# Patient Record
Sex: FEMALE | Race: WHITE | NOT HISPANIC OR LATINO | Employment: OTHER | URBAN - METROPOLITAN AREA
[De-identification: names, ages, dates, MRNs, and addresses within clinical notes are randomized per-mention and may not be internally consistent; named-entity substitution may affect disease eponyms.]

---

## 2017-02-23 DIAGNOSIS — Z74.09 IMPAIRED MOBILITY: Primary | ICD-10-CM

## 2017-03-08 ENCOUNTER — TELEPHONE (OUTPATIENT)
Dept: FAMILY MEDICINE | Facility: CLINIC | Age: 82
End: 2017-03-08

## 2017-03-08 DIAGNOSIS — Z12.31 SCREENING MAMMOGRAM FOR HIGH-RISK PATIENT: Primary | ICD-10-CM

## 2017-03-08 NOTE — TELEPHONE ENCOUNTER
----- Message from [place] sent at 3/8/2017  8:10 AM CST -----  Contact: self  Katarina Rivera  MRN: [id number]  : 1934  PCP: Ethan Denton  Home Phone      [phone]  Work Phone      Not on file.  Mobile          Not on file.      MESSAGE: call to set up mammogram, pt will be out of house today but back home tomorrow morning. Please call her in the morning.    Phone: [phone]

## 2017-03-14 ENCOUNTER — HOSPITAL ENCOUNTER (OUTPATIENT)
Dept: RADIOLOGY | Facility: HOSPITAL | Age: 82
Discharge: HOME | End: 2017-03-14
Attending: FAMILY MEDICINE
Payer: MEDICARE

## 2017-03-14 DIAGNOSIS — Z12.31 SCREENING MAMMOGRAM FOR HIGH-RISK PATIENT: ICD-10-CM

## 2017-03-14 PROCEDURE — 77067 SCR MAMMO BI INCL CAD: CPT | Mod: 26,,, | Performed by: RADIOLOGY

## 2017-03-14 PROCEDURE — 77063 BREAST TOMOSYNTHESIS BI: CPT | Mod: 26,,, | Performed by: RADIOLOGY

## 2017-03-14 PROCEDURE — 77067 SCR MAMMO BI INCL CAD: CPT | Mod: TC

## 2017-07-24 ENCOUNTER — OFFICE VISIT (OUTPATIENT)
Dept: NEUROLOGY | Facility: CLINIC | Age: 82
End: 2017-07-24
Payer: MEDICARE

## 2017-07-24 VITALS
SYSTOLIC BLOOD PRESSURE: 116 MMHG | HEART RATE: 66 BPM | DIASTOLIC BLOOD PRESSURE: 70 MMHG | RESPIRATION RATE: 16 BRPM | BODY MASS INDEX: 22.54 KG/M2 | WEIGHT: 127.19 LBS | HEIGHT: 63 IN

## 2017-07-24 DIAGNOSIS — G25.0 BENIGN ESSENTIAL TREMOR: Primary | ICD-10-CM

## 2017-07-24 PROCEDURE — 99213 OFFICE O/P EST LOW 20 MIN: CPT | Mod: PBBFAC | Performed by: PSYCHIATRY & NEUROLOGY

## 2017-07-24 PROCEDURE — 99213 OFFICE O/P EST LOW 20 MIN: CPT | Mod: S$PBB | Performed by: PSYCHIATRY & NEUROLOGY

## 2017-07-24 PROCEDURE — 99999 PR PBB SHADOW E&M-EST. PATIENT-LVL III: CPT | Mod: PBBFAC,,, | Performed by: PSYCHIATRY & NEUROLOGY

## 2017-07-24 RX ORDER — ATORVASTATIN CALCIUM 20 MG/1
20 TABLET, FILM COATED ORAL DAILY
COMMUNITY

## 2017-07-24 RX ORDER — METOPROLOL TARTRATE 100 MG/1
100 TABLET ORAL 2 TIMES DAILY
Status: ON HOLD | COMMUNITY
End: 2019-01-28 | Stop reason: SDUPTHER

## 2017-07-24 NOTE — LETTER
July 24, 2017      Matheus Alex MD  [address] [place] [address]           [facility]  [address] [facility] [address]  Phone: [phone]  Fax: [phone]          Patient: Katarina Rivera   MR Number: [id number]   YOB: 1934   Date of Visit: 7/24/2017       Dear Dr. Matheus Alex:    Thank you for referring Katarina Rivera to me for evaluation. Attached you will find relevant portions of my assessment and plan of care.    If you have questions, please do not hesitate to call me. I look forward to following Katarina Rivera along with you.    Sincerely,    Jr Poole MD    Enclosure  CC:  No Recipients    If you would like to receive this communication electronically, please contact [email or web address] or [phone] to request more information on [employer] access.    For providers and/or their staff who would like to refer a patient to [facility], please contact us through our one-stop-shop provider referral line, [facility], at [phone].    If you feel you have received this communication in error or would no longer like to receive these types of communications, please e-mail [email or web address]

## 2017-07-24 NOTE — PROGRESS NOTES
Consult from Dr Alex    HPI: Katarina Rivera is a 83 y.o. female with Tremor: She complains of tremor. There is a strong family history of tremor in all of her sisters and her mother. Tremor primarily involves the head.  Onset of symptoms was at least 30 years ago. Symptoms are currently of intermittent severity- sometimes mild or sometimes not often. She also has tremor of voice but states she damaged her vocal cords many years with screaming. Tremor exacerbated by hunger. Tremor is alleviated by sleep. She does not have any neck pain or involuntary spasms of the neck.   She denies drooling during sleep (wet pillows), rigidity, postural changes and difficulty with walking.  Her handwriting is normal.  She feels a bit dizzy in the am but steadies herself well- She was debilitated last year due to hospital stay which was related to her general medical problems. Was d/c'ed from NH last year.   She has no syncope  She is on NOAC for stroke prevention given her afib  She is on metoprolol   Patient lives alone but is here with family today. She was debilitated last year due to hospital stay which was related to her general medical problems. Was d/c'ed from NH last year.     Review of Systems   Constitutional: Negative for fever.   Eyes: Negative for double vision.   Respiratory: Negative for hemoptysis.    Cardiovascular: Negative for leg swelling.   Gastrointestinal: Negative for blood in stool.   Genitourinary: Negative for hematuria.   Musculoskeletal: Negative for falls and neck pain.   Skin: Negative for rash.   Neurological: Positive for tremors. Negative for headaches.   Psychiatric/Behavioral: Negative for memory loss.         I have reviewed all of this patient's past medical and surgical histories as well as family and social histories and active allergies and medications as documented in the electronic medical record.      Exam:  Gen Appearance, well developed/nourished in no apparent distress  CV: 2+ distal  pulses with no edema or swelling  Neuro:  MS: Awake, alert, oriented to place, person, time, situation. Sustains attention. Recent/remote memory intact, Language is full to spontaneous speech/repetition/naming/comprehension. Fund of Knowledge is full  She has a slight tremor of voice.   CN: Optic discs are flat with normal vasculature, PERRL, Extraoccular movements and visual fields are full. Normal facial sensation and strength, Hearing symmetric, Tongue and Palate are midline and strong. Shoulder Shrug symmetric and strong. There is a slight head tilt to the left and mild palpable spasms in the C spine  With the head tilt, it looks like she has cosmetic appearing sag of the left face which she states is chronic for years. There is no NLF weakness on strength testing- she will notify me if any changes in this/ if worse or new symptoms, but insists this is chronic  Motor: Normal bulk, tone, no abnormal movements of the hands, there is a no-no tremor of the head. 5/5 strength bilateral upper/lower extremities with 2+ reflexes and no clonus. NO protonotor  Sensory: symmetric to light touch, pain, temp, and vibration/proprioception. Romberg negative  Cerebellar: Finger-nose,Heal-shin, Rapid alternating movements intact  Gait: Normal stance, no ataxia      Assessment/Plan: Katarina Rivera is a 83 y.o. 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 The patient is not bothered by this long standing tremor and does not desire treatment  2. She is on metoprolol for rate and this may allow her some benefit from tremor  3. Botox would be another option for head tremor if desired. Note: she is on NOAC for stroke prevention given her afib.  Patient will see me PRN / as desired for tremor treatment.   CC: Dr Alex

## 2017-08-28 ENCOUNTER — PATIENT MESSAGE (OUTPATIENT)
Dept: FAMILY MEDICINE | Facility: CLINIC | Age: 82
End: 2017-08-28

## 2017-08-28 RX ORDER — SULFASALAZINE 500 MG/1
500 TABLET ORAL 2 TIMES DAILY
Qty: 60 TABLET | Refills: 0 | Status: SHIPPED | OUTPATIENT
Start: 2017-08-28 | End: 2017-10-26 | Stop reason: SDUPTHER

## 2017-10-26 ENCOUNTER — OFFICE VISIT (OUTPATIENT)
Dept: FAMILY MEDICINE | Facility: CLINIC | Age: 82
End: 2017-10-26
Payer: MEDICARE

## 2017-10-26 VITALS
BODY MASS INDEX: 24.14 KG/M2 | WEIGHT: 136.25 LBS | HEIGHT: 63 IN | SYSTOLIC BLOOD PRESSURE: 114 MMHG | DIASTOLIC BLOOD PRESSURE: 68 MMHG | HEART RATE: 66 BPM | RESPIRATION RATE: 16 BRPM

## 2017-10-26 DIAGNOSIS — M15.9 PRIMARY OSTEOARTHRITIS INVOLVING MULTIPLE JOINTS: ICD-10-CM

## 2017-10-26 DIAGNOSIS — I10 ESSENTIAL HYPERTENSION: ICD-10-CM

## 2017-10-26 DIAGNOSIS — E55.9 VITAMIN D INSUFFICIENCY: ICD-10-CM

## 2017-10-26 DIAGNOSIS — K51.90 CHRONIC ULCERATIVE COLITIS WITHOUT COMPLICATION, UNSPECIFIED LOCATION: Primary | ICD-10-CM

## 2017-10-26 DIAGNOSIS — R25.1 TREMOR: ICD-10-CM

## 2017-10-26 DIAGNOSIS — Z23 NEED FOR IMMUNIZATION AGAINST INFLUENZA: ICD-10-CM

## 2017-10-26 LAB
25(OH)D3+25(OH)D2 SERPL-MCNC: 25 NG/ML
ALBUMIN SERPL BCP-MCNC: 3.6 G/DL
ALP SERPL-CCNC: 119 U/L
ALT SERPL W/O P-5'-P-CCNC: 15 U/L
ANION GAP SERPL CALC-SCNC: 9 MMOL/L
AST SERPL-CCNC: 22 U/L
BASOPHILS # BLD AUTO: 0.07 K/UL
BASOPHILS NFR BLD: 0.5 %
BILIRUB SERPL-MCNC: 0.2 MG/DL
BUN SERPL-MCNC: 25 MG/DL
CALCIUM SERPL-MCNC: 9.6 MG/DL
CHLORIDE SERPL-SCNC: 108 MMOL/L
CO2 SERPL-SCNC: 27 MMOL/L
CREAT SERPL-MCNC: 1 MG/DL
DIFFERENTIAL METHOD: ABNORMAL
EOSINOPHIL # BLD AUTO: 0.8 K/UL
EOSINOPHIL NFR BLD: 5.5 %
ERYTHROCYTE [DISTWIDTH] IN BLOOD BY AUTOMATED COUNT: 15.4 %
ERYTHROCYTE [SEDIMENTATION RATE] IN BLOOD BY WESTERGREN METHOD: 9 MM/HR
EST. GFR  (AFRICAN AMERICAN): >60 ML/MIN/1.73 M^2
EST. GFR  (NON AFRICAN AMERICAN): 52 ML/MIN/1.73 M^2
GLUCOSE SERPL-MCNC: 85 MG/DL
HCT VFR BLD AUTO: 36.9 %
HGB BLD-MCNC: 11.2 G/DL
LYMPHOCYTES # BLD AUTO: 3.6 K/UL
LYMPHOCYTES NFR BLD: 26.5 %
MCH RBC QN AUTO: 26.6 PG
MCHC RBC AUTO-ENTMCNC: 30.4 G/DL
MCV RBC AUTO: 88 FL
MONOCYTES # BLD AUTO: 1.9 K/UL
MONOCYTES NFR BLD: 13.9 %
NEUTROPHILS # BLD AUTO: 7.3 K/UL
NEUTROPHILS NFR BLD: 53.6 %
PLATELET # BLD AUTO: 372 K/UL
PMV BLD AUTO: 11.6 FL
POTASSIUM SERPL-SCNC: 4.3 MMOL/L
PROT SERPL-MCNC: 7.4 G/DL
RBC # BLD AUTO: 4.21 M/UL
SODIUM SERPL-SCNC: 144 MMOL/L
WBC # BLD AUTO: 13.55 K/UL

## 2017-10-26 PROCEDURE — 85025 COMPLETE CBC W/AUTO DIFF WBC: CPT

## 2017-10-26 PROCEDURE — 99999 PR PBB SHADOW E&M-EST. PATIENT-LVL III: CPT | Mod: PBBFAC,,, | Performed by: FAMILY MEDICINE

## 2017-10-26 PROCEDURE — 85651 RBC SED RATE NONAUTOMATED: CPT

## 2017-10-26 PROCEDURE — 99214 OFFICE O/P EST MOD 30 MIN: CPT | Mod: S$PBB | Performed by: FAMILY MEDICINE

## 2017-10-26 PROCEDURE — 82306 VITAMIN D 25 HYDROXY: CPT

## 2017-10-26 PROCEDURE — G0008 ADMIN INFLUENZA VIRUS VAC: HCPCS | Mod: PBBFAC

## 2017-10-26 PROCEDURE — 36415 COLL VENOUS BLD VENIPUNCTURE: CPT | Mod: PBBFAC

## 2017-10-26 PROCEDURE — 80053 COMPREHEN METABOLIC PANEL: CPT

## 2017-10-26 PROCEDURE — 99213 OFFICE O/P EST LOW 20 MIN: CPT | Mod: PBBFAC | Performed by: FAMILY MEDICINE

## 2017-10-26 PROCEDURE — 99999 PR STA SHADOW: CPT | Mod: PBBFAC,,, | Performed by: FAMILY MEDICINE

## 2017-10-26 PROCEDURE — 99999 FLU VACCINE - HIGH DOSE (65+) PRESERVATIVE FREE IM: CPT | Mod: PBBFAC,,,

## 2017-10-26 RX ORDER — SULFASALAZINE 500 MG/1
500 TABLET ORAL 2 TIMES DAILY
Qty: 60 TABLET | Refills: 5 | Status: SHIPPED | OUTPATIENT
Start: 2017-10-26 | End: 2019-01-29 | Stop reason: SDUPTHER

## 2017-10-26 RX ORDER — FUROSEMIDE 40 MG/1
40 TABLET ORAL DAILY
Qty: 30 TABLET | Refills: 11 | Status: SHIPPED | OUTPATIENT
Start: 2017-10-26 | End: 2019-01-29 | Stop reason: SDUPTHER

## 2017-10-26 NOTE — PROGRESS NOTES
Subjective:       Patient ID: Katarina Rivera is a 83 y.o. female.    Chief Complaint: Medication Refill    Pt is a 83 y.o. female who presents for check up for Chronic ulcerative colitis without complication, unspecified location  (primary encounter diagnosis)  Tremor  Primary osteoarthritis involving multiple joints. Doing well on current meds. Denies any side effects. Prevention is not up to date.      Medication Refill   Pertinent negatives include no abdominal pain, chest pain, chills, coughing, fever, headaches, joint swelling, nausea, numbness, rash, sore throat, vomiting or weakness.     Review of Systems   Constitutional: Negative for appetite change, chills and fever.   HENT: Negative for rhinorrhea, sinus pressure, sore throat and trouble swallowing.    Respiratory: Negative for cough, chest tightness, shortness of breath and wheezing.    Cardiovascular: Negative for chest pain and palpitations.   Gastrointestinal: Negative for abdominal pain, blood in stool, diarrhea, nausea and vomiting.   Genitourinary: Negative for dysuria, flank pain, hematuria, pelvic pain, urgency, vaginal bleeding, vaginal discharge and vaginal pain.   Musculoskeletal: Negative for back pain, joint swelling and neck stiffness.   Skin: Negative for rash.   Neurological: Positive for tremors. Negative for dizziness, weakness, light-headedness, numbness and headaches.   Hematological: Does not bruise/bleed easily.   Psychiatric/Behavioral: Negative for agitation. The patient is not nervous/anxious.        Objective:      Physical Exam   Constitutional: She is oriented to person, place, and time. She appears well-developed and well-nourished.   HENT:   Head: Normocephalic.   Speech is weak   Eyes: Pupils are equal, round, and reactive to light.   Neck: Normal range of motion. Neck supple. No thyromegaly present.   Cardiovascular: Normal rate and regular rhythm.    Pulmonary/Chest: No respiratory distress. She has no wheezes. She has no  rales. She exhibits no tenderness.   Abdominal: She exhibits no distension. There is no tenderness. There is no rebound and no guarding.   Musculoskeletal: Normal range of motion. She exhibits no edema or tenderness.   Lymphadenopathy:     She has no cervical adenopathy.   Neurological: She is alert and oriented to person, place, and time. She has normal reflexes. She displays normal reflexes. No cranial nerve deficit. She exhibits normal muscle tone. Coordination normal.   Tremors of her head   Skin: Skin is warm and dry. No rash noted. No pallor.   Psychiatric: She has a normal mood and affect. Judgment and thought content normal.       Assessment:       1. Chronic ulcerative colitis without complication, unspecified location    2. Tremor    3. Primary osteoarthritis involving multiple joints    4. Essential hypertension    5. Vitamin D insufficiency    6. Need for immunization against influenza        Plan:   Katarina was seen today for medication refill.    Diagnoses and all orders for this visit:    Chronic ulcerative colitis without complication, unspecified location  -     Comprehensive metabolic panel; Future  -     CBC auto differential; Future  -     Sedimentation rate, manual; Future    Tremor    Primary osteoarthritis involving multiple joints    Essential hypertension  -     furosemide (LASIX) 40 MG tablet; Take 1 tablet (40 mg total) by mouth once daily.    Vitamin D insufficiency  -     Vitamin D; Future    Need for immunization against influenza  -     Influenza - High Dose (65+) (PF) (IM)    Other orders  -     sulfaSALAzine (AZULFIDINE) 500 mg Tab; Take 1 tablet (500 mg total) by mouth 2 (two) times daily.

## 2017-11-16 ENCOUNTER — HOSPITAL ENCOUNTER (EMERGENCY)
Facility: HOSPITAL | Age: 82
Discharge: HOME | End: 2017-11-16
Attending: FAMILY MEDICINE
Payer: MEDICARE

## 2017-11-16 VITALS
DIASTOLIC BLOOD PRESSURE: 85 MMHG | OXYGEN SATURATION: 97 % | SYSTOLIC BLOOD PRESSURE: 187 MMHG | WEIGHT: 130 LBS | BODY MASS INDEX: 23.03 KG/M2 | RESPIRATION RATE: 16 BRPM | TEMPERATURE: 97 F | HEART RATE: 59 BPM

## 2017-11-16 DIAGNOSIS — K08.89 TOOTHACHE: Primary | ICD-10-CM

## 2017-11-16 PROCEDURE — 25000003 PHARM REV CODE 250: Performed by: FAMILY MEDICINE

## 2017-11-16 PROCEDURE — 99283 EMERGENCY DEPT VISIT LOW MDM: CPT

## 2017-11-16 RX ORDER — KETOROLAC TROMETHAMINE 10 MG/1
10 TABLET, FILM COATED ORAL
Status: COMPLETED | OUTPATIENT
Start: 2017-11-16 | End: 2017-11-16

## 2017-11-16 RX ORDER — PENICILLIN V POTASSIUM 500 MG/1
500 TABLET, FILM COATED ORAL 4 TIMES DAILY
Qty: 40 TABLET | Refills: 0 | Status: SHIPPED | OUTPATIENT
Start: 2017-11-16 | End: 2017-11-23

## 2017-11-16 RX ORDER — PENICILLIN V POTASSIUM 250 MG/1
500 TABLET, FILM COATED ORAL
Status: COMPLETED | OUTPATIENT
Start: 2017-11-16 | End: 2017-11-16

## 2017-11-16 RX ORDER — KETOROLAC TROMETHAMINE 10 MG/1
10 TABLET, FILM COATED ORAL 4 TIMES DAILY PRN
Qty: 10 TABLET | Refills: 0 | Status: SHIPPED | OUTPATIENT
Start: 2017-11-16 | End: 2019-01-25

## 2017-11-16 RX ADMIN — KETOROLAC TROMETHAMINE 10 MG: 10 TABLET, FILM COATED ORAL at 03:11

## 2017-11-16 RX ADMIN — PENICILLIN V POTASSIUM 500 MG: 250 TABLET, FILM COATED ORAL at 03:11

## 2017-11-16 NOTE — ED TRIAGE NOTES
83 y.o. female presents to ER ED 04/ED 04   Chief Complaint   Patient presents with    Dental Pain     right side for 2 days   . Reports taking several extra-strength Tylenol with no relief.

## 2017-11-16 NOTE — ED PROVIDER NOTES
Encounter Date: 11/16/2017       History     Chief Complaint   Patient presents with    Dental Pain     right side for 2 days     The history is provided by the patient. No  was used.   Dental Pain   The primary symptoms include mouth pain. Primary symptoms do not include dental injury, oral bleeding, oral lesions, headaches, fever, sore throat or angioedema. The symptoms began two days ago. The symptoms are unchanged. The symptoms are new. The symptoms occur constantly.   Mouth pain began 24 -48 hours ago. Mouth pain occurs constantly. Affected locations include: teeth. At its highest the mouth pain was at 5/10. The mouth pain is currently at 5/10.   Additional symptoms include: dental sensitivity to temperature and jaw pain. Additional symptoms do not include: gum swelling, gum tenderness, purulent gums, trismus, facial swelling, trouble swallowing, pain with swallowing, excessive salivation, dry mouth, taste disturbance, smell disturbance, drooling, ear pain, hearing loss, nosebleeds, swollen glands, goiter and fatigue. Medical issues do not include: smoking.     Patient reports onset of pain on the right side of her mouth starting 2 days ago.  Pain is worse with doing any activity hot cold.  She took Tylenol.  No fever chills nausea or vomiting.  She does not smoke cigarettes.  She thinks she may have broken a tooth.    Review of patient's allergies indicates:  No Known Allergies  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      Past Surgical History:   Procedure Laterality Date    APPENDECTOMY      COLONOSCOPY      COLONOSCOPY N/A 11/10/2015    Procedure: COLONOSCOPY;  Surgeon: Michael San MD;  Location: [address];  Service: Endoscopy;  Laterality: N/A;    hemorrhoidectomy      HYSTERECTOMY      TOTAL KNEE ARTHROPLASTY      left      Family History   Problem Relation Age of  Onset    Stroke Mother     Blindness Father     Colon cancer Neg Hx     Ulcerative colitis Neg Hx      Social History   Substance Use Topics    Smoking status: Never Smoker    Smokeless tobacco: Never Used    Alcohol use No     Review of Systems   Constitutional: Negative for fatigue and fever.   HENT: Negative for drooling, ear pain, facial swelling, hearing loss, nosebleeds, sore throat and trouble swallowing.    Neurological: Negative for headaches.       Physical Exam     Initial Vitals [11/16/17 0342]   BP Pulse Resp Temp SpO2   (!) 187/85 (!) 59 16 96.7 °F (35.9 °C) 97 %      MAP       119         Physical Exam    Nursing note and vitals reviewed.  Constitutional:   Elderly female with persistent head tremor.   HENT:   Head: Normocephalic and atraumatic.       Right Ear: External ear normal.   Left Ear: External ear normal.   Nose: Nose normal.   Mouth/Throat: Oropharynx is clear and moist. She does not have dentures. No oral lesions. No trismus in the jaw. Abnormal dentition. Dental caries present. No dental abscesses, uvula swelling or lacerations.   Eyes: Conjunctivae and EOM are normal. Pupils are equal, round, and reactive to light.   Neck: Normal range of motion.   Cardiovascular: Normal rate, regular rhythm and normal heart sounds.   Pulmonary/Chest: Breath sounds normal.   Psychiatric: She has a normal mood and affect.         ED Course   Procedures  Labs Reviewed - No data to display                            ED Course      Clinical Impression:   The encounter diagnosis was Toothache.                           Bucky Nicole MD  11/16/17 0355

## 2018-03-22 ENCOUNTER — HOSPITAL ENCOUNTER (OUTPATIENT)
Dept: RADIOLOGY | Facility: HOSPITAL | Age: 83
Discharge: HOME | End: 2018-03-22
Attending: FAMILY MEDICINE
Payer: MEDICARE

## 2018-03-22 VITALS — BODY MASS INDEX: 23.04 KG/M2 | WEIGHT: 130 LBS | HEIGHT: 63 IN

## 2018-03-22 DIAGNOSIS — Z12.31 ENCOUNTER FOR SCREENING MAMMOGRAM FOR BREAST CANCER: ICD-10-CM

## 2018-03-22 PROCEDURE — 77063 BREAST TOMOSYNTHESIS BI: CPT | Mod: 26,,, | Performed by: RADIOLOGY

## 2018-03-22 PROCEDURE — 77067 SCR MAMMO BI INCL CAD: CPT | Mod: 26,,, | Performed by: RADIOLOGY

## 2018-03-22 PROCEDURE — 77067 SCR MAMMO BI INCL CAD: CPT | Mod: TC

## 2019-01-25 ENCOUNTER — HOSPITAL ENCOUNTER (INPATIENT)
Facility: HOSPITAL | Age: 84
LOS: 3 days | Discharge: HOME | DRG: 176 | End: 2019-01-28
Attending: FAMILY MEDICINE | Admitting: FAMILY MEDICINE
Payer: MEDICARE

## 2019-01-25 ENCOUNTER — HOSPITAL ENCOUNTER (EMERGENCY)
Facility: HOSPITAL | Age: 84
Discharge: ANOTHER HOSPITAL | End: 2019-01-25
Attending: SURGERY
Payer: MEDICARE

## 2019-01-25 ENCOUNTER — TELEPHONE (OUTPATIENT)
Dept: CARDIOLOGY | Facility: CLINIC | Age: 84
End: 2019-01-25

## 2019-01-25 VITALS
BODY MASS INDEX: 24.8 KG/M2 | DIASTOLIC BLOOD PRESSURE: 70 MMHG | OXYGEN SATURATION: 92 % | HEIGHT: 63 IN | RESPIRATION RATE: 22 BRPM | SYSTOLIC BLOOD PRESSURE: 130 MMHG | HEART RATE: 108 BPM | WEIGHT: 140 LBS | TEMPERATURE: 98 F

## 2019-01-25 DIAGNOSIS — R06.00 DYSPNEA: Primary | ICD-10-CM

## 2019-01-25 DIAGNOSIS — R07.9 CHEST PAIN: ICD-10-CM

## 2019-01-25 DIAGNOSIS — I21.4 NSTEMI (NON-ST ELEVATED MYOCARDIAL INFARCTION): ICD-10-CM

## 2019-01-25 DIAGNOSIS — I26.99 BILATERAL PULMONARY EMBOLISM: Primary | ICD-10-CM

## 2019-01-25 DIAGNOSIS — I51.89 DIASTOLIC DYSFUNCTION: ICD-10-CM

## 2019-01-25 DIAGNOSIS — D64.9 SYMPTOMATIC ANEMIA: ICD-10-CM

## 2019-01-25 DIAGNOSIS — Z87.19 HISTORY OF ULCERATIVE COLITIS: ICD-10-CM

## 2019-01-25 DIAGNOSIS — I50.9 HEART FAILURE: ICD-10-CM

## 2019-01-25 DIAGNOSIS — K51.919 ULCERATIVE COLITIS WITH COMPLICATION, UNSPECIFIED LOCATION: ICD-10-CM

## 2019-01-25 LAB
ABO + RH BLD: NORMAL
ACANTHOCYTES BLD QL SMEAR: PRESENT
ALBUMIN SERPL BCP-MCNC: 3.5 G/DL
ALLENS TEST: ABNORMAL
ALP SERPL-CCNC: 106 U/L
ALT SERPL W/O P-5'-P-CCNC: 8 U/L
ANION GAP SERPL CALC-SCNC: 11 MMOL/L
ANISOCYTOSIS BLD QL SMEAR: ABNORMAL
ANISOCYTOSIS BLD QL SMEAR: ABNORMAL
ANISOCYTOSIS BLD QL SMEAR: SLIGHT
APTT BLDCRRT: 28 SEC
APTT BLDCRRT: 29.5 SEC
APTT BLDCRRT: 30 SEC
APTT BLDCRRT: 52.4 SEC
AST SERPL-CCNC: 10 U/L
BASOPHILS # BLD AUTO: 0.04 K/UL
BASOPHILS # BLD AUTO: ABNORMAL K/UL
BASOPHILS # BLD AUTO: ABNORMAL K/UL
BASOPHILS NFR BLD: 0 %
BASOPHILS NFR BLD: 0.3 %
BASOPHILS NFR BLD: 1 %
BILIRUB SERPL-MCNC: 0.6 MG/DL
BLD GP AB SCN CELLS X3 SERPL QL: NORMAL
BLD PROD TYP BPU: NORMAL
BLD PROD TYP BPU: NORMAL
BLOOD UNIT EXPIRATION DATE: NORMAL
BLOOD UNIT EXPIRATION DATE: NORMAL
BLOOD UNIT TYPE CODE: 5100
BLOOD UNIT TYPE CODE: 5100
BLOOD UNIT TYPE: NORMAL
BLOOD UNIT TYPE: NORMAL
BNP SERPL-MCNC: 515 PG/ML
BUN SERPL-MCNC: 20 MG/DL
CALCIUM SERPL-MCNC: 9.1 MG/DL
CHLORIDE SERPL-SCNC: 109 MMOL/L
CK MB SERPL-MCNC: 2.2 NG/ML
CK MB SERPL-RTO: 5.6 %
CK SERPL-CCNC: 39 U/L
CK SERPL-CCNC: 39 U/L
CO2 SERPL-SCNC: 21 MMOL/L
CODING SYSTEM: NORMAL
CODING SYSTEM: NORMAL
CREAT SERPL-MCNC: 1.1 MG/DL
DELSYS: ABNORMAL
DIFFERENTIAL METHOD: ABNORMAL
DISPENSE STATUS: NORMAL
DISPENSE STATUS: NORMAL
EOSINOPHIL # BLD AUTO: 0 K/UL
EOSINOPHIL # BLD AUTO: ABNORMAL K/UL
EOSINOPHIL # BLD AUTO: ABNORMAL K/UL
EOSINOPHIL NFR BLD: 0 %
EOSINOPHIL NFR BLD: 0 %
EOSINOPHIL NFR BLD: 0.2 %
ERYTHROCYTE [DISTWIDTH] IN BLOOD BY AUTOMATED COUNT: 19.2 %
ERYTHROCYTE [DISTWIDTH] IN BLOOD BY AUTOMATED COUNT: 27.5 %
ERYTHROCYTE [DISTWIDTH] IN BLOOD BY AUTOMATED COUNT: 28 %
EST. GFR  (AFRICAN AMERICAN): 53 ML/MIN/1.73 M^2
EST. GFR  (NON AFRICAN AMERICAN): 46 ML/MIN/1.73 M^2
FERRITIN SERPL-MCNC: 13 NG/ML
FOLATE SERPL-MCNC: 3.8 NG/ML
GLUCOSE SERPL-MCNC: 162 MG/DL
HCO3 UR-SCNC: 19.6 MMOL/L (ref 22–26)
HCT VFR BLD AUTO: 27.7 %
HCT VFR BLD AUTO: 33.7 %
HCT VFR BLD AUTO: 34.9 %
HGB BLD-MCNC: 10.3 G/DL
HGB BLD-MCNC: 10.4 G/DL
HGB BLD-MCNC: 7.9 G/DL
HYPOCHROMIA BLD QL SMEAR: ABNORMAL
INR PPP: 1
IRON SERPL-MCNC: <10 UG/DL
LYMPHOCYTES # BLD AUTO: 1.4 K/UL
LYMPHOCYTES # BLD AUTO: ABNORMAL K/UL
LYMPHOCYTES # BLD AUTO: ABNORMAL K/UL
LYMPHOCYTES NFR BLD: 1 %
LYMPHOCYTES NFR BLD: 6 %
LYMPHOCYTES NFR BLD: 9.2 %
MCH RBC QN AUTO: 18.2 PG
MCH RBC QN AUTO: 21.4 PG
MCH RBC QN AUTO: 22 PG
MCHC RBC AUTO-ENTMCNC: 28.5 G/DL
MCHC RBC AUTO-ENTMCNC: 29.8 G/DL
MCHC RBC AUTO-ENTMCNC: 30.6 G/DL
MCV RBC AUTO: 64 FL
MCV RBC AUTO: 72 FL
MCV RBC AUTO: 72 FL
MONOCYTES # BLD AUTO: 1.5 K/UL
MONOCYTES # BLD AUTO: ABNORMAL K/UL
MONOCYTES # BLD AUTO: ABNORMAL K/UL
MONOCYTES NFR BLD: 2 %
MONOCYTES NFR BLD: 7 %
MONOCYTES NFR BLD: 9.6 %
NEUTROPHILS # BLD AUTO: 12.4 K/UL
NEUTROPHILS NFR BLD: 80.7 %
NEUTROPHILS NFR BLD: 82 %
NEUTROPHILS NFR BLD: 96 %
NEUTS BAND NFR BLD MANUAL: 5 %
OVALOCYTES BLD QL SMEAR: ABNORMAL
OVALOCYTES BLD QL SMEAR: ABNORMAL
PCO2 BLDA: 31 MMHG (ref 35–45)
PH SMN: 7.41 [PH] (ref 7.35–7.45)
PLATELET # BLD AUTO: 341 K/UL
PLATELET # BLD AUTO: 375 K/UL
PLATELET # BLD AUTO: 412 K/UL
PLATELET BLD QL SMEAR: ABNORMAL
PLATELET BLD QL SMEAR: ABNORMAL
PMV BLD AUTO: 10.2 FL
PMV BLD AUTO: 10.3 FL
PMV BLD AUTO: 9.7 FL
PO2 BLDA: 54 MMHG (ref 75–100)
POC BE: -4.5 MMOL/L (ref -2–2)
POC COHB: 2.7 % (ref 0–3)
POC METHB: 1.7 % (ref 0–1.5)
POC O2HB ARTERIAL: 86.6 % (ref 94–100)
POC SATURATED O2: 90.5 % (ref 90–100)
POC TCO2: 20.6 MMOL/L
POC THB: 7.3 G/DL (ref 12–18)
POIKILOCYTOSIS BLD QL SMEAR: SLIGHT
POLYCHROMASIA BLD QL SMEAR: ABNORMAL
POTASSIUM SERPL-SCNC: 3.9 MMOL/L
PROT SERPL-MCNC: 7.4 G/DL
PROTHROMBIN TIME: 10.6 SEC
PROTHROMBIN TIME: 10.9 SEC
PROTHROMBIN TIME: 11 SEC
RBC # BLD AUTO: 4.33 M/UL
RBC # BLD AUTO: 4.69 M/UL
RBC # BLD AUTO: 4.87 M/UL
SATURATED IRON: ABNORMAL %
SITE: ABNORMAL
SODIUM SERPL-SCNC: 141 MMOL/L
TOTAL IRON BINDING CAPACITY: 514 UG/DL
TRANS ERYTHROCYTES VOL PATIENT: NORMAL ML
TRANS ERYTHROCYTES VOL PATIENT: NORMAL ML
TRANSFERRIN SERPL-MCNC: 347 MG/DL
TROPONIN I SERPL DL<=0.01 NG/ML-MCNC: 0.17 NG/ML
TROPONIN I SERPL DL<=0.01 NG/ML-MCNC: 0.2 NG/ML
VIT B12 SERPL-MCNC: 452 PG/ML
WBC # BLD AUTO: 13.16 K/UL
WBC # BLD AUTO: 15.4 K/UL
WBC # BLD AUTO: 21.87 K/UL

## 2019-01-25 PROCEDURE — 96375 TX/PRO/DX INJ NEW DRUG ADDON: CPT | Mod: 59

## 2019-01-25 PROCEDURE — P9021 RED BLOOD CELLS UNIT: HCPCS

## 2019-01-25 PROCEDURE — C9113 INJ PANTOPRAZOLE SODIUM, VIA: HCPCS | Performed by: EMERGENCY MEDICINE

## 2019-01-25 PROCEDURE — 85730 THROMBOPLASTIN TIME PARTIAL: CPT | Mod: 91

## 2019-01-25 PROCEDURE — 25500020 PHARM REV CODE 255: Performed by: EMERGENCY MEDICINE

## 2019-01-25 PROCEDURE — 93010 EKG 12-LEAD: ICD-10-PCS | Mod: 59,,, | Performed by: INTERNAL MEDICINE

## 2019-01-25 PROCEDURE — 85610 PROTHROMBIN TIME: CPT

## 2019-01-25 PROCEDURE — 96361 HYDRATE IV INFUSION ADD-ON: CPT | Mod: 59

## 2019-01-25 PROCEDURE — 94761 N-INVAS EAR/PLS OXIMETRY MLT: CPT

## 2019-01-25 PROCEDURE — 82607 VITAMIN B-12: CPT

## 2019-01-25 PROCEDURE — 82746 ASSAY OF FOLIC ACID SERUM: CPT

## 2019-01-25 PROCEDURE — 85610 PROTHROMBIN TIME: CPT | Mod: 91

## 2019-01-25 PROCEDURE — 20000000 HC ICU ROOM

## 2019-01-25 PROCEDURE — 36415 COLL VENOUS BLD VENIPUNCTURE: CPT

## 2019-01-25 PROCEDURE — 86920 COMPATIBILITY TEST SPIN: CPT | Mod: 59

## 2019-01-25 PROCEDURE — 82803 BLOOD GASES ANY COMBINATION: CPT | Performed by: EMERGENCY MEDICINE

## 2019-01-25 PROCEDURE — 82728 ASSAY OF FERRITIN: CPT

## 2019-01-25 PROCEDURE — 25000003 PHARM REV CODE 250: Performed by: EMERGENCY MEDICINE

## 2019-01-25 PROCEDURE — 85025 COMPLETE CBC W/AUTO DIFF WBC: CPT

## 2019-01-25 PROCEDURE — 36600 WITHDRAWAL OF ARTERIAL BLOOD: CPT

## 2019-01-25 PROCEDURE — 85027 COMPLETE CBC AUTOMATED: CPT

## 2019-01-25 PROCEDURE — 93005 ELECTROCARDIOGRAM TRACING: CPT

## 2019-01-25 PROCEDURE — 84484 ASSAY OF TROPONIN QUANT: CPT | Mod: 91

## 2019-01-25 PROCEDURE — 63600175 PHARM REV CODE 636 W HCPCS: Performed by: EMERGENCY MEDICINE

## 2019-01-25 PROCEDURE — 85730 THROMBOPLASTIN TIME PARTIAL: CPT

## 2019-01-25 PROCEDURE — 96376 TX/PRO/DX INJ SAME DRUG ADON: CPT

## 2019-01-25 PROCEDURE — 36430 TRANSFUSION BLD/BLD COMPNT: CPT

## 2019-01-25 PROCEDURE — 80053 COMPREHEN METABOLIC PANEL: CPT

## 2019-01-25 PROCEDURE — 99285 EMERGENCY DEPT VISIT HI MDM: CPT | Mod: 25

## 2019-01-25 PROCEDURE — 83880 ASSAY OF NATRIURETIC PEPTIDE: CPT

## 2019-01-25 PROCEDURE — 93010 ELECTROCARDIOGRAM REPORT: CPT | Mod: 59,,, | Performed by: INTERNAL MEDICINE

## 2019-01-25 PROCEDURE — 82550 ASSAY OF CK (CPK): CPT

## 2019-01-25 PROCEDURE — 27000221 HC OXYGEN, UP TO 24 HOURS

## 2019-01-25 PROCEDURE — 83540 ASSAY OF IRON: CPT

## 2019-01-25 PROCEDURE — 82553 CREATINE MB FRACTION: CPT

## 2019-01-25 PROCEDURE — 84484 ASSAY OF TROPONIN QUANT: CPT

## 2019-01-25 PROCEDURE — 86901 BLOOD TYPING SEROLOGIC RH(D): CPT

## 2019-01-25 PROCEDURE — 25000003 PHARM REV CODE 250: Performed by: STUDENT IN AN ORGANIZED HEALTH CARE EDUCATION/TRAINING PROGRAM

## 2019-01-25 PROCEDURE — 96374 THER/PROPH/DIAG INJ IV PUSH: CPT | Mod: 59

## 2019-01-25 PROCEDURE — 85007 BL SMEAR W/DIFF WBC COUNT: CPT

## 2019-01-25 RX ORDER — FUROSEMIDE 40 MG/1
40 TABLET ORAL DAILY
Status: DISCONTINUED | OUTPATIENT
Start: 2019-01-26 | End: 2019-01-28 | Stop reason: HOSPADM

## 2019-01-25 RX ORDER — PANTOPRAZOLE SODIUM 40 MG/10ML
80 INJECTION, POWDER, LYOPHILIZED, FOR SOLUTION INTRAVENOUS
Status: COMPLETED | OUTPATIENT
Start: 2019-01-25 | End: 2019-01-25

## 2019-01-25 RX ORDER — LATANOPROST 50 UG/ML
1 SOLUTION/ DROPS OPHTHALMIC NIGHTLY
Status: DISCONTINUED | OUTPATIENT
Start: 2019-01-25 | End: 2019-01-28 | Stop reason: HOSPADM

## 2019-01-25 RX ORDER — HEPARIN SODIUM,PORCINE/D5W 25000/250
18 INTRAVENOUS SOLUTION INTRAVENOUS CONTINUOUS
Status: DISCONTINUED | OUTPATIENT
Start: 2019-01-25 | End: 2019-01-25 | Stop reason: HOSPADM

## 2019-01-25 RX ORDER — ONDANSETRON 4 MG/1
4 TABLET, ORALLY DISINTEGRATING ORAL
Status: COMPLETED | OUTPATIENT
Start: 2019-01-25 | End: 2019-01-25

## 2019-01-25 RX ORDER — HEPARIN SODIUM,PORCINE/D5W 25000/250
18 INTRAVENOUS SOLUTION INTRAVENOUS CONTINUOUS
Status: DISCONTINUED | OUTPATIENT
Start: 2019-01-25 | End: 2019-01-28 | Stop reason: HOSPADM

## 2019-01-25 RX ORDER — HYDROCODONE BITARTRATE AND ACETAMINOPHEN 500; 5 MG/1; MG/1
TABLET ORAL
Status: DISCONTINUED | OUTPATIENT
Start: 2019-01-25 | End: 2019-01-25 | Stop reason: HOSPADM

## 2019-01-25 RX ORDER — SODIUM CHLORIDE 0.9 % (FLUSH) 0.9 %
3 SYRINGE (ML) INJECTION
Status: DISCONTINUED | OUTPATIENT
Start: 2019-01-25 | End: 2019-01-28 | Stop reason: HOSPADM

## 2019-01-25 RX ORDER — MORPHINE SULFATE 2 MG/ML
2 INJECTION, SOLUTION INTRAMUSCULAR; INTRAVENOUS
Status: COMPLETED | OUTPATIENT
Start: 2019-01-25 | End: 2019-01-25

## 2019-01-25 RX ORDER — METOPROLOL TARTRATE 50 MG/1
100 TABLET ORAL 2 TIMES DAILY
Status: DISCONTINUED | OUTPATIENT
Start: 2019-01-25 | End: 2019-01-28 | Stop reason: HOSPADM

## 2019-01-25 RX ORDER — HEPARIN SODIUM 5000 [USP'U]/ML
5000 INJECTION, SOLUTION INTRAVENOUS; SUBCUTANEOUS
Status: DISCONTINUED | OUTPATIENT
Start: 2019-01-25 | End: 2019-01-25

## 2019-01-25 RX ORDER — ASPIRIN 325 MG
325 TABLET, DELAYED RELEASE (ENTERIC COATED) ORAL
Status: DISCONTINUED | OUTPATIENT
Start: 2019-01-25 | End: 2019-01-25

## 2019-01-25 RX ORDER — ATORVASTATIN CALCIUM 20 MG/1
20 TABLET, FILM COATED ORAL DAILY
Status: DISCONTINUED | OUTPATIENT
Start: 2019-01-26 | End: 2019-01-28 | Stop reason: HOSPADM

## 2019-01-25 RX ORDER — RAMELTEON 8 MG/1
8 TABLET ORAL NIGHTLY PRN
Status: DISCONTINUED | OUTPATIENT
Start: 2019-01-25 | End: 2019-01-28 | Stop reason: HOSPADM

## 2019-01-25 RX ADMIN — SODIUM CHLORIDE 1000 ML: 0.9 INJECTION, SOLUTION INTRAVENOUS at 09:01

## 2019-01-25 RX ADMIN — PANTOPRAZOLE SODIUM 80 MG: 40 INJECTION, POWDER, FOR SOLUTION INTRAVENOUS at 11:01

## 2019-01-25 RX ADMIN — NITROGLYCERIN 0.5 INCH: 20 OINTMENT TOPICAL at 07:01

## 2019-01-25 RX ADMIN — SODIUM CHLORIDE 500 ML: 0.9 INJECTION, SOLUTION INTRAVENOUS at 06:01

## 2019-01-25 RX ADMIN — METOPROLOL TARTRATE 100 MG: 50 TABLET ORAL at 10:01

## 2019-01-25 RX ADMIN — MORPHINE SULFATE 2 MG: 2 INJECTION, SOLUTION INTRAMUSCULAR; INTRAVENOUS at 08:01

## 2019-01-25 RX ADMIN — IOHEXOL 100 ML: 350 INJECTION, SOLUTION INTRAVENOUS at 10:01

## 2019-01-25 RX ADMIN — HEPARIN SODIUM 18 UNITS/KG/HR: 10000 INJECTION, SOLUTION INTRAVENOUS at 12:01

## 2019-01-25 RX ADMIN — LATANOPROST 1 DROP: 50 SOLUTION OPHTHALMIC at 10:01

## 2019-01-25 RX ADMIN — ONDANSETRON 4 MG: 4 TABLET, ORALLY DISINTEGRATING ORAL at 08:01

## 2019-01-25 NOTE — TELEPHONE ENCOUNTER
----- Message from Jaelyn Riley sent at 1/25/2019 12:48 PM CST -----  Yakelin House Supervisor for [facility], called to speak to Dr. Sinclair.   No. [phone]

## 2019-01-25 NOTE — ED NOTES
Spoke to Vandana with transfer center. Pt accepted at [facility] Room 254. Transfer center to set up transport via [facility]

## 2019-01-25 NOTE — ED PROVIDER NOTES
[facility]                                                  Chief Complaint  84 y.o. female with Shortness of Breath    History of Present Illness  Katarina Rivera presents to the emergency room with complaints of shortness of breath, generalized weakness, and pressure in her chest.  Patient's 84-year-old highly functioning female who typically walks, cooks, drives.  She lives with her daughter.  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  She reports that she does not have associated cough or congestion.  She does not feel sick other than noticing that she feels weaker and more tired than normal.      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      Past Surgical History:   Procedure Laterality Date    APPENDECTOMY      COLONOSCOPY      COLONOSCOPY N/A 11/10/2015    [facility]    COLONOSCOPY N/A 6/12/2014    [facility] at [facility] ([facility]TH [facility])    COLONOSCOPY N/A 3/14/2013    [facility]formed by [facility] at [facility] ([facility]TH [facility])    hemorrhoidectomy      HYSTERECTOMY      OOPHORECTOMY      TOTAL KNEE ARTHROPLASTY      left       Review of patient's allergies indicates:  No Known Allergies     Review of Systems and Physical Exam     Review of Systems  -- Constitution - no fever, denies fatigue, no weakness, no chills  -- Eyes - no tearing or redness, no visual disturbance  -- Ear, Nose - no tinnitus or earache, no nasal congestion or discharge  -- Mouth,Throat - no sore throat, no toothache, normal voice, normal swallowing  -- Respiratory - denies cough and congestion, reports shortness of breath, no wheezing  -- Cardiovascular - denies pain in her chest however she does report heaviness, no palpitations, denies claudication  -- Gastrointestinal - denies abdominal pain, nausea, vomiting, or  diarrhea  -- Genitourinary - no dysuria, no denies flank pain, no hematuria or frequency   -- Musculoskeletal - denies back pain, negative for myalgias and arthralgias   -- Neurological - no headache, no loss of consciousness reports weakness  -- Skin - denies pallor, rash, or changes in skin. no hives or welts noted    Vital Signs   oral temperature is 97.2 °F (36.2 °C). Her blood pressure is 152/71 (abnormal) and her pulse is 111 (abnormal). Her respiration is 18 and oxygen saturation is 97%.      Physical Exam  -- Nursing note and vitals reviewed, awake alert oriented with a GCS of 15, tachycardia 111  -- Constitutional: Appears well-developed and well-nourished  -- Head: Atraumatic. Normocephalic. No obvious abnormality  -- Eyes: Pupils are equal and reactive to light. Normal conjunctiva and lids  -- Nose: Nose normal in appearance, nares grossly normal. No discharge  -- Throat: Mucous membranes dry, pharynx normal, normal tonsils. No lesions   -- Ears: External ears and TM normal bilaterally. Normal hearing and no drainage  -- Neck: Normal range of motion. Neck supple. No masses, trachea midline  -- Cardiac: Normal rate, regular rhythm and normal heart sounds  -- Pulmonary: Normal respiratory effort, breath sounds clear to auscultation  -- Abdominal: Soft, no tenderness. Normal bowel sounds. Normal liver edge  -- Musculoskeletal: Normal range of motion, no effusions. Joints stable. All 4 extremities with 5/5 strength, neurovascularly intact  -- Neurological:  Cranial nerves 2-12 grossly intact No focal deficits. Showed good interaction with staff  -- Vascular: Posterior tibial, dorsalis pedis and radial pulses 2+ bilaterally    -- Lymphatics: No cervical or peripheral lymphadenopathy. No edema noted  -- Skin: Warm and dry. No evidence of rash or cellulitis  -- Psychiatric: Normal mood and affect. Bedside behavior is appropriate    Emergency Room Course     Treatment and Evaluation  1.  Physical exam significant  for tremors and laryngeal dysfunction both of which are baseline  2.  CBC/CMP patient is chronically anemic but currently has  a significant drop from her last labs which were 1 year ago.  She does not appear to be actively bleeding.  Denies black, red stools  3.  EKG sinus tachycardia  4.  Cardiac enzymes with elevated troponin at 0.166  5.  Chest x-ray with possible enlarged cardiac silhouette  6.  Urinalysis  7.    8.  Normal saline 500 cc  9.  Patient received 325 aspirin in the ambulance  10.  Patient appears moderately short of breath without significantly increased work of breathing.  On 2 L nasal cannula patient maintains an O2 sat of 97%.  Trial off the oxygen drops to 88% on room air.  Patient does not have a history of COPD.  She is not oxygen dependent at home.  11.  Patient's past chart reviewed.  It appears that she had an NSTEMI with subsequent cardiac catheterization in July of 2016 at [facility].  At that time she was diagnosed with 2 vessel coronary disease, normal systolic ventricular function, diastolic dysfunction.  12.  Blood transfusion 2 units of PRBCs initiated for demand ischemia, consent signed  13.  B12/folate/iron/ferritin obtained prior to transfusion  14.  Protonix 80 IV  15.  ABG on room air   16.  CT PE pending  17.  Patient transferred to [facility], accepting Dr. Martinez (hospital )  18.  CT resulted as multiple pulmonary emboli, heparin started, Dr. Martinez notified      Abnormal lab values  Labs Reviewed   CBC W/ AUTO DIFFERENTIAL   COMPREHENSIVE METABOLIC PANEL   TROPONIN I   CK   CK-MB   B-TYPE NATRIURETIC PEPTIDE   URINALYSIS       Medications Given  Medications   sodium chloride 0.9% bolus 500 mL (500 mLs Intravenous New Bag 1/25/19 0615)           Diagnosis  -- nSTEMI  --dyspnea  --anemia, unknown etiology or chronicity  --pulmonary emboli    Disposition and Plan  -- Disposition:  Transfer  -- Condition: stable         Ashley Whitney,  MD  01/25/19 1033       Ashley Whitney MD  01/25/19 3684

## 2019-01-25 NOTE — PLAN OF CARE
"Outside Transfer Acceptance Note     Patients name:  Katarina Rivera MRN [id number]     Transferring Physician or Mid-Level provider/Clinic giving report:   Dr. Ashley Whitney at [facility] ED    Accepting Physician for admission to hospital: Matt Martinez M.D.     Consulting Physicians: Dr. Mic Sinclair, Cardiology at [place]    Acceptance date:  01/25/2019    Reason for transfer:   Anemia, CP, SOB    Report from Physician/Mid-Level Provider:    Chief Complaint  84 y.o. female with Shortness of Breath     History of Present Illness  "Katarina Rivera presents to the emergency room with complaints of shortness of breath, generalized weakness, and pressure in her chest.  Patient's 84-year-old highly functioning female who typically walks, cooks, drives.  She lives with her daughter.  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  She reports that she does not have associated cough or congestion.  She does not feel sick other than noticing that she feels weaker and more tired than normal."    The patient has a history of ulcerative colitis and CAD (see [facility] below on 7/25/16) for which no PCI has been done.  She now comes with progressive shortness of breath and chest pressure.  She has received ASA and NTG with minimal improvement in her symptoms.  She was found to have an O2 saturation of 88% on room air (now 98% on 2L nc), and a drop in her Hg to 7.9g from baseline of 11.2 on 10/26/17.  Dr. Sinclair, Cardiology at [place], felt this was more reflective of type II ischemia from anemia and did not want to initiate ACS or unstable angina protocol.    CTA was ultimately ordered and revealed: Extensive bilateral pulmonary thromboemboli involving the bilateral lobar pulmonary arteries extending into segmental and subsegmental branches.  There is evidence for right heart strain.    To Do List upon arrival:    Asked Dr. Whitney at [facility] to perform the following:   -Check B12, folate, Fe " studies, ABG on RA   -Ordering CTA chest    -Protonix 40mg iv   Serial H/H  Serial Troponins  Consult Cardiology, place on tele  Dr. Whitney is initiating 2 units of blood now  The patient is being started on IV heparin infusion now  Patient is to now go to MICU as a level 1 transfer    ADDENDUM:  CTA CHEST: Positive for multiple PTE's     CTA CHEST NON CORONARY    CLINICAL HISTORY:  Chest pain, acute, PE suspected, high pretest prob;    TECHNIQUE:  Low dose axial images, sagittal and coronal reformations were obtained from the thoracic inlet to the lung bases following the IV administration of 75 mL of Omnipaque 350.  Contrast timing was optimized to evaluate the pulmonary arteries.  MIP images were performed.    COMPARISON:  07/25/2016    FINDINGS:  Extensive bilateral pulmonary thromboemboli are noted involving all lobar branches extending into segmental and subsegmental branches.  There is straightening of the interventricular septum suggesting right heart strain.  There is no evidence for aortic dissection.    The thyroid appears normal.  The main central airways are patent.  The esophagus appears normal.  The heart is normal in size.  No pericardial effusion.  No mediastinal, hilar or axillary adenopathy is seen.    The mild emphysematous disease is noted.  No consolidation or pleural effusions are identified.    Limited evaluation of the upper abdominal structures show a left-sided adrenal gland nodule measuring 1.7 cm, incompletely imaged.    Age-appropriate degenerative changes affect the skeleton.      Impression       Extensive bilateral pulmonary thromboemboli involving the bilateral lobar pulmonary arteries extending into segmental and subsegmental branches.  There is evidence for right heart strain.    Indeterminate left adrenal gland nodule.  Further evaluation with a dedicated CT scan of the abdomen, adrenal mass protocol, is recommended for further evaluation after resolution of acute symptoms.   Alternatively, if prior imaging is available, it could be submitted for comparison purposes.    Findings were discussed with Ashley Whitney at 11:01 on1/25/2019.      Electronically signed by: Erica Chase MD  Date: 01/25/2019  Time: 11:10       CLINICAL REVIEW:  Past Medical History:  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        Review of patient's allergies indicates:  No Known Allergies    Vital Signs:  Temp:  [97.2 °F (36.2 °C)] 97.2 °F (36.2 °C)  Pulse:  [111-123] 123  Resp:  [18-32] 20  SpO2:  [96 %-99 %] 96 %  BP: (130-165)/(64-72) 143/67    LABS:  Results for BELKIS WHITE (MRN [id number]) as of 1/25/2019 09:57   Ref. Range 10/26/2017 12:10 1/25/2019 06:48   Hemoglobin Latest Ref Range: 12.0 - 16.0 g/dL 11.2 (L) 7.9 (L)   Hematocrit Latest Ref Range: 37.0 - 48.5 % 36.9 (L) 27.7 (L)   MCV Latest Ref Range: 82 - 98 fL 88 64 (L)     Recent Results (from the past 24 hour(s))   CBC auto differential    Collection Time: 01/25/19  6:48 AM   Result Value Ref Range    WBC 13.16 (H) 3.90 - 12.70 K/uL    RBC 4.33 4.00 - 5.40 M/uL    Hemoglobin 7.9 (L) 12.0 - 16.0 g/dL    Hematocrit 27.7 (L) 37.0 - 48.5 %    MCV 64 (L) 82 - 98 fL    MCH 18.2 (L) 27.0 - 31.0 pg    MCHC 28.5 (L) 32.0 - 36.0 g/dL    RDW 19.2 (H) 11.5 - 14.5 %    Platelets 412 (H) 150 - 350 K/uL    MPV 9.7 9.2 - 12.9 fL    Lymph # CANCELED 1.0 - 4.8 K/uL    Mono # CANCELED 0.3 - 1.0 K/uL    Eos # CANCELED 0.0 - 0.5 K/uL    Baso # CANCELED 0.00 - 0.20 K/uL    Gran% 96.0 (H) 38.0 - 73.0 %    Lymph% 1.0 (L) 18.0 - 48.0 %    Mono% 2.0 (L) 4.0 - 15.0 %    Eosinophil% 0.0 0.0 - 8.0 %    Basophil% 1.0 0.0 - 1.9 %    Platelet Estimate Increased (A)     Aniso Moderate     Poik Slight     Poly Occasional     Hypo Moderate     Acanthocytes Present     Differential Method Manual    Comprehensive metabolic panel    Collection Time: 01/25/19  6:48 AM   Result Value  Ref Range    Sodium 141 136 - 145 mmol/L    Potassium 3.9 3.5 - 5.1 mmol/L    Chloride 109 95 - 110 mmol/L    CO2 21 (L) 23 - 29 mmol/L    Glucose 162 (H) 70 - 110 mg/dL    BUN, Bld 20 8 - 23 mg/dL    Creatinine 1.1 0.5 - 1.4 mg/dL    Calcium 9.1 8.7 - 10.5 mg/dL    Total Protein 7.4 6.0 - 8.4 g/dL    Albumin 3.5 3.5 - 5.2 g/dL    Total Bilirubin 0.6 0.1 - 1.0 mg/dL    Alkaline Phosphatase 106 55 - 135 U/L    AST 10 10 - 40 U/L    ALT 8 (L) 10 - 44 U/L    Anion Gap 11 8 - 16 mmol/L    eGFR if African American 53 (A) >60 mL/min/1.73 m^2    eGFR if non African American 46 (A) >60 mL/min/1.73 m^2   Troponin I    Collection Time: 01/25/19  6:48 AM   Result Value Ref Range    Troponin I 0.166 (H) 0.000 - 0.026 ng/mL   CPK    Collection Time: 01/25/19  6:48 AM   Result Value Ref Range    CPK 39 20 - 180 U/L   CK-MB    Collection Time: 01/25/19  6:48 AM   Result Value Ref Range    CPK 39 20 - 180 U/L    CPK MB 2.2 0.1 - 6.5 ng/mL    MB% 5.6 (H) 0.0 - 5.0 %   Brain natriuretic peptide    Collection Time: 01/25/19  6:48 AM   Result Value Ref Range     (H) 0 - 99 pg/mL       Radiographs:   XR CHEST PA AND LATERAL    CLINICAL HISTORY:  Dyspnea, unspecified    TECHNIQUE:  PA and lateral views of the chest were performed.    COMPARISON:  08/18/2016    FINDINGS:  Trace left-sided pleural effusion.  The lungs are hyperexpanded, but essentially clear with very minimal pulmonary vascular congestion centrally.  The heart is mildly enlarged.  Calcified atheromatous disease affects the aorta.  Age-appropriate degenerative changes affect the skeleton.      Impression       As above.      Electronically signed by: Erica Chase MD  Date: 01/25/2019     EKG:  Vent. Rate : 115 BPM     Atrial Rate : 115 BPM     P-R Int : 150 ms          QRS Dur : 072 ms      QT Int : 336 ms       P-R-T Axes : 037 052 063 degrees     QTc Int : 464 ms  Sinus tachycardia  Nonspecific ST abnormality  Abnormal ECG  When compared with ECG of 13-SEP-2016  10:22,  Vent. rate has increased BY  56 BPM  Nonspecific T wave abnormality now evident in Anterior leads  Confirmed by Alfa Singh MD ([id number]) on 1/25/2019 9:54:44 AM  SELF           Confirmed By:Alfa Singh MD     cath:  E. Angiographic Results        Patient has a right dominant coronary artery.    - Left Main Coronary Artery:             The ostial LM is normal. There is HERBER 3 flow.     - Left Anterior Descending Artery:             The proximal LAD has a 0% stenosis. There is HERBER 3 flow. The remaining portion of the vessel is normal.             The mid LAD has a 60% stenosis. There is HERBER 3 flow. The remaining portion of the vessel has luminal irregularities.     - D2:             The ostial D2 has a 70% stenosis. There is HERBER 3 flow. The remaining portion of the vessel has luminal irregularities.     - Left Circumflex Artery:             The mid LCX has a 50% stenosis. There is HERBER 3 flow.     - Right Coronary Artery:             The mid RCA has a 40% stenosis. There is HERBER 3 flow. The remaining portion of the vessel has luminal irregularities.    Date of Procedure: 07/28/2016    TTE with bubble:  Technical Quality: This is a technically good study. Doppler was not ordered or charged for, but a very limited exam was performed.   Aorta: The aortic root is normal in size.   Left Atrium: The left atrium is normal in size.   Left Ventricle: The left ventricle is normal in size. LV wall thickness is normal. Global left ventricular systolic function appears normal. Visually estimated ejection fraction is 60-65%.   Right Atrium: The right atrium is normal in size.   Right Ventricle: The right ventricle is normal in size. Global right ventricular systolic function appears normal.   Atrial Septum: The atrial septum is intact.   Shunt: This study was performed in conjunction with intravenous bubble contrast.   Intracavitary: There is no evidence of pericardial effusion, intracavity mass, thrombi, or  vegetation.     CONCLUSIONS     1 - Normal left ventricular systolic function (EF 60-65%).   negative bubble study.    Medications:  No current facility-administered medications for this encounter.     Current Outpatient Medications:     atorvastatin (LIPITOR) 20 MG tablet, Take 20 mg by mouth once daily., Disp: , Rfl:     furosemide (LASIX) 40 MG tablet, Take 1 tablet (40 mg total) by mouth once daily., Disp: 30 tablet, Rfl: 11    latanoprost 0.005 % ophthalmic solution, Place 1 drop into both eyes every evening. , Disp: , Rfl:     metoprolol tartrate (LOPRESSOR) 100 MG tablet, Take 100 mg by mouth 2 (two) times daily., Disp: , Rfl:     nitroGLYCERIN (NITROSTAT) 0.4 MG SL tablet, Place 1 tablet (0.4 mg total) under the tongue every 5 (five) minutes as needed for Chest pain., Disp: 25 tablet, Rfl: 11    sulfaSALAzine (AZULFIDINE) 500 mg Tab, Take 1 tablet (500 mg total) by mouth 2 (two) times daily., Disp: 60 tablet, Rfl: 5    Facility-Administered Medications Ordered in Other Encounters:     0.9%  NaCl infusion (for blood administration), , Intravenous, Q24H PRN, Ashley Whitney MD    sodium chloride 0.9% bolus 1,000 mL, 1,000 mL, Intravenous, ED 1 Time, Ashley Whitney MD

## 2019-01-26 LAB
ALBUMIN SERPL BCP-MCNC: 3.2 G/DL
ALP SERPL-CCNC: 98 U/L
ALT SERPL W/O P-5'-P-CCNC: 6 U/L
ANION GAP SERPL CALC-SCNC: 7 MMOL/L
ANISOCYTOSIS BLD QL SMEAR: SLIGHT
ANISOCYTOSIS BLD QL SMEAR: SLIGHT
APTT BLDCRRT: 45.1 SEC
APTT BLDCRRT: 47.9 SEC
APTT BLDCRRT: 48.8 SEC
APTT BLDCRRT: 76.2 SEC
AST SERPL-CCNC: 14 U/L
BASOPHILS # BLD AUTO: 0.01 K/UL
BASOPHILS # BLD AUTO: 0.01 K/UL
BASOPHILS NFR BLD: 0.1 %
BASOPHILS NFR BLD: 0.1 %
BILIRUB SERPL-MCNC: 0.7 MG/DL
BUN SERPL-MCNC: 25 MG/DL
CALCIUM SERPL-MCNC: 8.8 MG/DL
CHLORIDE SERPL-SCNC: 107 MMOL/L
CO2 SERPL-SCNC: 25 MMOL/L
CREAT SERPL-MCNC: 1.4 MG/DL
DIFFERENTIAL METHOD: ABNORMAL
DIFFERENTIAL METHOD: ABNORMAL
EOSINOPHIL # BLD AUTO: 0.1 K/UL
EOSINOPHIL # BLD AUTO: 0.1 K/UL
EOSINOPHIL NFR BLD: 0.6 %
EOSINOPHIL NFR BLD: 0.6 %
ERYTHROCYTE [DISTWIDTH] IN BLOOD BY AUTOMATED COUNT: 26.2 %
ERYTHROCYTE [DISTWIDTH] IN BLOOD BY AUTOMATED COUNT: 26.2 %
EST. GFR  (AFRICAN AMERICAN): 40 ML/MIN/1.73 M^2
EST. GFR  (NON AFRICAN AMERICAN): 35 ML/MIN/1.73 M^2
FERRITIN SERPL-MCNC: 55 NG/ML
FOLATE SERPL-MCNC: 2.6 NG/ML
GLUCOSE SERPL-MCNC: 128 MG/DL
HAPTOGLOB SERPL-MCNC: 212 MG/DL
HCT VFR BLD AUTO: 36.5 %
HCT VFR BLD AUTO: 36.5 %
HGB BLD-MCNC: 10.7 G/DL
HGB BLD-MCNC: 10.7 G/DL
HYPOCHROMIA BLD QL SMEAR: ABNORMAL
HYPOCHROMIA BLD QL SMEAR: ABNORMAL
INR PPP: 1
IRON SERPL-MCNC: 14 UG/DL
LYMPHOCYTES # BLD AUTO: 1.1 K/UL
LYMPHOCYTES # BLD AUTO: 1.1 K/UL
LYMPHOCYTES NFR BLD: 13.5 %
LYMPHOCYTES NFR BLD: 13.5 %
MAGNESIUM SERPL-MCNC: 2.4 MG/DL
MCH RBC QN AUTO: 21.7 PG
MCH RBC QN AUTO: 21.7 PG
MCHC RBC AUTO-ENTMCNC: 29.3 G/DL
MCHC RBC AUTO-ENTMCNC: 29.3 G/DL
MCV RBC AUTO: 74 FL
MCV RBC AUTO: 74 FL
MONOCYTES # BLD AUTO: 0.8 K/UL
MONOCYTES # BLD AUTO: 0.8 K/UL
MONOCYTES NFR BLD: 10.3 %
MONOCYTES NFR BLD: 10.3 %
NEUTROPHILS # BLD AUTO: 6 K/UL
NEUTROPHILS # BLD AUTO: 6 K/UL
NEUTROPHILS NFR BLD: 75.5 %
NEUTROPHILS NFR BLD: 75.5 %
OVALOCYTES BLD QL SMEAR: ABNORMAL
OVALOCYTES BLD QL SMEAR: ABNORMAL
PHOSPHATE SERPL-MCNC: 3.2 MG/DL
PLATELET # BLD AUTO: 278 K/UL
PLATELET # BLD AUTO: 278 K/UL
PLATELET BLD QL SMEAR: ABNORMAL
PLATELET BLD QL SMEAR: ABNORMAL
PMV BLD AUTO: 10.3 FL
PMV BLD AUTO: 10.3 FL
POIKILOCYTOSIS BLD QL SMEAR: SLIGHT
POIKILOCYTOSIS BLD QL SMEAR: SLIGHT
POLYCHROMASIA BLD QL SMEAR: ABNORMAL
POLYCHROMASIA BLD QL SMEAR: ABNORMAL
POTASSIUM SERPL-SCNC: 4.1 MMOL/L
PROT SERPL-MCNC: 6.7 G/DL
PROTHROMBIN TIME: 10.8 SEC
RBC # BLD AUTO: 4.94 M/UL
RBC # BLD AUTO: 4.94 M/UL
SATURATED IRON: 3 %
SCHISTOCYTES BLD QL SMEAR: ABNORMAL
SCHISTOCYTES BLD QL SMEAR: ABNORMAL
SODIUM SERPL-SCNC: 139 MMOL/L
TOTAL IRON BINDING CAPACITY: 429 UG/DL
TRANSFERRIN SERPL-MCNC: 290 MG/DL
TROPONIN I SERPL DL<=0.01 NG/ML-MCNC: 0.07 NG/ML
TROPONIN I SERPL DL<=0.01 NG/ML-MCNC: 0.12 NG/ML
TROPONIN I SERPL DL<=0.01 NG/ML-MCNC: 0.15 NG/ML
WBC # BLD AUTO: 8.02 K/UL
WBC # BLD AUTO: 8.02 K/UL

## 2019-01-26 PROCEDURE — 36415 COLL VENOUS BLD VENIPUNCTURE: CPT

## 2019-01-26 PROCEDURE — 63600175 PHARM REV CODE 636 W HCPCS: Performed by: STUDENT IN AN ORGANIZED HEALTH CARE EDUCATION/TRAINING PROGRAM

## 2019-01-26 PROCEDURE — 25000003 PHARM REV CODE 250: Performed by: STUDENT IN AN ORGANIZED HEALTH CARE EDUCATION/TRAINING PROGRAM

## 2019-01-26 PROCEDURE — 84466 ASSAY OF TRANSFERRIN: CPT

## 2019-01-26 PROCEDURE — 83010 ASSAY OF HAPTOGLOBIN QUANT: CPT

## 2019-01-26 PROCEDURE — 83735 ASSAY OF MAGNESIUM: CPT

## 2019-01-26 PROCEDURE — 11000001 HC ACUTE MED/SURG PRIVATE ROOM

## 2019-01-26 PROCEDURE — 85730 THROMBOPLASTIN TIME PARTIAL: CPT | Mod: 91

## 2019-01-26 PROCEDURE — 85610 PROTHROMBIN TIME: CPT

## 2019-01-26 PROCEDURE — 80053 COMPREHEN METABOLIC PANEL: CPT

## 2019-01-26 PROCEDURE — 99223 1ST HOSP IP/OBS HIGH 75: CPT | Mod: ,,, | Performed by: INTERNAL MEDICINE

## 2019-01-26 PROCEDURE — 82728 ASSAY OF FERRITIN: CPT

## 2019-01-26 PROCEDURE — 85025 COMPLETE CBC W/AUTO DIFF WBC: CPT

## 2019-01-26 PROCEDURE — 99223 PR INITIAL HOSPITAL CARE,LEVL III: ICD-10-PCS | Mod: ,,, | Performed by: INTERNAL MEDICINE

## 2019-01-26 PROCEDURE — 84484 ASSAY OF TROPONIN QUANT: CPT | Mod: 91

## 2019-01-26 PROCEDURE — 84100 ASSAY OF PHOSPHORUS: CPT

## 2019-01-26 PROCEDURE — 82746 ASSAY OF FOLIC ACID SERUM: CPT

## 2019-01-26 PROCEDURE — 85730 THROMBOPLASTIN TIME PARTIAL: CPT

## 2019-01-26 PROCEDURE — 83540 ASSAY OF IRON: CPT

## 2019-01-26 RX ORDER — FAMOTIDINE 20 MG/1
20 TABLET, FILM COATED ORAL 2 TIMES DAILY
Status: DISCONTINUED | OUTPATIENT
Start: 2019-01-26 | End: 2019-01-28 | Stop reason: HOSPADM

## 2019-01-26 RX ADMIN — FAMOTIDINE 20 MG: 20 TABLET ORAL at 08:01

## 2019-01-26 RX ADMIN — METOPROLOL TARTRATE 100 MG: 50 TABLET ORAL at 08:01

## 2019-01-26 RX ADMIN — RAMELTEON 8 MG: 8 TABLET, FILM COATED ORAL at 12:01

## 2019-01-26 RX ADMIN — HEPARIN SODIUM AND DEXTROSE 21 UNITS/KG/HR: 10000; 5 INJECTION INTRAVENOUS at 06:01

## 2019-01-26 RX ADMIN — FUROSEMIDE 40 MG: 40 TABLET ORAL at 08:01

## 2019-01-26 RX ADMIN — ATORVASTATIN CALCIUM 20 MG: 20 TABLET, FILM COATED ORAL at 08:01

## 2019-01-26 RX ADMIN — FAMOTIDINE 20 MG: 20 TABLET ORAL at 09:01

## 2019-01-26 RX ADMIN — HEPARIN SODIUM AND DEXTROSE 19 UNITS/KG/HR: 10000; 5 INJECTION INTRAVENOUS at 07:01

## 2019-01-26 RX ADMIN — LATANOPROST 1 DROP: 50 SOLUTION OPHTHALMIC at 09:01

## 2019-01-26 NOTE — PLAN OF CARE
Problem: Adult Inpatient Plan of Care  Goal: Plan of Care Review  Outcome: Ongoing (interventions implemented as appropriate)  Plan of care reviewed with patient and daughter. Voice understanding. NSR on monitor with no red alarms noted. Heparin gtt infusing at 19unit/kg/hr. Next ptt at 1910. No acute distress noted at this time. Side rails x3, bed low, call bell within reach. Maintain bed alarm for patient safety. Patient will be monitored overnight.

## 2019-01-26 NOTE — PROGRESS NOTES
VN note: VN cued into pt's room for introduction and to welcome the patient to the floor. VN informed pt that VN would be working closely along side bedside nurse, PCT, and the rest of care team and making rounds throughout the shift. Pt verbalized understanding. Allowed time for questions. Patient denies any pain or discomfort at this time.   Patient educated on safety to call before getting up, because we don't want the patient to fall and harm themselves in any way.  VN will continue to be available to patient and intervene prn.        01/26/19 1145   Type of Frequent Check   Type Patient Rounds  (VN rounding)   Safety/Activity   Patient Rounds visualized patient;ID band on;call light in reach   Safety Promotion/Fall Prevention side rails raised x 2   Safety Precautions emergency equipment at bedside   Positioning   Body Position supine   Head of Bed (HOB) HOB at 30-45 degrees   Pain/Comfort/Sleep   Preferred Pain Scale number (Numeric Rating Pain Scale)   Comfort/Acceptable Pain Level 2   Pain Rating (0-10): Rest 0   Pain Rating (0-10): Activity 0       Cardiac/Telemetry Details / Alarms   Cardiac/Telemetry Monitor On Yes   Cardiac/Telemetry Audible Yes   Cardiac/Telemetry Alarms Set Yes   Assessments (Pre/Post)   Level of Consciousness (AVPU) alert

## 2019-01-26 NOTE — CONSULTS
[facility]  Cardiology  Consult Note    Patient Name: Katarina Rivera  MRN: [id number]  Admission Date: 1/25/2019  Hospital Length of Stay: 1 days  Code Status: Full Code   Attending Provider: Severyn Yaroshevsky, MD   Consulting Provider: Mic Sinclair MD  Primary Care Physician: Ethan Denton MD  Principal Problem:Bilateral pulmonary embolism    Patient information was obtained from patient and ER records.     Inpatient consult to [facility]  Consult performed by: Mic Sinclair MD  Consult ordered by: Lanre Escalera MD        Subjective:     Chief Complaint:  Weakness and SOB     HPI:   83 y/o female with hx of HFpEF (EF 65%), atrial flutter previously on chronic AC with Xarelto and most recently Eliquis (quit about 1 month ago bc too expensive), had Type II NSTEMI (demand likely from PNA and Aflutter - [facility] with non obtsructive disease) 2016, HTN, HLD, hx of ROSA M, hx of anemia requiring PRBC transfusion, deconditioning who presented to ED with weakness and SOB and found to have bilateral PE. US with RLE DVT. Trop elevated and CT with evidence of RH strain. No tPA or catheter directed thrombolysis due to anemia (H/H lower than baseline). Transfused 2 Units PRBC, stable serial H/H and started on heparin gtt. Symptoms have improved and she is hemodynamically stable. Denies CP, orthopnea, PND, syncope.     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        Past Surgical History:   Procedure Laterality Date    APPENDECTOMY      COLONOSCOPY      COLONOSCOPY N/A 11/10/2015    Performed by [facility] at [facility] ([facility])    COLONOSCOPY N/A 6/12/2014    Performed by Michael [facility] MD at [facility] END[facility] (4TH FLR)    COLONOSCOPY N/A 3/14/2013    Performed by Michael San MD at [facility] END[facility] (4TH FLR)    hemorrhoidectomy      HYSTERECTOMY      OOPHORECTOMY      TOTAL KNEE  ARTHROPLASTY      left        Review of patient's allergies indicates:  No Known Allergies    Current Facility-Administered Medications on File Prior to Encounter   Medication    [DISCONTINUED] 0.9%  NaCl infusion (for blood administration)    [DISCONTINUED] heparin 25,000 units in dextrose 5% (100 units/ml) IV bolus from bag - ADDITIONAL PRN BOLUS - 30 units/kg    [DISCONTINUED] heparin 25,000 units in dextrose 5% (100 units/ml) IV bolus from bag - ADDITIONAL PRN BOLUS - 60 units/kg    [DISCONTINUED] heparin 25,000 units in dextrose 5% 250 mL (100 units/mL) infusion HIGH INTENSITY nomogram - [facility]     Current Outpatient Medications on File Prior to Encounter   Medication Sig    atorvastatin (LIPITOR) 20 MG tablet Take 20 mg by mouth once daily.    furosemide (LASIX) 40 MG tablet Take 1 tablet (40 mg total) by mouth once daily.    latanoprost 0.005 % ophthalmic solution Place 1 drop into both eyes every evening.     metoprolol tartrate (LOPRESSOR) 100 MG tablet Take 100 mg by mouth 2 (two) times daily.    nitroGLYCERIN (NITROSTAT) 0.4 MG SL tablet Place 1 tablet (0.4 mg total) under the tongue every 5 (five) minutes as needed for Chest pain.    sulfaSALAzine (AZULFIDINE) 500 mg Tab Take 1 tablet (500 mg total) by mouth 2 (two) times daily.     Family History     Problem Relation (Age of Onset)    Blindness Father    Stroke Mother        Tobacco Use    Smoking status: Never Smoker    Smokeless tobacco: Never Used   Substance and Sexual Activity    Alcohol use: No    Drug use: No    Sexual activity: Not Currently     Review of Systems   Constitution: Positive for malaise/fatigue. Negative for weakness.   HENT: Negative for congestion.    Eyes: Negative for blurred vision.   Cardiovascular: Negative for chest pain, claudication, cyanosis, dyspnea on exertion, irregular heartbeat, leg swelling, near-syncope, orthopnea, palpitations, paroxysmal nocturnal dyspnea and syncope.   Respiratory: Positive for  shortness of breath.    Endocrine: Negative for polyuria.   Hematologic/Lymphatic: Negative for bleeding problem.   Skin: Negative for itching and rash.   Musculoskeletal: Negative for joint swelling, muscle cramps and muscle weakness.   Gastrointestinal: Negative for abdominal pain, hematemesis, hematochezia, melena, nausea and vomiting.   Genitourinary: Negative for dysuria and hematuria.   Neurological: Negative for dizziness, focal weakness, headaches, light-headedness and loss of balance.   Psychiatric/Behavioral: Negative for depression. The patient is not nervous/anxious.      Objective:     Vital Signs (Most Recent):  Temp: 96.4 °F (35.8 °C) (01/26/19 1707)  Pulse: 71 (01/26/19 1707)  Resp: 18 (01/26/19 1707)  BP: (!) 99/55 (01/26/19 1707)  SpO2: 99 % (01/26/19 1145) Vital Signs (24h Range):  Temp:  [96.4 °F (35.8 °C)-98.2 °F (36.8 °C)] 96.4 °F (35.8 °C)  Pulse:  [] 71  Resp:  [17-50] 18  SpO2:  [92 %-100 %] 99 %  BP: ()/(53-70) 99/55     Weight: 61.6 kg (135 lb 12.9 oz)  Body mass index is 24.06 kg/m².    SpO2: 99 %  O2 Device (Oxygen Therapy): nasal cannula      Intake/Output Summary (Last 24 hours) at 1/26/2019 1709  Last data filed at 1/26/2019 0554  Gross per 24 hour   Intake 409.42 ml   Output 225 ml   Net 184.42 ml       Lines/Drains/Airways     Peripheral Intravenous Line                 Peripheral IV - Single Lumen 01/25/19 0549 Left Antecubital 1 day         Peripheral IV - Single Lumen 01/25/19 0935 Right Antecubital 1 day                Physical Exam   Constitutional: She is oriented to person, place, and time. She appears well-developed and well-nourished.   HENT:   Head: Normocephalic and atraumatic.   Neck: Neck supple. No JVD present.   Cardiovascular: Normal rate, regular rhythm and normal heart sounds.   Pulses:       Carotid pulses are 2+ on the right side, and 2+ on the left side.       Radial pulses are 2+ on the right side, and 2+ on the left side.        Femoral pulses are  2+ on the right side, and 2+ on the left side.       Posterior tibial pulses are 1+ on the right side, and 1+ on the left side.   Pulmonary/Chest: Effort normal and breath sounds normal.   Abdominal: Soft. Bowel sounds are normal.   Musculoskeletal: She exhibits no edema.   Neurological: She is alert and oriented to person, place, and time.   Skin: Skin is warm and dry.   Psychiatric: She has a normal mood and affect. Her behavior is normal. Thought content normal.       Significant Labs:   BMP:   Recent Labs   Lab 01/25/19  0648 01/26/19  0602   * 128*    139   K 3.9 4.1    107   CO2 21* 25   BUN 20 25*   CREATININE 1.1 1.4   CALCIUM 9.1 8.8   MG  --  2.4   , CMP   Recent Labs   Lab 01/25/19  0648 01/26/19  0602    139   K 3.9 4.1    107   CO2 21* 25   * 128*   BUN 20 25*   CREATININE 1.1 1.4   CALCIUM 9.1 8.8   PROT 7.4 6.7   ALBUMIN 3.5 3.2*   BILITOT 0.6 0.7   ALKPHOS 106 98   AST 10 14   ALT 8* 6*   ANIONGAP 11 7*   ESTGFRAFRICA 53* 40*   EGFRNONAA 46* 35*   , CBC   Recent Labs   Lab 01/25/19  1843 01/25/19  2128 01/26/19  0344   WBC 21.87* 15.40* 8.02  8.02   HGB 10.3* 10.4* 10.7*  10.7*   HCT 33.7* 34.9* 36.5*  36.5*   * 341 278  278   , INR   Recent Labs   Lab 01/25/19  1842 01/25/19 2128 01/26/19  0343   INR 1.0 1.0 1.0   , Lipid Panel No results for input(s): CHOL, HDL, LDLCALC, TRIG, CHOLHDL in the last 48 hours. and Troponin   Recent Labs   Lab 01/26/19  0343 01/26/19  0815 01/26/19  1510   TROPONINI 0.152* 0.125* 0.074*       Assessment and Plan:     Active Diagnoses:    Diagnosis Date Noted POA    PRINCIPAL PROBLEM:  Bilateral pulmonary embolism [I26.99] 01/25/2019 Unknown    Chest pain [R07.9] 01/25/2019 Yes    Diastolic dysfunction [I51.9] 07/29/2016 Yes    Atrial flutter [I48.92] 07/25/2016 Yes    NSTEMI (non-ST elevated myocardial infarction) [I21.4] 07/23/2016 Yes    Ulcerative colitis [K51.90] 07/26/2012 Yes    Osteoarthritis [M19.90]  07/26/2012 Yes      Problems Resolved During this Admission:     Pulmonary Embolism - submassive per criteria and hemodynamically stable. Doing well on heparin gtt and H/H stable. Continue for now and may require coumadin as outpatient as cannot afford DOAC.     Anemia - unclear etiology and w/u underway. Recommend GIB w/u also.    VTE Risk Mitigation (From admission, onward)        Ordered     heparin 25,000 units in dextrose 5% (100 units/ml) IV bolus from bag INITIAL BOLUS  Once      01/25/19 2117     heparin 25,000 units in dextrose 5% 250 mL (100 units/mL) infusion HIGH INTENSITY nomogram - [facility]  Continuous      01/25/19 2117     heparin 25,000 units in dextrose 5% (100 units/ml) IV bolus from bag - ADDITIONAL PRN BOLUS - 60 units/kg  As needed (PRN)      01/25/19 2117     heparin 25,000 units in dextrose 5% (100 units/ml) IV bolus from bag - ADDITIONAL PRN BOLUS - 30 units/kg  As needed (PRN)      01/25/19 2117     IP VTE HIGH RISK PATIENT  Once      01/25/19 2108          Thank you for your consult. I will follow-up with patient. Please contact us if you have any additional questions.    Mic Sinclair MD  Cardiology   [facility]

## 2019-01-26 NOTE — NURSING
Pt received to room 254 from [facility] per EMS. Dx Bilateral P.E. Oriented x4. Placed on continuous monitoring. Denies CP or SOB. NC at 4 liters. Inst re POC,call light. Bed alarm on for safety.

## 2019-01-26 NOTE — PROGRESS NOTES
[facility] Hospital Medicine  Progress Note    Patient Name: Katarina Rivera  MRN: [id number]  Patient Class: IP- Inpatient   Admission Date: 1/25/2019  Length of Stay: 1 days  Attending Physician: Severyn Yaroshevsky, MD  Primary Care Provider: Ethan Denton MD        Subjective:     Principal Problem:Bilateral pulmonary embolism    HPI: The patient w/ pmhx of atrial flutter, UC, CHF w/ DD and HLD presents with sob and weakness after at trip to the [facility] in [facility] a few days ago. She took the bus and said it took about 12 hours, after she got home the symtoms started. At home she is ambulatory, pap smear and colonoscopy all uptodate and all normal, no hx of malignancy or blood disorder. She denies any bleeding on any part of her body, no dark stool. She is not taking her UC med daily. She denies ha, cp, sob, abd/urinary complaints, n/v/d/c, f/c.        Hospital Course: The patient reports she is doing a lot better with breathing, she feels she's not as weak anymore.   She is heparin drip titrated to reach theurapeutic level this am.      Review of Systems   She denies any bleeding, ha, cp, sob, urinary/bowel complaints, f/c, n/v/d/c.  Objective:     Vital Signs (Most Recent):  Temp: 97.9 °F (36.6 °C) (01/26/19 0715)  Pulse: 77 (01/26/19 1130)  Resp: 18 (01/26/19 1030)  BP: (!) 98/56 (01/26/19 1030)  SpO2: 99 % (01/26/19 1030) Vital Signs (24h Range):  Temp:  [97.9 °F (36.6 °C)-99 °F (37.2 °C)] 97.9 °F (36.6 °C)  Pulse:  [] 77  Resp:  [17-50] 18  SpO2:  [92 %-100 %] 99 %  BP: ()/(53-72) 98/56     Weight: 61.6 kg (135 lb 12.9 oz)  Body mass index is 24.06 kg/m².    Intake/Output Summary (Last 24 hours) at 1/26/2019 1142  Last data filed at 1/26/2019 0554  Gross per 24 hour   Intake 409.42 ml   Output 225 ml   Net 184.42 ml      Physical Exam  GEN: NAD  HEENT: MMM, anicteric, eomi, atraumatic  CV: RRR, neg murmur, pulse pos+   Lung: CTAB  Abd: soft, nt, nd, bs+  Skin warm and moist,  no skin breakdown  Neuro: aox4, tremor at baseline for 40yrs.  Ext: good ROM, good strength in all extremities.       Recent Labs     01/25/19 1843 01/25/19 2128 01/26/19  0344   WBC 21.87* 15.40* 8.02  8.02   HGB 10.3* 10.4* 10.7*  10.7*   HCT 33.7* 34.9* 36.5*  36.5*   * 341 278  278   MCV 72* 72* 74*  74*   RDW 27.5* 28.0* 26.2*  26.2*       Recent Labs     01/25/19  0648 01/26/19  0602    139   K 3.9 4.1    107   CO2 21* 25   * 128*   BUN 20 25*   CREATININE 1.1 1.4   CALCIUM 9.1 8.8   PROT 7.4 6.7   ALBUMIN 3.5 3.2*   BILITOT 0.6 0.7   ALKPHOS 106 98   AST 10 14   ALT 8* 6*   ANIONGAP 11 7*   ESTGFRAFRICA 53* 40*   EGFRNONAA 46* 35*       Recent Labs     01/26/19  0602   MG 2.4   PHOS 3.2       Coags  Lab Results   Component Value Date    INR 1.0 01/26/2019    INR 1.0 01/25/2019    INR 1.0 01/25/2019    APTT 76.2 (H) 01/26/2019    APTT 47.9 (H) 01/26/2019    APTT 29.5 01/25/2019     Recent Labs   Lab 01/25/19  1842 01/25/19 2128 01/25/19 2245 01/26/19 0343 01/26/19  0602   INR 1.0 1.0  --  1.0  --    APTT 52.4* 30.0 29.5 47.9* 76.2*       A1c:   Lab Results   Component Value Date    HGBA1C 5.9 07/12/2013   , Last Gluc: No results for input(s): POCTGLUCOSE in the last 168 hours.    TSH: No results found for: TSH      Cardiac Enzymes  Recent Labs     01/25/19  0648 01/25/19 2128 01/26/19 0343 01/26/19  0815   TROPONINI 0.166* 0.198* 0.152* 0.125*   CPKMB 2.2  --   --   --    CPK 39  39  --   --   --          Urinalysis  Urinalysis  No results for input(s): COLORU, CLARITYU, SPECGRAV, PHUR, PROTEINUA, GLUCOSEU, BILIRUBINCON, BLOODU, WBCU, RBCU, BACTERIA, MUCUS, NITRITE, LEUKOCYTESUR, UROBILINOGEN, HYALINECASTS in the last 24 hours.  No results for input(s): COLORU, CLARITYU, SPECGRAV, PHUR, PROTEINUA, GLUCOSEU, BLOODU, WBCU, RBCU, BACTERIA, MUCUS in the last 24 hours.    Invalid input(s):  BILIRUBINCON    Micro  Microbiology Results (last 7 days)     ** No results found for  the last 168 hours. **             ABG  Recent Labs   Lab 01/25/19  1104   PH 7.41   PCO2 31*   PO2 54*   HCO3 19.60*   POCSATURATED 90.5   BE -4.50*         Imaging         Assessment/Plan:    The patient w/ pmhx of atrial flutter, UC, CHF w/ DD and HLD presents with sob and weakness found to have saman segmental PE.     Saman PE  On heparin drip titrating per normogram and aptt  Received 2 uprbc, H/H has been stable, no sign of acute bleeding  F/u fobt  Some right heart strain     Microcytic anemia  mcv low, rdw high  F/u iron studies  Will need iron and folic acid after fobt is collected     R  LE DVT  Confirmed by u/s dvt   Management per above     Chest pain  Resolved     HF w/ DD  Not in acute exacerbation  Resume home lasix, bnp elevated.     Atrial flutter  Rate controlled by metoprolol  Stable. EKG shows sinus tachy  Consulted O-cards am     NSTEMI  Trop trending up 0.5 peaked, now downtrending, w/o ekg st changes       UC  Stable  Not in acute exacerbation, not really taking home med at home.     OA   Stable  Tylenol prn.     Dispo: cnt heparin drip within therapeutic range, f/u cards am, f/u fobt.Improving clinically, plan transfer to floor. Will start folic acid/iron tab after fobt collected.      Active Diagnoses:    Diagnosis Date Noted POA    PRINCIPAL PROBLEM:  Bilateral pulmonary embolism [I26.99] 01/25/2019 Unknown    Chest pain [R07.9] 01/25/2019 Yes    Diastolic dysfunction [I51.9] 07/29/2016 Yes    Atrial flutter [I48.92] 07/25/2016 Yes    NSTEMI (non-ST elevated myocardial infarction) [I21.4] 07/23/2016 Yes    Ulcerative colitis [K51.90] 07/26/2012 Yes    Osteoarthritis [M19.90] 07/26/2012 Yes      Problems Resolved During this Admission:     VTE Risk Mitigation (From admission, onward)        Ordered     heparin 25,000 units in dextrose 5% (100 units/ml) IV bolus from bag INITIAL BOLUS  Once      01/25/19 2117     heparin 25,000 units in dextrose 5% 250 mL (100 units/mL) infusion HIGH  INTENSITY nomogram - [facility]  Continuous      01/25/19 2117     heparin 25,000 units in dextrose 5% (100 units/ml) IV bolus from bag - ADDITIONAL PRN BOLUS - 60 units/kg  As needed (PRN)      01/25/19 2117     heparin 25,000 units in dextrose 5% (100 units/ml) IV bolus from bag - ADDITIONAL PRN BOLUS - 30 units/kg  As needed (PRN)      01/25/19 2117     IP VTE HIGH RISK PATIENT  Once      01/25/19 2108             Estefania Bishop MD  Department of Hospital Medicine   [facility]

## 2019-01-26 NOTE — H&P
[facility] Hospital Medicine  History & Physical    Patient Name: Katarina Rivera  MRN: [id number]  Admission Date: 1/25/2019  Attending Physician: Severyn Yaroshevsky, MD   Primary Care Provider: Ethan Denton MD         Patient information was obtained from patient, past medical records and ER records.     Subjective:     Principal Problem:Bilateral pulmonary embolism    Chief Complaint: sob and weak       HPI: The patient w/ pmhx of atrial flutter, UC, CHF w/ DD and HLD presents with sob and weakness after at trip to the [facility] in [facility] a few days ago. She took the bus and said it took about 12 hours, after she got home the symtoms started. At home she is ambulatory, pap smear and colonoscopy all uptodate and all normal, no hx of malignancy or blood disorder. She denies any bleeding on any part of her body, no dark stool. She is not taking her UC med daily. She denies ha, cp, sob, abd/urinary complaints, n/v/d/c, f/c.    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        Past Surgical History:   Procedure Laterality Date    APPENDECTOMY      COLONOSCOPY      COLONOSCOPY N/A 11/10/2015    [facility]formed [facility] ([facility]TH F[facility])    COLONOSCOPY N/A 6/12/2014    Performed by [facility] at [facility] ENDO (4TH FLR)    COLONOSCOPY N/A 3/14/2013    Performed by [facility] at [facility] ENDO (4TH FLR)    hemorrhoidectomy      HYSTERECTOMY      OOPHORECTOMY      TOTAL KNEE ARTHROPLASTY      left        Review of patient's allergies indicates:  No Known Allergies    Current Facility-Administered Medications on File Prior to Encounter   Medication    [COMPLETED] heparin 25,000 units in dextrose 5% (100 units/ml) IV bolus from bag INITIAL BOLUS    [COMPLETED] morphine injection 2 mg    [COMPLETED] nitroGLYCERIN 2% TD oint ointment 0.5 inch    [COMPLETED] omnipaque 350 iohexol  100 mL    [COMPLETED] ondansetron disintegrating tablet 4 mg    [COMPLETED] pantoprazole injection 80 mg    [COMPLETED] sodium chloride 0.9% bolus 1,000 mL    [COMPLETED] sodium chloride 0.9% bolus 500 mL    [DISCONTINUED] 0.9%  NaCl infusion (for blood administration)    [DISCONTINUED] aspirin EC tablet 325 mg    [DISCONTINUED] heparin (porcine) injection 5,000 Units    [DISCONTINUED] heparin 25,000 units in dextrose 5% (100 units/ml) IV bolus from bag - ADDITIONAL PRN BOLUS - 30 units/kg    [DISCONTINUED] heparin 25,000 units in dextrose 5% (100 units/ml) IV bolus from bag - ADDITIONAL PRN BOLUS - 60 units/kg    [DISCONTINUED] heparin 25,000 units in dextrose 5% 250 mL (100 units/mL) infusion HIGH INTENSITY nomogram - [facility]     Current Outpatient Medications on File Prior to Encounter   Medication Sig    atorvastatin (LIPITOR) 20 MG tablet Take 20 mg by mouth once daily.    furosemide (LASIX) 40 MG tablet Take 1 tablet (40 mg total) by mouth once daily.    latanoprost 0.005 % ophthalmic solution Place 1 drop into both eyes every evening.     metoprolol tartrate (LOPRESSOR) 100 MG tablet Take 100 mg by mouth 2 (two) times daily.    nitroGLYCERIN (NITROSTAT) 0.4 MG SL tablet Place 1 tablet (0.4 mg total) under the tongue every 5 (five) minutes as needed for Chest pain.    sulfaSALAzine (AZULFIDINE) 500 mg Tab Take 1 tablet (500 mg total) by mouth 2 (two) times daily.    [DISCONTINUED] apixaban (ELIQUIS) 2.5 mg Tab Take 2.5 mg by mouth 2 (two) times daily.    [DISCONTINUED] aspirin (ECOTRIN) 81 MG EC tablet Take 81 mg by mouth once daily.      [DISCONTINUED] ketorolac (TORADOL) 10 mg tablet Take 1 tablet (10 mg total) by mouth 4 (four) times daily as needed for Pain.     Family History     Problem Relation (Age of Onset)    Blindness Father    Stroke Mother        Tobacco Use    Smoking status: Never Smoker    Smokeless tobacco: Never Used   Substance and Sexual Activity    Alcohol use: No     Drug use: No    Sexual activity: Not Currently     Review of Systems see above  Objective:     Vital Signs (Most Recent):  Pulse: 105 (01/25/19 2049)  Resp: (!) 23 (01/25/19 2049)  BP: 127/68 (01/25/19 2049)  SpO2: 97 % (01/25/19 2049) Vital Signs (24h Range):  Temp:  [97.2 °F (36.2 °C)-99 °F (37.2 °C)] 98.2 °F (36.8 °C)  Pulse:  [105-123] 105  Resp:  [18-32] 23  SpO2:  [92 %-99 %] 97 %  BP: (114-165)/(56-72) 127/68     Weight: 61.6 kg (135 lb 12.9 oz)  Body mass index is 24.06 kg/m².    Physical Exam   GEN: NAD  CV: sinus tachy, neg murmur, pulse pos+   Lung: CTAB  Abd: soft, nt, nd, bs+  Skin warm and moist, no skin breakdown  Neuro: aox4  Ext: good ROM, good strength in all extremities.    Recent Labs     01/25/19  0648 01/25/19  1843 01/25/19  2128   WBC 13.16* 21.87* 15.40*   HGB 7.9* 10.3* 10.4*   HCT 27.7* 33.7* 34.9*   * 375* 341   MCV 64* 72* 72*   RDW 19.2* 27.5* 28.0*       Recent Labs     01/25/19  0648      K 3.9      CO2 21*   *   BUN 20   CREATININE 1.1   CALCIUM 9.1   PROT 7.4   ALBUMIN 3.5   BILITOT 0.6   ALKPHOS 106   AST 10   ALT 8*   ANIONGAP 11   ESTGFRAFRICA 53*   EGFRNONAA 46*       No results for input(s): MG, PHOS in the last 72 hours.    Coags  Lab Results   Component Value Date    INR 1.0 01/25/2019    INR 1.0 01/25/2019    INR 1.0 01/25/2019    APTT 29.5 01/25/2019    APTT 30.0 01/25/2019    APTT 52.4 (H) 01/25/2019     Recent Labs   Lab 01/25/19  1217 01/25/19  1842 01/25/19 2128 01/25/19 2245   INR 1.0 1.0 1.0  --    APTT 28.0 52.4* 30.0 29.5       A1c:   Lab Results   Component Value Date    HGBA1C 5.9 07/12/2013   , Last Gluc: No results for input(s): POCTGLUCOSE in the last 168 hours.    TSH: No results found for: TSH      Cardiac Enzymes  Recent Labs     01/25/19  0648 01/25/19 2128   TROPONINI 0.166* 0.198*   CPKMB 2.2  --    CPK 39  39  --          Urinalysis  Urinalysis  No results for input(s): COLORU, CLARITYU, SPECGRAV, PHUR, PROTEINUA, GLUCOSEU,  BILIRUBINCON, BLOODU, WBCU, RBCU, BACTERIA, MUCUS, NITRITE, LEUKOCYTESUR, UROBILINOGEN, HYALINECASTS in the last 24 hours.  No results for input(s): COLORU, CLARITYU, SPECGRAV, PHUR, PROTEINUA, GLUCOSEU, BLOODU, WBCU, RBCU, BACTERIA, MUCUS in the last 24 hours.    Invalid input(s):  BILIRUBINCON    Micro  Microbiology Results (last 7 days)     ** No results found for the last 168 hours. **             ABG  Recent Labs   Lab 01/25/19  1104   PH 7.41   PCO2 31*   PO2 54*   HCO3 19.60*   POCSATURATED 90.5   BE -4.50*         Imaging         Assessment/Plan:   The patient w/ pmhx of atrial flutter, UC, CHF w/ DD and HLD presents with sob and weakness found to have saman segmental PE.    Saman PE  On heparin drip titrating per normogram and aptt  Received 2 uprbc, H/H has been stable, no sign of acute bleeding  F/u fobt  Some right heart strain    Microcytic anemia  mcv low, rdw high  F/u iron studies    R  LE DVT  Confirmed by u/s dvt   Management per above    Chest pain  Resolved    HF w/ DD  Not in acute exacerbation  Resume home lasix, bnp elevated.    Atrial flutter  Rate controlled by metoprolol  Stable. EKG shows sinus tachy  Consulted O-cards am    NSTEMI  Trop trending up 0.5, w/o ekg st changes  Will trend x3    UC  Stable  Will resume home med     OA   Stable  Tylenol prn.    Dispo: cnt heparin drip, f/u cards am, f/u fobt.Improving    Active Diagnoses:    Diagnosis Date Noted POA    PRINCIPAL PROBLEM:  Bilateral pulmonary embolism [I26.99] 01/25/2019 Unknown    Chest pain [R07.9] 01/25/2019 Yes    Diastolic dysfunction [I51.9] 07/29/2016 Yes    Atrial flutter [I48.92] 07/25/2016 Yes    NSTEMI (non-ST elevated myocardial infarction) [I21.4] 07/23/2016 Yes    Ulcerative colitis [K51.90] 07/26/2012 Yes    Osteoarthritis [M19.90] 07/26/2012 Yes      Problems Resolved During this Admission:     VTE Risk Mitigation (From admission, onward)        Ordered     heparin 25,000 units in dextrose 5% (100 units/ml) IV  bolus from bag INITIAL BOLUS  Once      01/25/19 2117     heparin 25,000 units in dextrose 5% 250 mL (100 units/mL) infusion HIGH INTENSITY nomogram - [facility]  Continuous      01/25/19 2117     heparin 25,000 units in dextrose 5% (100 units/ml) IV bolus from bag - ADDITIONAL PRN BOLUS - 60 units/kg  As needed (PRN)      01/25/19 2117     heparin 25,000 units in dextrose 5% (100 units/ml) IV bolus from bag - ADDITIONAL PRN BOLUS - 30 units/kg  As needed (PRN)      01/25/19 2117     IP VTE HIGH RISK PATIENT  Once      01/25/19 2108        Critical care time spent on the evaluation and treatment of severe organ dysfunction, review of pertinent labs and imaging studies, discussions with consulting providers and discussions with patient/family: 40 minutes.    Estefania Bishop MD  Department of Hospital Medicine   [facility]

## 2019-01-27 LAB
ACANTHOCYTES BLD QL SMEAR: PRESENT
ALBUMIN SERPL BCP-MCNC: 3 G/DL
ALP SERPL-CCNC: 103 U/L
ALT SERPL W/O P-5'-P-CCNC: 9 U/L
ANION GAP SERPL CALC-SCNC: 10 MMOL/L
ANISOCYTOSIS BLD QL SMEAR: SLIGHT
APTT BLDCRRT: 42.3 SEC
AST SERPL-CCNC: 15 U/L
BASOPHILS # BLD AUTO: 0.04 K/UL
BASOPHILS NFR BLD: 0.4 %
BILIRUB SERPL-MCNC: 0.6 MG/DL
BUN SERPL-MCNC: 33 MG/DL
CALCIUM SERPL-MCNC: 8.7 MG/DL
CHLORIDE SERPL-SCNC: 106 MMOL/L
CO2 SERPL-SCNC: 23 MMOL/L
CREAT SERPL-MCNC: 1.2 MG/DL
DACRYOCYTES BLD QL SMEAR: ABNORMAL
DIFFERENTIAL METHOD: ABNORMAL
EOSINOPHIL # BLD AUTO: 0.4 K/UL
EOSINOPHIL NFR BLD: 3.4 %
ERYTHROCYTE [DISTWIDTH] IN BLOOD BY AUTOMATED COUNT: 26.3 %
EST. GFR  (AFRICAN AMERICAN): 48 ML/MIN/1.73 M^2
EST. GFR  (NON AFRICAN AMERICAN): 42 ML/MIN/1.73 M^2
GLUCOSE SERPL-MCNC: 109 MG/DL
HCT VFR BLD AUTO: 33.4 %
HGB BLD-MCNC: 9.8 G/DL
HYPOCHROMIA BLD QL SMEAR: ABNORMAL
INR PPP: 1
LYMPHOCYTES # BLD AUTO: 1.9 K/UL
LYMPHOCYTES NFR BLD: 17.8 %
MAGNESIUM SERPL-MCNC: 2.1 MG/DL
MCH RBC QN AUTO: 21.2 PG
MCHC RBC AUTO-ENTMCNC: 29.3 G/DL
MCV RBC AUTO: 72 FL
MONOCYTES # BLD AUTO: 1.3 K/UL
MONOCYTES NFR BLD: 12.1 %
NEUTROPHILS # BLD AUTO: 6.9 K/UL
NEUTROPHILS NFR BLD: 66.3 %
OB PNL STL: NEGATIVE
OVALOCYTES BLD QL SMEAR: ABNORMAL
PHOSPHATE SERPL-MCNC: 3.2 MG/DL
PLATELET # BLD AUTO: 310 K/UL
PLATELET BLD QL SMEAR: ABNORMAL
PMV BLD AUTO: 10.7 FL
POIKILOCYTOSIS BLD QL SMEAR: SLIGHT
POTASSIUM SERPL-SCNC: 3.8 MMOL/L
PROT SERPL-MCNC: 6.5 G/DL
PROTHROMBIN TIME: 10.3 SEC
RBC # BLD AUTO: 4.63 M/UL
SODIUM SERPL-SCNC: 139 MMOL/L
WBC # BLD AUTO: 10.59 K/UL

## 2019-01-27 PROCEDURE — 80053 COMPREHEN METABOLIC PANEL: CPT

## 2019-01-27 PROCEDURE — 25000003 PHARM REV CODE 250: Performed by: STUDENT IN AN ORGANIZED HEALTH CARE EDUCATION/TRAINING PROGRAM

## 2019-01-27 PROCEDURE — 99233 PR SUBSEQUENT HOSPITAL CARE,LEVL III: ICD-10-PCS | Mod: ,,, | Performed by: INTERNAL MEDICINE

## 2019-01-27 PROCEDURE — 27000221 HC OXYGEN, UP TO 24 HOURS

## 2019-01-27 PROCEDURE — 85610 PROTHROMBIN TIME: CPT

## 2019-01-27 PROCEDURE — 11000001 HC ACUTE MED/SURG PRIVATE ROOM

## 2019-01-27 PROCEDURE — 63600175 PHARM REV CODE 636 W HCPCS: Performed by: STUDENT IN AN ORGANIZED HEALTH CARE EDUCATION/TRAINING PROGRAM

## 2019-01-27 PROCEDURE — 94761 N-INVAS EAR/PLS OXIMETRY MLT: CPT

## 2019-01-27 PROCEDURE — 84100 ASSAY OF PHOSPHORUS: CPT

## 2019-01-27 PROCEDURE — 99233 SBSQ HOSP IP/OBS HIGH 50: CPT | Mod: ,,, | Performed by: INTERNAL MEDICINE

## 2019-01-27 PROCEDURE — 36415 COLL VENOUS BLD VENIPUNCTURE: CPT

## 2019-01-27 PROCEDURE — 85730 THROMBOPLASTIN TIME PARTIAL: CPT

## 2019-01-27 PROCEDURE — 85025 COMPLETE CBC W/AUTO DIFF WBC: CPT

## 2019-01-27 PROCEDURE — 83735 ASSAY OF MAGNESIUM: CPT

## 2019-01-27 PROCEDURE — 82272 OCCULT BLD FECES 1-3 TESTS: CPT

## 2019-01-27 RX ORDER — FOLIC ACID 5 MG/ML
1 INJECTION, SOLUTION INTRAMUSCULAR; INTRAVENOUS; SUBCUTANEOUS DAILY
Status: DISCONTINUED | OUTPATIENT
Start: 2019-01-27 | End: 2019-01-27

## 2019-01-27 RX ORDER — WARFARIN SODIUM 5 MG/1
5 TABLET ORAL DAILY
Status: DISCONTINUED | OUTPATIENT
Start: 2019-01-27 | End: 2019-01-28 | Stop reason: HOSPADM

## 2019-01-27 RX ORDER — FOLIC ACID 1 MG/1
1 TABLET ORAL DAILY
Status: DISCONTINUED | OUTPATIENT
Start: 2019-01-27 | End: 2019-01-28 | Stop reason: HOSPADM

## 2019-01-27 RX ADMIN — Medication 1 TABLET: at 06:01

## 2019-01-27 RX ADMIN — ATORVASTATIN CALCIUM 20 MG: 20 TABLET, FILM COATED ORAL at 08:01

## 2019-01-27 RX ADMIN — FAMOTIDINE 20 MG: 20 TABLET ORAL at 09:01

## 2019-01-27 RX ADMIN — FOLIC ACID 1 MG: 1 TABLET ORAL at 06:01

## 2019-01-27 RX ADMIN — METOPROLOL TARTRATE 100 MG: 50 TABLET ORAL at 08:01

## 2019-01-27 RX ADMIN — LATANOPROST 1 DROP: 50 SOLUTION OPHTHALMIC at 09:01

## 2019-01-27 RX ADMIN — HEPARIN SODIUM AND DEXTROSE 19 UNITS/KG/HR: 10000; 5 INJECTION INTRAVENOUS at 04:01

## 2019-01-27 RX ADMIN — METOPROLOL TARTRATE 100 MG: 50 TABLET ORAL at 09:01

## 2019-01-27 RX ADMIN — WARFARIN SODIUM 5 MG: 5 TABLET ORAL at 05:01

## 2019-01-27 RX ADMIN — FUROSEMIDE 40 MG: 40 TABLET ORAL at 08:01

## 2019-01-27 RX ADMIN — FAMOTIDINE 20 MG: 20 TABLET ORAL at 08:01

## 2019-01-27 NOTE — PROGRESS NOTES
VN note: VN cued into pt's room for introduction. VN informed pt that VN would be working closely along side bedside nurse, PCT, and the rest of care team and making rounds throughout the shift. Pt verbalized understanding. Allowed time for questions. Patient denies any pain or discomfort at this time.   Patient educated on safety to call before getting up, because we don't want the patient to fall and harm themselves in any way.  VN will continue to be available to patient and intervene prn.        01/27/19 1101   Type of Frequent Check   Type Patient Rounds  (Vn rounding)   Safety/Activity   Patient Rounds visualized patient;ID band on;call light in reach   Safety Promotion/Fall Prevention side rails raised x 2   Safety Precautions emergency equipment at bedside   Positioning   Body Position supine   Head of Bed (HOB) HOB at 30-45 degrees   Pain/Comfort/Sleep   Preferred Pain Scale number (Numeric Rating Pain Scale)   Comfort/Acceptable Pain Level 2   Pain Rating (0-10): Rest 0   Pain Rating (0-10): Activity 0       Cardiac/Telemetry Details / Alarms   Cardiac/Telemetry Monitor On Yes   Cardiac/Telemetry Audible Yes   Cardiac/Telemetry Alarms Set Yes   Assessments (Pre/Post)   Level of Consciousness (AVPU) alert

## 2019-01-27 NOTE — CONSULTS
[facility]  Cardiology  Progress Note    Patient Name: Katarina Rivera  MRN: [id number]  Admission Date: 1/25/2019  Hospital Length of Stay: 2 days  Code Status: Full Code   Attending Provider: Severyn Yaroshevsky, MD   Consulting Provider: Mic Sinclair MD  Primary Care Physician: Ethan Denton MD  Principal Problem:Bilateral pulmonary embolism    Patient information was obtained from patient and ER records.     Consults  Subjective:     Chief Complaint:  Weakness and SOB     HPI:   83 y/o female with hx of HFpEF (EF 65%), atrial flutter previously on chronic AC with Xarelto and most recently Eliquis (quit about 1 month ago bc too expensive), [facility] 2016, HTN, HLD, hx of ROSA M, hx of anemia requiring PRBC transfusion, deconditioning who presented to ED with weakness and SOB and found to have bilateral PE. US with RLE DVT. Trop elevated and CT with evidence of RH strain. No tPA or catheter directed thrombolysis due to anemia (H/H lower than baseline). Transfused 2 Units PRBC, stable serial H/H and started on heparin gtt. Symptoms have improved and she is hemodynamically stable. Denies CP, orthopnea, PND, syncope.     1/27/2019  Overnight no acute events. Coumadin started yesterday. Pt states she thinks she can afford Xarelto. Hemodynamically stable and symptoms improved. H/H slightly lower today.     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        Past Surgical History:   Procedure Laterality Date    APPENDECTOMY      COLONOSCOPY      COLONOSCOPY N/A 11/10/2015    Performed by [facility] at [facility] (4TH FLR)    COLONOSCOPY N/A 6/12/2014    Performed by [facility] San [facility] at [facility] (4TH FLR)    COLONOSCOPY N/A 3/14/2013    Performed by Michael San [facility] at [facility] END[facility] (4TH FLR)    hemorrhoidectomy       HYSTERECTOMY      OOPHORECTOMY      TOTAL KNEE ARTHROPLASTY      left        Review of patient's allergies indicates:  No Known Allergies    No current facility-administered medications on file prior to encounter.      Current Outpatient Medications on File Prior to Encounter   Medication Sig    atorvastatin (LIPITOR) 20 MG tablet Take 20 mg by mouth once daily.    furosemide (LASIX) 40 MG tablet Take 1 tablet (40 mg total) by mouth once daily.    latanoprost 0.005 % ophthalmic solution Place 1 drop into both eyes every evening.     metoprolol tartrate (LOPRESSOR) 100 MG tablet Take 100 mg by mouth 2 (two) times daily.    nitroGLYCERIN (NITROSTAT) 0.4 MG SL tablet Place 1 tablet (0.4 mg total) under the tongue every 5 (five) minutes as needed for Chest pain.    sulfaSALAzine (AZULFIDINE) 500 mg Tab Take 1 tablet (500 mg total) by mouth 2 (two) times daily.     Family History     Problem Relation (Age of Onset)    Blindness Father    Stroke Mother        Tobacco Use    Smoking status: Never Smoker    Smokeless tobacco: Never Used   Substance and Sexual Activity    Alcohol use: No    Drug use: No    Sexual activity: Not Currently     Review of Systems   Constitution: Positive for malaise/fatigue. Negative for weakness.   HENT: Negative for congestion.    Eyes: Negative for blurred vision.   Cardiovascular: Negative for chest pain, claudication, cyanosis, dyspnea on exertion, irregular heartbeat, leg swelling, near-syncope, orthopnea, palpitations, paroxysmal nocturnal dyspnea and syncope.   Respiratory: Positive for shortness of breath.    Endocrine: Negative for polyuria.   Hematologic/Lymphatic: Negative for bleeding problem.   Skin: Negative for itching and rash.   Musculoskeletal: Negative for joint swelling, muscle cramps and muscle weakness.   Gastrointestinal: Negative for abdominal pain, hematemesis, hematochezia, melena, nausea and vomiting.   Genitourinary: Negative for dysuria and  hematuria.   Neurological: Negative for dizziness, focal weakness, headaches, light-headedness and loss of balance.   Psychiatric/Behavioral: Negative for depression. The patient is not nervous/anxious.      Objective:     Vital Signs (Most Recent):  Temp: 98.9 °F (37.2 °C) (01/27/19 1203)  Pulse: 73 (01/27/19 1203)  Resp: 16 (01/27/19 1203)  BP: (!) 113/59 (01/27/19 1203)  SpO2: (!) 90 % (01/27/19 1254) Vital Signs (24h Range):  Temp:  [96.4 °F (35.8 °C)-98.9 °F (37.2 °C)] 98.9 °F (37.2 °C)  Pulse:  [69-75] 73  Resp:  [16-19] 16  SpO2:  [90 %-97 %] 90 %  BP: ()/(55-63) 113/59     Weight: 58.5 kg (128 lb 15.5 oz)  Body mass index is 22.85 kg/m².    SpO2: (!) 90 %  O2 Device (Oxygen Therapy): room air      Intake/Output Summary (Last 24 hours) at 1/27/2019 1313  Last data filed at 1/27/2019 0956  Gross per 24 hour   Intake 407.97 ml   Output --   Net 407.97 ml       Lines/Drains/Airways     Peripheral Intravenous Line                 Peripheral IV - Single Lumen 01/25/19 0549 Left Antecubital 2 days         Peripheral IV - Single Lumen 01/26/19 1907 Right Hand less than 1 day                Physical Exam   Constitutional: She is oriented to person, place, and time. She appears well-developed and well-nourished.   HENT:   Head: Normocephalic and atraumatic.   Neck: Neck supple. No JVD present.   Cardiovascular: Normal rate, regular rhythm and normal heart sounds.   Pulses:       Carotid pulses are 2+ on the right side, and 2+ on the left side.       Radial pulses are 2+ on the right side, and 2+ on the left side.        Femoral pulses are 2+ on the right side, and 2+ on the left side.       Posterior tibial pulses are 1+ on the right side, and 1+ on the left side.   Pulmonary/Chest: Effort normal and breath sounds normal.   Abdominal: Soft. Bowel sounds are normal.   Musculoskeletal: She exhibits no edema.   Neurological: She is alert and oriented to person, place, and time.   Skin: Skin is warm and dry.    Psychiatric: She has a normal mood and affect. Her behavior is normal. Thought content normal.       Significant Labs:   BMP:   Recent Labs   Lab 01/26/19  0602 01/27/19  0504   * 109    139   K 4.1 3.8    106   CO2 25 23   BUN 25* 33*   CREATININE 1.4 1.2   CALCIUM 8.8 8.7   MG 2.4 2.1   , CMP   Recent Labs   Lab 01/26/19  0602 01/27/19  0504    139   K 4.1 3.8    106   CO2 25 23   * 109   BUN 25* 33*   CREATININE 1.4 1.2   CALCIUM 8.8 8.7   PROT 6.7 6.5   ALBUMIN 3.2* 3.0*   BILITOT 0.7 0.6   ALKPHOS 98 103   AST 14 15   ALT 6* 9*   ANIONGAP 7* 10   ESTGFRAFRICA 40* 48*   EGFRNONAA 35* 42*   , CBC   Recent Labs   Lab 01/25/19 2128 01/26/19  0344 01/27/19  0504   WBC 15.40* 8.02  8.02 10.59   HGB 10.4* 10.7*  10.7* 9.8*   HCT 34.9* 36.5*  36.5* 33.4*    278  278 310   , INR   Recent Labs   Lab 01/25/19 2128 01/26/19  0343 01/27/19  0504   INR 1.0 1.0 1.0   , Lipid Panel No results for input(s): CHOL, HDL, LDLCALC, TRIG, CHOLHDL in the last 48 hours. and Troponin   Recent Labs   Lab 01/26/19  0343 01/26/19  0815 01/26/19  1510   TROPONINI 0.152* 0.125* 0.074*       Assessment and Plan:     Active Diagnoses:    Diagnosis Date Noted POA    PRINCIPAL PROBLEM:  Bilateral pulmonary embolism [I26.99] 01/25/2019 Yes    Chest pain [R07.9] 01/25/2019 Yes    Diastolic dysfunction [I51.9] 07/29/2016 Yes    Atrial flutter [I48.92] 07/25/2016 Yes    NSTEMI (non-ST elevated myocardial infarction) [I21.4] 07/23/2016 Yes    Ulcerative colitis [K51.90] 07/26/2012 Yes    Osteoarthritis [M19.90] 07/26/2012 Yes      Problems Resolved During this Admission:     Pulmonary Embolism - submassive per criteria and hemodynamically stable. Doing well on heparin gtt and H/H stable. Does not want coumadin ideally, and thinks she can afford Xarelto. Need to clarify this and determine which. If coumadin, needs bridge before D/C.     Anemia - unclear etiology and w/u underway. Recommend  GIB w/u also. H/H slightly lower today - continue to monitor    VTE Risk Mitigation (From admission, onward)        Ordered     warfarin (COUMADIN) tablet 5 mg  Daily      01/27/19 0642     heparin 25,000 units in dextrose 5% (100 units/ml) IV bolus from bag INITIAL BOLUS  Once      01/25/19 2117     heparin 25,000 units in dextrose 5% 250 mL (100 units/mL) infusion HIGH INTENSITY nomogram - [facility]  Continuous      01/25/19 2117     heparin 25,000 units in dextrose 5% (100 units/ml) IV bolus from bag - ADDITIONAL PRN BOLUS - 60 units/kg  As needed (PRN)      01/25/19 2117     heparin 25,000 units in dextrose 5% (100 units/ml) IV bolus from bag - ADDITIONAL PRN BOLUS - 30 units/kg  As needed (PRN)      01/25/19 2117     IP VTE HIGH RISK PATIENT  Once      01/25/19 2108          Thank you for your consult. I will follow-up with patient. Please contact us if you have any additional questions.    Mic Sinclair MD  Cardiology   [facility]

## 2019-01-27 NOTE — PLAN OF CARE
Problem: Adult Inpatient Plan of Care  Goal: Plan of Care Review  Outcome: Ongoing (interventions implemented as appropriate)  Pt received on nasal cannula at  4  lpm.  SPO2   97%.  Pt in no apparent respiratory distress.  Will continue to monitor.

## 2019-01-27 NOTE — PLAN OF CARE
Problem: Adult Inpatient Plan of Care  Goal: Plan of Care Review  Outcome: Ongoing (interventions implemented as appropriate)  Patient had  an unevenful night. Nurse went over plan of care with patient, questions encouraged. No signs of respiratory distress. Continuous heparin drip infusing at 11 cc/hr; aptt therapeutic at 45.1. She's NSR on continuous heart monitoring with no red alarms. Fall precautions maintained.  Bed locked and low, side rails X3, call bell within reach.

## 2019-01-27 NOTE — PROGRESS NOTES
[facility] Hospital Medicine  Progress Note    Patient Name: Katarina Rivera  MRN: [id number]  Patient Class: IP- Inpatient   Admission Date: 1/25/2019  Length of Stay: 2 days  Attending Physician: Severyn Yaroshevsky, MD  Primary Care Provider: Ethan Denton MD        Subjective:     Principal Problem:Bilateral pulmonary embolism    HPI: The patient w/ pmhx of atrial flutter, UC, CHF w/ DD and HLD presents with sob and weakness after at trip to the [facility] [place] a few days ago. She took the bus and said it took about 12 hours, after she got home the symtoms started. At home she is ambulatory, pap smear and colonoscopy all uptodate and all normal, no hx of malignancy or blood disorder. She denies any bleeding on any part of her body, no dark stool. She is not taking her UC med daily. She denies ha, cp, sob, abd/urinary complaints, n/v/d/c, f/c.        Hospital Course:     Still needing NC.  No pain.  Hungry. No chest pain.  Toelrating PO. Urinating well.  Cardiology following.      Review of Systems   She denies any bleeding, ha, cp, sob, urinary/bowel complaints, f/c, n/v/d/c.    Objective:     Vital Signs (Most Recent):  Temp: 98.8 °F (37.1 °C) (01/27/19 0801)  Pulse: 70 (01/27/19 0801)  Resp: 18 (01/27/19 0801)  BP: (!) 117/56 (01/27/19 0801)  SpO2: 99 % (01/26/19 1145) Vital Signs (24h Range):  Temp:  [96.4 °F (35.8 °C)-98.8 °F (37.1 °C)] 98.8 °F (37.1 °C)  Pulse:  [69-77] 70  Resp:  [17-19] 18  SpO2:  [99 %-100 %] 99 %  BP: ()/(55-63) 117/56     Weight: 58.5 kg (128 lb 15.5 oz)  Body mass index is 22.85 kg/m².    Intake/Output Summary (Last 24 hours) at 1/27/2019 0855  Last data filed at 1/27/2019 0600  Gross per 24 hour   Intake 282.97 ml   Output --   Net 282.97 ml      Physical Exam  GEN: NAD  HEENT: MMM, anicteric, eomi, atraumatic, hoarse voice  CV: RRR, neg murmur, pulse pos+   Lung: CTAB  Abd: soft, nt, nd, bs+  Skin warm and moist, no skin breakdown, multiple skin tags,    Neuro: aox4, tremor at baseline for 40yrs.  Ext: good ROM, good strength in all extremities.       Recent Labs     01/25/19 2128 01/26/19  0344 01/27/19  0504   WBC 15.40* 8.02  8.02 10.59   HGB 10.4* 10.7*  10.7* 9.8*   HCT 34.9* 36.5*  36.5* 33.4*    278  278 310   MCV 72* 74*  74* 72*   RDW 28.0* 26.2*  26.2* 26.3*       Recent Labs     01/25/19  0648 01/26/19  0602 01/27/19  0504    139 139   K 3.9 4.1 3.8    107 106   CO2 21* 25 23   * 128* 109   BUN 20 25* 33*   CREATININE 1.1 1.4 1.2   CALCIUM 9.1 8.8 8.7   PROT 7.4 6.7 6.5   ALBUMIN 3.5 3.2* 3.0*   BILITOT 0.6 0.7 0.6   ALKPHOS 106 98 103   AST 10 14 15   ALT 8* 6* 9*   ANIONGAP 11 7* 10   ESTGFRAFRICA 53* 40* 48*   EGFRNONAA 46* 35* 42*       Recent Labs     01/26/19  0602 01/27/19  0504   MG 2.4 2.1   PHOS 3.2 3.2       Coags  Lab Results   Component Value Date    INR 1.0 01/27/2019    INR 1.0 01/26/2019    INR 1.0 01/25/2019    APTT 42.3 (H) 01/27/2019    APTT 42.3 (H) 01/27/2019    APTT 42.3 (H) 01/27/2019     Recent Labs   Lab 01/25/19 2128 01/26/19  0343  01/26/19  1310 01/26/19  1705 01/27/19  0504   INR 1.0  --  1.0  --   --   --  1.0   APTT 30.0   < > 47.9*   < > 48.8* 45.1* 42.3*  42.3*  42.3*    < > = values in this interval not displayed.       A1c:   Lab Results   Component Value Date    HGBA1C 5.9 07/12/2013   , Last Gluc: No results for input(s): POCTGLUCOSE in the last 168 hours.    TSH: No results found for: TSH      Cardiac Enzymes  Recent Labs     01/25/19  0648  01/26/19  0343 01/26/19  0815 01/26/19  1510   TROPONINI 0.166*   < > 0.152* 0.125* 0.074*   CPKMB 2.2  --   --   --   --    CPK 39  39  --   --   --   --     < > = values in this interval not displayed.         Urinalysis  Urinalysis  No results for input(s): COLORU, CLARITYU, SPECGRAV, PHUR, PROTEINUA, GLUCOSEU, BILIRUBINCON, BLOODU, WBCU, RBCU, BACTERIA, MUCUS, NITRITE, LEUKOCYTESUR, UROBILINOGEN, HYALINECASTS in the last 24 hours.  No  results for input(s): COLORU, CLARITYU, SPECGRAV, PHUR, PROTEINUA, GLUCOSEU, BLOODU, WBCU, RBCU, BACTERIA, MUCUS in the last 24 hours.    Invalid input(s):  BILIRUBINCON    Micro  Microbiology Results (last 7 days)     ** No results found for the last 168 hours. **             ABG  Recent Labs   Lab 01/25/19  1104   PH 7.41   PCO2 31*   PO2 54*   HCO3 19.60*   POCSATURATED 90.5   BE -4.50*         Imaging         Assessment/Plan:    The patient w/ pmhx of atrial flutter, UC, CHF w/ DD and HLD presents with sob and weakness found to have saman segmental PE.     Saman PE  On heparin drip titrating per normogram and aptt  Received 2 uprbc, H/H has been stable, no sign of acute bleeding  Still trending down  FOBT neg  Some right heart strain     Microcytic anemia  mcv low, rdw high  Iron studies appear iron deficiency but mixed  Giving iron, folate      R  LE DVT  Confirmed by u/s dvt   Management per above     Chest pain  Resolved     HF w/ DD  Not in acute exacerbation  Resume home lasix, bnp elevated.     Atrial flutter  Rate controlled by metoprolol  Stable. EKG shows sinus tachy  Consulted O-cards am     NSTEMI  Trop trending up 0.5 peaked, now downtrending, w/o ekg st changes  Cardiology assisting    UC  Stable  Not in acute exacerbation, not really taking home med at home.     OA   Stable  Tylenol prn.     Dispo: [facility] to therapeutic coumadin.  Also will attempt to have xarelto filled by [facility] tomorrow to assess whether this is a realistic possibility.      Active Diagnoses:    Diagnosis Date Noted POA    PRINCIPAL PROBLEM:  Bilateral pulmonary embolism [I26.99] 01/25/2019 Yes    Chest pain [R07.9] 01/25/2019 Yes    Diastolic dysfunction [I51.9] 07/29/2016 Yes    Atrial flutter [I48.92] 07/25/2016 Yes    NSTEMI (non-ST elevated myocardial infarction) [I21.4] 07/23/2016 Yes    Ulcerative colitis [K51.90] 07/26/2012 Yes    Osteoarthritis [M19.90] 07/26/2012 Yes      Problems Resolved During this Admission:      VTE Risk Mitigation (From admission, onward)        Ordered     warfarin (COUMADIN) tablet 5 mg  Daily      01/27/19 0642     heparin 25,000 units in dextrose 5% (100 units/ml) IV bolus from bag INITIAL BOLUS  Once      01/25/19 2117     heparin 25,000 units in dextrose 5% 250 mL (100 units/mL) infusion HIGH INTENSITY nomogram - [facility]  Continuous      01/25/19 2117     heparin 25,000 units in dextrose 5% (100 units/ml) IV bolus from bag - ADDITIONAL PRN BOLUS - 60 units/kg  As needed (PRN)      01/25/19 2117     heparin 25,000 units in dextrose 5% (100 units/ml) IV bolus from bag - ADDITIONAL PRN BOLUS - 30 units/kg  As needed (PRN)      01/25/19 2117     IP VTE HIGH RISK PATIENT  Once      01/25/19 2108             Jr Aiken MD  Department of Hospital Medicine   [facility]

## 2019-01-28 ENCOUNTER — TELEPHONE (OUTPATIENT)
Dept: GASTROENTEROLOGY | Facility: CLINIC | Age: 84
End: 2019-01-28

## 2019-01-28 VITALS
WEIGHT: 132.94 LBS | HEART RATE: 71 BPM | TEMPERATURE: 99 F | SYSTOLIC BLOOD PRESSURE: 144 MMHG | BODY MASS INDEX: 23.55 KG/M2 | RESPIRATION RATE: 18 BRPM | HEIGHT: 63 IN | OXYGEN SATURATION: 90 % | DIASTOLIC BLOOD PRESSURE: 66 MMHG

## 2019-01-28 PROBLEM — I82.4Z3 ACUTE DEEP VEIN THROMBOSIS (DVT) OF DISTAL VEIN OF BOTH LOWER EXTREMITIES: Status: ACTIVE | Noted: 2019-01-28

## 2019-01-28 LAB
ALBUMIN SERPL BCP-MCNC: 3 G/DL
ALP SERPL-CCNC: 113 U/L
ALT SERPL W/O P-5'-P-CCNC: 7 U/L
ANION GAP SERPL CALC-SCNC: 10 MMOL/L
ANISOCYTOSIS BLD QL SMEAR: SLIGHT
AORTIC ROOT ANNULUS: 2.64 CM
AORTIC VALVE CUSP SEPERATION: 1.46 CM
APTT BLDCRRT: 35.9 SEC
AST SERPL-CCNC: 11 U/L
AV INDEX (PROSTH): 0.67
AV MEAN GRADIENT: 6.14 MMHG
AV PEAK GRADIENT: 13.1 MMHG
AV VALVE AREA: 1.78 CM2
AV VELOCITY RATIO: 0.65
BASOPHILS # BLD AUTO: 0.02 K/UL
BASOPHILS NFR BLD: 0.2 %
BILIRUB SERPL-MCNC: 0.7 MG/DL
BSA FOR ECHO PROCEDURE: 1.68 M2
BUN SERPL-MCNC: 25 MG/DL
CALCIUM SERPL-MCNC: 8.7 MG/DL
CHLORIDE SERPL-SCNC: 105 MMOL/L
CO2 SERPL-SCNC: 23 MMOL/L
CREAT SERPL-MCNC: 1.2 MG/DL
CV ECHO LV RWT: 0.32 CM
DIFFERENTIAL METHOD: ABNORMAL
DOP CALC AO PEAK VEL: 1.81 M/S
DOP CALC AO VTI: 32.33 CM
DOP CALC LVOT AREA: 2.66 CM2
DOP CALC LVOT DIAMETER: 1.84 CM
DOP CALC LVOT PEAK VEL: 1.19 M/S
DOP CALC LVOT STROKE VOLUME: 57.46 CM3
DOP CALCLVOT PEAK VEL VTI: 21.62 CM
E WAVE DECELERATION TIME: 176.92 MSEC
E/A RATIO: 0.81
ECHO LV POSTERIOR WALL: 0.73 CM (ref 0.6–1.1)
EOSINOPHIL # BLD AUTO: 0.3 K/UL
EOSINOPHIL NFR BLD: 2.6 %
ERYTHROCYTE [DISTWIDTH] IN BLOOD BY AUTOMATED COUNT: 26.4 %
EST. GFR  (AFRICAN AMERICAN): 48 ML/MIN/1.73 M^2
EST. GFR  (NON AFRICAN AMERICAN): 42 ML/MIN/1.73 M^2
FRACTIONAL SHORTENING: 47 % (ref 28–44)
GLUCOSE SERPL-MCNC: 112 MG/DL
HCT VFR BLD AUTO: 32.3 %
HGB BLD-MCNC: 9.6 G/DL
HYPOCHROMIA BLD QL SMEAR: ABNORMAL
INR PPP: 1.1
INTERVENTRICULAR SEPTUM: 0.8 CM (ref 0.6–1.1)
LA MAJOR: 4.65 CM
LA MINOR: 4.84 CM
LA WIDTH: 2.92 CM
LEFT ATRIUM SIZE: 2.98 CM
LEFT ATRIUM VOLUME INDEX: 21.6 ML/M2
LEFT ATRIUM VOLUME: 35.08 CM3
LEFT INTERNAL DIMENSION IN SYSTOLE: 2.43 CM (ref 2.1–4)
LEFT VENTRICLE DIASTOLIC VOLUME INDEX: 59.63 ML/M2
LEFT VENTRICLE DIASTOLIC VOLUME: 96.94 ML
LEFT VENTRICLE MASS INDEX: 68.2 G/M2
LEFT VENTRICLE SYSTOLIC VOLUME INDEX: 12.8 ML/M2
LEFT VENTRICLE SYSTOLIC VOLUME: 20.73 ML
LEFT VENTRICULAR INTERNAL DIMENSION IN DIASTOLE: 4.59 CM (ref 3.5–6)
LEFT VENTRICULAR MASS: 110.86 G
LV LATERAL E/E' RATIO: 15.29
LYMPHOCYTES # BLD AUTO: 1.3 K/UL
LYMPHOCYTES NFR BLD: 12.5 %
MAGNESIUM SERPL-MCNC: 2.1 MG/DL
MCH RBC QN AUTO: 21.3 PG
MCHC RBC AUTO-ENTMCNC: 29.7 G/DL
MCV RBC AUTO: 72 FL
MONOCYTES # BLD AUTO: 1.2 K/UL
MONOCYTES NFR BLD: 11.6 %
MV PEAK A VEL: 1.32 M/S
MV PEAK E VEL: 1.07 M/S
NEUTROPHILS # BLD AUTO: 7.2 K/UL
NEUTROPHILS NFR BLD: 73.1 %
PHOSPHATE SERPL-MCNC: 2.9 MG/DL
PISA TR MAX VEL: 2.82 M/S
PLATELET # BLD AUTO: 337 K/UL
PLATELET BLD QL SMEAR: ABNORMAL
PMV BLD AUTO: 10.4 FL
POIKILOCYTOSIS BLD QL SMEAR: SLIGHT
POLYCHROMASIA BLD QL SMEAR: ABNORMAL
POTASSIUM SERPL-SCNC: 3.4 MMOL/L
PROT SERPL-MCNC: 6.5 G/DL
PROTHROMBIN TIME: 10.9 SEC
PULM VEIN S/D RATIO: 1.39
PV PEAK D VEL: 0.41 M/S
PV PEAK S VEL: 0.57 M/S
PV PEAK VELOCITY: 0.77 CM/S
RA MAJOR: 4.7 CM
RA PRESSURE: 3 MMHG
RBC # BLD AUTO: 4.5 M/UL
RIGHT VENTRICULAR END-DIASTOLIC DIMENSION: 2.92 CM
SODIUM SERPL-SCNC: 138 MMOL/L
TDI LATERAL: 0.07
TR MAX PG: 31.81 MMHG
TV REST PULMONARY ARTERY PRESSURE: 35 MMHG
WBC # BLD AUTO: 9.99 K/UL

## 2019-01-28 PROCEDURE — 80053 COMPREHEN METABOLIC PANEL: CPT

## 2019-01-28 PROCEDURE — 25000003 PHARM REV CODE 250: Performed by: STUDENT IN AN ORGANIZED HEALTH CARE EDUCATION/TRAINING PROGRAM

## 2019-01-28 PROCEDURE — 84100 ASSAY OF PHOSPHORUS: CPT

## 2019-01-28 PROCEDURE — 97116 GAIT TRAINING THERAPY: CPT | Performed by: PHYSICAL THERAPIST

## 2019-01-28 PROCEDURE — 94761 N-INVAS EAR/PLS OXIMETRY MLT: CPT

## 2019-01-28 PROCEDURE — 83735 ASSAY OF MAGNESIUM: CPT

## 2019-01-28 PROCEDURE — 85730 THROMBOPLASTIN TIME PARTIAL: CPT

## 2019-01-28 PROCEDURE — 27000221 HC OXYGEN, UP TO 24 HOURS

## 2019-01-28 PROCEDURE — 85610 PROTHROMBIN TIME: CPT

## 2019-01-28 PROCEDURE — 99233 SBSQ HOSP IP/OBS HIGH 50: CPT | Mod: ,,, | Performed by: NURSE PRACTITIONER

## 2019-01-28 PROCEDURE — 63600175 PHARM REV CODE 636 W HCPCS: Performed by: STUDENT IN AN ORGANIZED HEALTH CARE EDUCATION/TRAINING PROGRAM

## 2019-01-28 PROCEDURE — 97165 OT EVAL LOW COMPLEX 30 MIN: CPT

## 2019-01-28 PROCEDURE — 99233 PR SUBSEQUENT HOSPITAL CARE,LEVL III: ICD-10-PCS | Mod: ,,, | Performed by: NURSE PRACTITIONER

## 2019-01-28 PROCEDURE — 85025 COMPLETE CBC W/AUTO DIFF WBC: CPT

## 2019-01-28 PROCEDURE — 97161 PT EVAL LOW COMPLEX 20 MIN: CPT | Performed by: PHYSICAL THERAPIST

## 2019-01-28 RX ORDER — HYDRALAZINE HYDROCHLORIDE 20 MG/ML
10 INJECTION INTRAMUSCULAR; INTRAVENOUS EVERY 6 HOURS PRN
Status: DISCONTINUED | OUTPATIENT
Start: 2019-01-28 | End: 2019-01-28 | Stop reason: HOSPADM

## 2019-01-28 RX ORDER — POTASSIUM CHLORIDE 20 MEQ/1
20 TABLET, EXTENDED RELEASE ORAL
Status: COMPLETED | OUTPATIENT
Start: 2019-01-28 | End: 2019-01-28

## 2019-01-28 RX ORDER — METOPROLOL TARTRATE 100 MG/1
100 TABLET ORAL 2 TIMES DAILY
Qty: 30 TABLET | Refills: 2 | Status: SHIPPED | OUTPATIENT
Start: 2019-01-28 | End: 2020-03-16

## 2019-01-28 RX ADMIN — ATORVASTATIN CALCIUM 20 MG: 20 TABLET, FILM COATED ORAL at 08:01

## 2019-01-28 RX ADMIN — Medication 1 TABLET: at 08:01

## 2019-01-28 RX ADMIN — FUROSEMIDE 40 MG: 40 TABLET ORAL at 08:01

## 2019-01-28 RX ADMIN — METOPROLOL TARTRATE 100 MG: 50 TABLET ORAL at 08:01

## 2019-01-28 RX ADMIN — HEPARIN SODIUM AND DEXTROSE 21 UNITS/KG/HR: 10000; 5 INJECTION INTRAVENOUS at 11:01

## 2019-01-28 RX ADMIN — HEPARIN SODIUM AND DEXTROSE 19 UNITS/KG/HR: 10000; 5 INJECTION INTRAVENOUS at 01:01

## 2019-01-28 RX ADMIN — POTASSIUM CHLORIDE 20 MEQ: 20 TABLET, EXTENDED RELEASE ORAL at 01:01

## 2019-01-28 RX ADMIN — FOLIC ACID 1 MG: 1 TABLET ORAL at 08:01

## 2019-01-28 RX ADMIN — POTASSIUM CHLORIDE 20 MEQ: 20 TABLET, EXTENDED RELEASE ORAL at 11:01

## 2019-01-28 RX ADMIN — FAMOTIDINE 20 MG: 20 TABLET ORAL at 08:01

## 2019-01-28 NOTE — NURSING
Patient d/c home d/c instructions given to the patient and her daughter. Patient and her daughter advised to start taking Iron and the two different doses of Xarelto.  She is aware there are two different mg of Xarelto and the two different start dates.  Which were delivered to the bedside by [facility].  Patient will keep all follow up appointments and schedule those that are needed.    Patient and her daughter  verbalized understanding of the D/C instructions. Education given, refer to clinical reference for education.   Waiting for transport

## 2019-01-28 NOTE — PROGRESS NOTES
VN note: VN cued into pt's room for introduction. VN informed pt that VN would be working closely along side bedside nurse, PCT, and the rest of care team and making rounds throughout the shift. Pt verbalized understanding. Allowed time for questions. Patient denies any pain or discomfort at this time.   Patient educated on safety to call before getting up, because we don't want the patient to fall and harm themselves in any way. [facility] number given per the patient request.  Family at the bedside VN will continue to be available to patient and intervene prn.        01/28/19 1010   Type of Frequent Check   Type Patient Rounds  (VN rounding)   Safety/Activity   Patient Rounds visualized patient;ID band on;call light in reach   Safety Promotion/Fall Prevention side rails raised x 2;family to remain at bedside   Safety Precautions emergency equipment at bedside   Positioning   Body Position supine   Head of Bed (HOB) HOB at 45 degrees   Pain/Comfort/Sleep   Preferred Pain Scale number (Numeric Rating Pain Scale)   Comfort/Acceptable Pain Level 2   Pain Rating (0-10): Rest 0   Pain Rating (0-10): Activity 0       Cardiac/Telemetry Details / Alarms   Cardiac/Telemetry Monitor On Yes   Cardiac/Telemetry Audible Yes   Cardiac/Telemetry Alarms Set Yes   Assessments (Pre/Post)   Level of Consciousness (AVPU) alert

## 2019-01-28 NOTE — PLAN OF CARE
01/28/19 1705   Final Note   Assessment Type Final Discharge Note   Anticipated Discharge Disposition Home   Hospital Follow Up  Appt(s) scheduled? Yes   Discharge plans and expectations educations in teach back method with documentation complete? Yes   Right Care Referral Info   Post Acute Recommendation No Care       Future Appointments   Date Time Provider Department Center   2/4/2019  1:00 PM Ethan Denton MD [facility]   2/19/2019  2:00 PM Mic Sinclair MD [facility] CARDIO [facility]   4/17/2019  2:30 PM Michael San MD [facility]       Valentina Cuevas, RN    [phone]

## 2019-01-28 NOTE — TELEPHONE ENCOUNTER
----- Message from Melissa Orellana RN sent at 1/28/2019  4:10 PM CST -----  Patient being discharged from [facility] today 1/28 with bilateral PE. followup made but couldn't get appt until April. Please see if you can see patient sooner.    Thanks,  Melisas Orellana, RN, [facility], CMSRN  RN Transition Navigator  [phone]

## 2019-01-28 NOTE — NURSING
Home Oxygen Evaluation    Date Performed: 2019    1) Patient's Home O2 Sat on room air, while at rest:             95    2) Patient's O2 Sat on room air while exercisin%        3)         na

## 2019-01-28 NOTE — PLAN OF CARE
Chief Complaint  84 y.o. female with Shortness of Breath    Pt independent with ADLs using assistive equipment. No HH, Has RW, BSC, WC, Grab Bars. Pt lives with adult Constance saldana and daughter's family, however, Son, Lyle Rivera is primary contact. Pt drives self to appts and errands. DaughterConstance can provide transpotation home on discharge    Pt denies any needs or concerns at this time. Discharge planning brochure and business card provided. CM numbers written on white board. Pt encouraged to call with any questions or needs. CM will continue to follow patient throughout the transitions of care, and assist with any discharge needs.       01/27/19 7182   Discharge Assessment   Assessment Type Discharge Planning Assessment   Confirmed/corrected address and phone number on facesheet? Yes   Assessment information obtained from? Patient;Caregiver  (pt and Daughter: Constance Chavez [phone])   Expected Length of Stay (days) 2   Communicated expected length of stay with patient/caregiver yes   Prior to hospitilization cognitive status: Alert/Oriented   Prior to hospitalization functional status: Independent   Current cognitive status: Alert/Oriented   Current Functional Status: Needs Assistance   Facility Arrived From: [facility] ED   Lives With child(alyse), adult  (Daughter: Constance Chavez [phone] and Constance's family)   Able to Return to Prior Arrangements yes   Is patient able to care for self after discharge? Unable to determine at this time (comments)   Who are your caregiver(s) and their phone number(s)? Daughter: Constance Chavez [phone], Son (primary Contact) Lyle Rivera [phone]   Patient's perception of discharge disposition home or selfcare   Readmission Within the Last 30 Days no previous admission in last 30 days   Patient currently being followed by outpatient case management? No   Patient currently receives any other outside agency services? No   Equipment Currently Used at Home  walker, rolling;wheelchair;bedside commode;grab bar   Do you have any problems affording any of your prescribed medications? No   Is the patient taking medications as prescribed? yes   Does the patient have transportation home? Yes   Transportation Anticipated family or friend will provide;car, drives self   Dialysis Name and Scheduled days N/A   Does the patient receive services at the Coumadin Clinic? No   Discharge Plan A Home with family   Discharge Plan B Home Health   DME Needed Upon Discharge  (TBD)   Patient/Family in Agreement with Plan yes     Nila Ramirez RN Transitional Navigator  [phone]

## 2019-01-28 NOTE — ASSESSMENT & PLAN NOTE
- submassive per criteria; hemodynamically stable  -placed on IV Heparin gtt with no thrombolytic therapy due to H&H upon admission  -responding well to IV Heparin with improvement in SOB per patient; remains on 2LPM NC; will check ambulatory pulse ox today  -TTE today  -previously treated with DOACs (Eliquis and Xarelto) for atrial flutter with self discontinuation due to financial constraints  -needs chronic AC with either DOACs or Coumadin; if Coumadin decided needs to be bridged with either SQ Lovenox or IV Heparin until INR greater than 2.0

## 2019-01-28 NOTE — DISCHARGE SUMMARY
[facility]  Discharge Summary      Patient Name: Katarina Rivera  MRN: [id number]  Admission Date: 1/25/2019  Hospital Length of Stay: 3 days  Discharge Date and Time: 1/28/2019  6:24 PM  Attending Physician: Severyn Yaroshevsky, MD   Discharging Provider: Jacey Aiken MD  Primary Care Provider: Ethan Denton MD     HPI:  The patient w/ atrial flutter, UC, CHF w/ DD and HLD presented with SOB and weakness after coming home from a 12[place]hr [place] in [place] a few days ago. At home she is ambulatory and uses no home O2. She denies any bleeding on any part of her body, no dark stool. She is not taking her UC med daily. She denied ha, cp, sob, abd/urinary complaints, n/v/d/c, f/c.    Hospital Course: In the ED, she was found to have bilateral submassive PE and elevated troponins with no ECG changes, and low H/H. She was admitted and put on heparin gtt and given 2 units of PRBCs. Over the next day the H/H stabilized and she improved clinically. On 1/27 patient was switched to Xarelto for ease of compliance.   She passed her ambulatory O2 test and required 2L O2 NC. On 1/28 patient's echo was repeated.  She was seen by GI just to be sure that no intervention was needed for potential GI bleed now that she will be on anticoagulation.  Gi said no further investigation was warranted.    Due to physical exam findings, consider work-up for neurofibromatosis? Pt states she has been significantly hoarse for 40 years, approx the same time frame as her tremor.  She is unclear about her family history in this regard.      Consults:   Consults (From admission, onward)        Status Ordering Provider     Inpatient consult to Cardiology-[facility]  Once     Provider:  Mic Sinclair MD    Completed PARI CALLAHAN     Inpatient consult to Gastroenterology-[facility]  Once     Provider:  (Not yet assigned)    Completed JACEY AIKEN     IP consult to case management  Once    "  Provider:  (Not yet assigned)    Acknowledged YAROSHEVSKY, SEVERYN          Significant Diagnostic Studies:     CTA: Extensive bilateral pulmonary thromboemboli involving the bilateral lobar pulmonary arteries extending into segmental and subsegmental branches.  There is evidence for right heart strain.    Indeterminate left adrenal gland nodule.  Further evaluation with a dedicated CT scan of the abdomen, adrenal mass protocol, is recommended for further evaluation after resolution of acute symptoms.  Alternatively, if prior imaging is available, it could be submitted for comparison purposes.    ECHO:     Pending Diagnostic Studies:     None        Final Active Diagnoses:    Diagnosis Date Noted POA    PRINCIPAL PROBLEM:  Bilateral pulmonary embolism [I26.99] 01/25/2019 Yes    Acute deep vein thrombosis (DVT) of distal vein of both lower extremities [I82.4Z3] 01/28/2019 Yes    Chest pain [R07.9] 01/25/2019 Yes    Diastolic dysfunction [I51.9] 07/29/2016 Yes    Atrial flutter [I48.92] 07/25/2016 Yes    NSTEMI (non-ST elevated myocardial infarction) [I21.4] 07/23/2016 Yes    Ulcerative colitis [K51.90] 07/26/2012 Yes    Osteoarthritis [M19.90] 07/26/2012 Yes      Problems Resolved During this Admission:      Discharged Condition: stable    Disposition: Home with self    Follow Up:    Patient Instructions:      OXYGEN FOR HOME USE     Order Specific Question Answer Comments   Liter Flow 2    Duration Continuous    Qualifying SpO2: 87    Testing done at: [facility]    Device home concentrator with portable unit    Length of need (in months): 99 mos    Patient condition with qualifying saturation CHF    Height: 5' 3" (1.6 m)    Weight: 60.3 kg (132 lb 15 oz)    Does patient have medical equipment at home? walker, standard    Alternative treatment measures have been tried or considered and deemed clinically ineffective. Yes      Medications:  Reconciled Home Medications:      Medication List      START taking these " medications    IRON 100 PLUS Tab  Generic drug:  iron-vit c-b12-folic acid  Take 1 tablet by mouth once daily.  Start taking on:  1/29/2019     * XARELTO 15 mg (42)- 20 mg (9) tablet dose pack  Generic drug:  rivaroxaban  Take 1 tablet (15 mg total) by mouth 2 (two) times daily with meals. for 21 days, then take one 20 mg tablet by mouth everyday thereafter     * XARELTO 20 mg Tab  Generic drug:  rivaroxaban  Take 1 tablet (20 mg total) by mouth daily with dinner or evening meal.  Start taking on:  2/18/2019         * This list has 2 medication(s) that are the same as other medications prescribed for you. Read the directions carefully, and ask your doctor or other care provider to review them with you.            CONTINUE taking these medications    atorvastatin 20 MG tablet  Commonly known as:  LIPITOR  Take 20 mg by mouth once daily.     furosemide 40 MG tablet  Commonly known as:  LASIX  Take 1 tablet (40 mg total) by mouth once daily.     latanoprost 0.005 % ophthalmic solution  Place 1 drop into both eyes every evening.     metoprolol tartrate 100 MG tablet  Commonly known as:  LOPRESSOR  Take 1 tablet (100 mg total) by mouth 2 (two) times daily.     nitroGLYCERIN 0.4 MG SL tablet  Commonly known as:  NITROSTAT  Place 1 tablet (0.4 mg total) under the tongue every 5 (five) minutes as needed for Chest pain.     sulfaSALAzine 500 mg Tab  Commonly known as:  AZULFIDINE  Take 1 tablet (500 mg total) by mouth 2 (two) times daily.            Jr Aiken MD  Family Medicine  [facility]

## 2019-01-28 NOTE — SUBJECTIVE & OBJECTIVE
Review of Systems   Constitution: Negative for chills, decreased appetite, diaphoresis, fever and weakness.   Cardiovascular: Positive for dyspnea on exertion. Negative for chest pain, claudication, cyanosis, irregular heartbeat, leg swelling, near-syncope, orthopnea, palpitations, paroxysmal nocturnal dyspnea and syncope.   Respiratory: Negative for cough, hemoptysis, shortness of breath and wheezing.    Gastrointestinal: Negative for bloating, abdominal pain, constipation, diarrhea, melena, nausea and vomiting.   Neurological: Negative for dizziness.     Objective:     Vital Signs (Most Recent):  Temp: 99.3 °F (37.4 °C) (01/28/19 0744)  Pulse: 69 (01/28/19 0744)  Resp: (!) 22 (01/28/19 0744)  BP: (!) 153/67 (01/28/19 0744)  SpO2: 96 % (01/28/19 0744) Vital Signs (24h Range):  Temp:  [96.8 °F (36 °C)-99.3 °F (37.4 °C)] 99.3 °F (37.4 °C)  Pulse:  [65-73] 69  Resp:  [16-22] 22  SpO2:  [90 %-96 %] 96 %  BP: (105-153)/(56-67) 153/67     Weight: 60.3 kg (132 lb 15 oz)  Body mass index is 23.55 kg/m².     SpO2: 96 %  O2 Device (Oxygen Therapy): nasal cannula      Intake/Output Summary (Last 24 hours) at 1/28/2019 1051  Last data filed at 1/28/2019 0800  Gross per 24 hour   Intake 578.19 ml   Output 700 ml   Net -121.81 ml       Lines/Drains/Airways     Peripheral Intravenous Line                 Peripheral IV - Single Lumen 01/25/19 0549 Left Antecubital 3 days         Peripheral IV - Single Lumen 01/26/19 1907 Right Hand 1 day                Physical Exam   Constitutional: She is oriented to person, place, and time. She appears well-developed and well-nourished. No distress.   Cardiovascular: Normal rate and regular rhythm. Exam reveals no gallop.   No murmur heard.  Pulmonary/Chest: Effort normal and breath sounds normal. No respiratory distress. She has no wheezes.   Abdominal: Soft. Bowel sounds are normal. She exhibits no distension. There is no tenderness.   Neurological: She is alert and oriented to person,  place, and time.   Skin: Skin is warm and dry.       Significant Labs:     Recent Labs   Lab 01/28/19  0620      K 3.4*      CO2 23   BUN 25*   CREATININE 1.2   MG 2.1     Recent Labs   Lab 01/28/19  0620   WBC 9.99   RBC 4.50   HGB 9.6*   HCT 32.3*      MCV 72*   MCH 21.3*   MCHC 29.7*       Significant Imaging:     TTE pending today

## 2019-01-28 NOTE — PLAN OF CARE
Problem: Physical Therapy Goal  Goal: Physical Therapy Goal  1.  Sit in chair 2 hours  2.  Ambulate 250' with Mod Ind without AD  3.  Bed to/from chair with Mod Ind.  4.  Ind HEP  Outcome: Ongoing (interventions implemented as appropriate)  Pt would benefit from skilled PT to progress functional mobility and endurance.

## 2019-01-28 NOTE — PT/OT/SLP EVAL
Physical Therapy Evaluation    Patient Name:  Katarina Rivera   MRN:  [id number]    Recommendations:     Discharge Recommendations:    Home with family  Discharge Equipment Recommendations: none   Barriers to discharge: None    Assessment:     Katarina Rivera is a 84 y.o. female admitted with a medical diagnosis of Bilateral pulmonary embolism.  She presents with the following impairments/functional limitations:  weakness, impaired functional mobilty, impaired balance, impaired cardiopulmonary response to activity, impaired endurance, decreased lower extremity function.  Pt ambulated 90' with CG without AD with slight ataxic gait without LOB, reaching for objects when in room.     Rehab Prognosis: Good; patient would benefit from acute skilled PT services to address these deficits and reach maximum level of function.    Recent Surgery: * No surgery found *      Plan:     During this hospitalization, patient to be seen 5 x/week to address the identified rehab impairments via gait training, therapeutic activities, therapeutic groups, neuromuscular re-education and progress toward the following goals:    · Plan of Care Expires:  02/28/19    Subjective     Chief Complaint: none  Patient/Family Comments/goals: go home able to walk around like before  Pain/Comfort:  · Pain Rating 1: 0/10    Patients cultural, spiritual, Druze conflicts given the current situation: no    Living Environment:  Pt lives in a [facility] with no steps with her daughter.  Prior to admission, patients level of function was Ind ambulation community level with no incidence of falling in last 3 months.  Equipment used at home: grab bar, bath bench.  DME owned (not currently used): rolling walker, bedside commode and wheelchair.  Upon discharge, patient will have assistance from her daughter.    Objective:     Communicated with nurse prior to session.  Patient found all lines intact, call button in reach and bed alarm on peripheral IV, telemetry, oxygen  upon  PT entry to room.    General Precautions: Standard, fall   Orthopedic Precautions:    Braces:       Exams:  · Pt is oriented x 4.  Pt has BLE ROM WFL.  Pt has 4-5/5 BLE strength.    Functional Mobility:  · Bed Mobility:     · Supine to Sit: supervision  · Transfers:     · Sit to Stand:  contact guard assistance with no AD  · Gait:  Pt ambulated 90' with CG without AD with slight ataxic gait without LOB, reaching for objects when in room.   · Balance: Pt has F dynamic standing balance      Therapeutic Activities and Exercises:   Pt sat at EOB 13 min total.  Pt in chair post ambulation with O2 Sat of 94 post ambulation without O2 on per nursing request.    AM-PAC 6 CLICK MOBILITY  Total Score:21     Patient left up in chair with all lines intact, call button in reach, chair alarm on, nursing notified and O2 off of patient at nurses request secondary to O2 Sat at 94 post ambulation without O2. .    GOALS:   Multidisciplinary Problems     Physical Therapy Goals        Problem: Physical Therapy Goal    Goal Priority Disciplines Outcome Goal Variances Interventions   Physical Therapy Goal     PT, PT/OT Ongoing (interventions implemented as appropriate)     Description:  1.  Sit in chair 2 hours  2.  Ambulate 250' with Mod Ind without AD  3.  Bed to/from chair with Mod Ind.  4.  Ind HEP                    History:     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        Past Surgical History:   Procedure Laterality Date    APPENDECTOMY      COLONOSCOPY      COLONOSCOPY N/A 11/10/2015    Performed by [facility] ([facility] F[facility])    COLONOSCOPY N/A 6/12/2014    Performed by Michael San [facility] at [facility] (4TH FLR)    COLONOSCOPY N/A 3/14/2013    Per[facility] by Michael San [facility] at [facility] (4TH FLR)    hemorrhoidectomy      HYSTERECTOMY      OOPHORECTOMY      TOTAL KNEE ARTHROPLASTY      left        Clinical  Decision Making:     History  Co-morbidities and personal factors that may impact the plan of care Examination  Body Structures and Functions, activity limitations and participation restrictions that may impact the plan of care Clinical Presentation   Decision Making/ Complexity Score   Co-morbidities:   [] Time since onset of injury / illness / exacerbation  [x] Status of current condition  []Patient's cognitive status and safety concerns    [] Multiple Medical Problems (see med hx)  Personal Factors:   [] Patient's age  [x] Prior Level of function   [] Patient's home situation (environment and family support)  [] Patient's level of motivation  [] Expected progression of patient      HISTORY:(criteria)    [] 69969 - no personal factors/history    [x] 00433 - has 1-2 personal factor/comorbidity     [x] 90453 - has >3 personal factor/comorbidity     Body Regions:  [] Objective examination findings  [] Head     []  Neck  [] Trunk   [] Upper Extremity  [x] Lower Extremity    Body Systems:  [] For communication ability, affect, cognition, language, and learning style: the assessment of the ability to make needs known, consciousness, orientation (person, place, and time), expected emotional /behavioral responses, and learning preferences (eg, learning barriers, education  needs)  [] For the neuromuscular system: a general assessment of gross coordinated movement (eg, balance, gait, locomotion, transfers, and transitions) and motor function  (motor control and motor learning)  [] For the musculoskeletal system: the assessment of gross symmetry, gross range of motion, gross strength, height, and weight  [] For the integumentary system: the assessment of pliability(texture), presence of scar formation, skin color, and skin integrity  [] For cardiovascular/pulmonary system: the assessment of heart rate, respiratory rate, blood pressure, and edema     Activity limitations:    [] Patient's cognitive status and saf ety concerns           [x] Status of current condition      [] Weight bearing restriction  [] Cardiopulmunary Restriction    Participation Restrictions:   [] Goals and goal agreement with the patient     [] Rehab potential (prognosis) and probable outcome      Examination of Body System: (criteria)    [] 14497 - addressing 1-2 elements    [x] 48380 - addressing a total of 3 or more elements     [] 28264 -  Addressing a total of 4 or more elements         Clinical Presentation: (criteria)  Stable - 22336     On examination of body system using standardized tests and measures patient presents with 1-2 elements from any of the following: body structures and functions, activity limitations, and/or participation restrictions.  Leading to a clinical presentation that is considered stable and/or uncomplicated                              Clinical Decision Making  (Eval Complexity):  Low- 92622     Time Tracking:     PT Received On: 01/28/19  PT Start Time: 1221     PT Stop Time: 1251  PT Total Time (min): 30 min     Billable Minutes:Evaluation 15 Gait 15      Layla Vega, PT  01/28/2019

## 2019-01-28 NOTE — ASSESSMENT & PLAN NOTE
-BLE venous ultrasound with DVT to right posterior tibial vein, right peroneal vein, and bilateral popliteal vein.  -currently on IV Heparin with Coumadin bridge in process

## 2019-01-28 NOTE — PROGRESS NOTES
[facility]  Cardiology  Progress Note    Patient Name: Katarina Rivera  MRN: [id number]  Admission Date: 1/25/2019  Hospital Length of Stay: 3 days  Code Status: Full Code   Attending Physician: Severyn Yaroshevsky, MD   Primary Care Physician: Ethan Denton MD  Expected Discharge Date:   Principal Problem:Bilateral pulmonary embolism    Subjective:     Hospital Course:   1/27/2019 Cardiology consulted due to PE. Seen by Dr. Sinclair with following plan '    Pulmonary Embolism - submassive per criteria and hemodynamically stable. Doing well on heparin gtt and H/H stable. Does not want coumadin ideally, and thinks she can afford Xarelto. Need to clarify this and determine which. If coumadin, needs bridge before D/C.      Anemia - unclear etiology and w/u underway. Recommend GIB w/u also. H/H slightly lower today - continue to monitor  Overnight no acute events. Coumadin started yesterday. Pt states she thinks she can afford Xarelto. Hemodynamically stable and symptoms improved. H/H slightly lower today.   1/28/2019 Remains on IV Heparin drip along with Coumadin bridge in process. INR 1.1 this AM. Reports SOB improved since admission remains on 2LPM of oxygen. CBC and BMP WNL. HR and BP stable             Review of Systems   Constitution: Negative for chills, decreased appetite, diaphoresis, fever and weakness.   Cardiovascular: Positive for dyspnea on exertion. Negative for chest pain, claudication, cyanosis, irregular heartbeat, leg swelling, near-syncope, orthopnea, palpitations, paroxysmal nocturnal dyspnea and syncope.   Respiratory: Negative for cough, hemoptysis, shortness of breath and wheezing.    Gastrointestinal: Negative for bloating, abdominal pain, constipation, diarrhea, melena, nausea and vomiting.   Neurological: Negative for dizziness.     Objective:     Vital Signs (Most Recent):  Temp: 99.3 °F (37.4 °C) (01/28/19 0744)  Pulse: 69 (01/28/19 0744)  Resp: (!) 22 (01/28/19 0744)  BP:  (!) 153/67 (01/28/19 0744)  SpO2: 96 % (01/28/19 0744) Vital Signs (24h Range):  Temp:  [96.8 °F (36 °C)-99.3 °F (37.4 °C)] 99.3 °F (37.4 °C)  Pulse:  [65-73] 69  Resp:  [16-22] 22  SpO2:  [90 %-96 %] 96 %  BP: (105-153)/(56-67) 153/67     Weight: 60.3 kg (132 lb 15 oz)  Body mass index is 23.55 kg/m².     SpO2: 96 %  O2 Device (Oxygen Therapy): nasal cannula      Intake/Output Summary (Last 24 hours) at 1/28/2019 1051  Last data filed at 1/28/2019 0800  Gross per 24 hour   Intake 578.19 ml   Output 700 ml   Net -121.81 ml       Lines/Drains/Airways     Peripheral Intravenous Line                 Peripheral IV - Single Lumen 01/25/19 0549 Left Antecubital 3 days         Peripheral IV - Single Lumen 01/26/19 1907 Right Hand 1 day                Physical Exam   Constitutional: She is oriented to person, place, and time. She appears well-developed and well-nourished. No distress.   Cardiovascular: Normal rate and regular rhythm. Exam reveals no gallop.   No murmur heard.  Pulmonary/Chest: Effort normal and breath sounds normal. No respiratory distress. She has no wheezes.   Abdominal: Soft. Bowel sounds are normal. She exhibits no distension. There is no tenderness.   Neurological: She is alert and oriented to person, place, and time.   Skin: Skin is warm and dry.       Significant Labs:     Recent Labs   Lab 01/28/19  0620      K 3.4*      CO2 23   BUN 25*   CREATININE 1.2   MG 2.1     Recent Labs   Lab 01/28/19  0620   WBC 9.99   RBC 4.50   HGB 9.6*   HCT 32.3*      MCV 72*   MCH 21.3*   MCHC 29.7*       Significant Imaging:     TTE pending today     Assessment and Plan:     Brief HPI: Seen on AM NP rounds with daughter at the bedside. Reports SOB improved since admission. Discussed cardiac POC with patient and daughter as detailed below-verbalized understanding and agrees with POC     * Bilateral pulmonary embolism    - submassive per criteria; hemodynamically stable  -placed on IV Heparin gtt  with no thrombolytic therapy due to H&H upon admission  -responding well to IV Heparin with improvement in SOB per patient; remains on 2LPM NC; will check ambulatory pulse ox today  -TTE today  -previously treated with DOACs (Eliquis and Xarelto) for atrial flutter with self discontinuation due to financial constraints  -needs chronic AC with either DOACs or Coumadin; if Coumadin decided needs to be bridged with either SQ Lovenox or IV Heparin until INR greater than 2.0      Acute deep vein thrombosis (DVT) of distal vein of both lower extremities    -BLE venous ultrasound with DVT to right posterior tibial vein, right peroneal vein, and bilateral popliteal vein.  -currently on IV Heparin with Coumadin bridge in process      Atrial flutter    -currently NSR  -continue Metoprolol as well as AC for DVT/PE and stroke prevention in setting of history of atrial flutter      NSTEMI (non-ST elevated myocardial infarction)    -related to demand etiology in setting of PE  -troponin peaked at .19  -since NSTEMI demand related no need for Plavix and due to bleeding risk with AC use  -continue statin, BB; recommend ASA; no ACEI/ARB for now due to marginal BP          VTE Risk Mitigation (From admission, onward)        Ordered     warfarin (COUMADIN) tablet 5 mg  Daily      01/27/19 0642     heparin 25,000 units in dextrose 5% (100 units/ml) IV bolus from bag INITIAL BOLUS  Once      01/25/19 2117     heparin 25,000 units in dextrose 5% 250 mL (100 units/mL) infusion HIGH INTENSITY nomogram - [facility]  Continuous      01/25/19 2117     heparin 25,000 units in dextrose 5% (100 units/ml) IV bolus from bag - ADDITIONAL PRN BOLUS - 60 units/kg  As needed (PRN)      01/25/19 2117     heparin 25,000 units in dextrose 5% (100 units/ml) IV bolus from bag - ADDITIONAL PRN BOLUS - 30 units/kg  As needed (PRN)      01/25/19 2117     IP VTE HIGH RISK PATIENT  Once      01/25/19 2108          Savannah Dawson, APRN, ANP  Cardiology  [facility]

## 2019-01-28 NOTE — PLAN OF CARE
Problem: Adult Inpatient Plan of Care  Goal: Plan of Care Review  Outcome: Ongoing (interventions implemented as appropriate)  Patient had  an unevenful night. Nurse went over plan of care with patient and daughter, questions encouraged.  Heparin currently infusing at 19 u/kg/hr. On continuous heart monitoring, NSR,  no true red alarms . Fall precautions maintained.  Bed locked and low, side rails X3, call bell within reach.

## 2019-01-28 NOTE — ASSESSMENT & PLAN NOTE
-currently NSR  -continue Metoprolol as well as AC for DVT/PE and stroke prevention in setting of history of atrial flutter

## 2019-01-28 NOTE — ASSESSMENT & PLAN NOTE
-related to demand etiology in setting of PE  -troponin peaked at .19  -since NSTEMI demand related no need for Plavix and due to bleeding risk with AC use  -continue statin, BB; recommend ASA; no ACEI/ARB for now due to marginal BP

## 2019-01-28 NOTE — HOSPITAL COURSE
1/27/2019 Cardiology consulted due to PE. Seen by Dr. Sinclair with following plan '    Pulmonary Embolism - submassive per criteria and hemodynamically stable. Doing well on heparin gtt and H/H stable. Does not want coumadin ideally, and thinks she can afford Xarelto. Need to clarify this and determine which. If coumadin, needs bridge before D/C.      Anemia - unclear etiology and w/u underway. Recommend GIB w/u also. H/H slightly lower today - continue to monitor  Overnight no acute events. Coumadin started yesterday. Pt states she thinks she can afford Xarelto. Hemodynamically stable and symptoms improved. H/H slightly lower today.   1/28/2019 Remains on IV Heparin drip along with Coumadin bridge in process. INR 1.1 this AM. Reports SOB improved since admission remains on 2LPM of oxygen. CBC and BMP WNL. HR and BP stable

## 2019-01-28 NOTE — PLAN OF CARE
01/28/19 1510   Medicare Message   Important Message from Medicare regarding Discharge Appeal Rights Given to patient/caregiver;Explained to patient/caregiver;Signed/date by patient/caregiver   Date IMM was signed 01/28/19   Time IMM was signed 1300

## 2019-01-28 NOTE — PT/OT/SLP EVAL
Occupational Therapy   Evaluation and Discharge Note    Name: Katarina Rivera  MRN: [id number]  Admitting Diagnosis:  Bilateral pulmonary embolism      Recommendations:     Discharge Recommendations: other (see comments)(home)  Discharge Equipment Recommendations:  none  Barriers to discharge:  None    Assessment:     Katarina Rivera is a 84 y.o. female with a medical diagnosis of Bilateral pulmonary embolism. At this time, patient is functioning at their prior level of function and does not require further acute OT services.     Plan:     During this hospitalization, patient does not require further acute OT services.  Please re-consult if situation changes.    · Plan of Care Reviewed with: patient, daughter    Subjective     Chief Complaint: SOB  Patient/Family Comments/goals: get back home    Occupational Profile:  Living Environment: Lives w/dtr(w/bipolar) in SSH, no CODY; sits for bath  Previous level of function: Mod I-indep  Roles and Routines: drives, performs IADLs  Equipment Used at home:  bath bench, grab bar  Assistance upon Discharge: family    Pain/Comfort:  · Pain Rating 1: 0/10  · Pain Rating Post-Intervention 1: 0/10    Patients cultural, spiritual, Muslim conflicts given the current situation: no    Objective:     Communicated with: nurse prior to session.  Patient found up in chair with peripheral IV, telemetry, oxygen upon OT entry to room.    General Precautions: Standard, fall   Orthopedic Precautions:    Braces:       Occupational Performance:    Bed Mobility:    · Patient completed Rolling/Turning to Right with modified independence  · Patient completed Scooting/Bridging with modified independence  · Patient completed Supine to Sit with modified independence    Functional Mobility/Transfers:  · Patient completed Sit <> Stand Transfer with modified independence  with  no assistive device   · Patient completed Bed <> Chair Transfer using Step Transfer technique with modified independence with no  "assistive device  · Patient completed Toilet Transfer Step Transfer technique with modified independence with  no AD  · Functional Mobility: Mod I in room without AD    Activities of Daily Living:  · Grooming: modified independence  2  · Lower Body Dressing: modified independence    · Toileting: modified independence      Cognitive/Visual Perceptual:  AO4  Decreased safety awareness    Physical Exam:  BUE AROM/stength WFL  Good sit balance, fair+/good stand balance    AMPAC 6 Click ADL:  AMPAC Total Score: 24    Treatment & Education:  Eval completed; left for test.  Pt with decreased safety awareness  Education:    Patient left up in chair with all lines intact, call button in reach and transporter present    GOALS:   Multidisciplinary Problems     Occupational Therapy Goals     Not on file          Multidisciplinary Problems (Resolved)        Problem: Occupational Therapy Goal    Goal Priority Disciplines Outcome Interventions   Occupational Therapy Goal   (Resolved)     OT, PT/OT Outcome(s) achieved                    History:     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  OT Low:  "Pt evaluation falls under low complexity for evaluation coding due to performance deficits noted in 1-3 areas as stated above and 0 co-morbities affecting current functional status. Data obtained from problem focused assessments. No modifications " "or assistance was required for completion of evaluation. Only brief occupational profile and history review completed."     Time Tracking:     OT Date of Treatment: 01/28/19  OT Start Time: 1030  OT Stop Time: 1045  OT Total Time (min): 15 min    Billable Minutes:Evaluation 15    Biju Sosa OT  1/28/2019    "

## 2019-01-28 NOTE — NURSING
Home Oxygen Evaluation    Date Performed: 1/28/2019    1) Patient's Home O2 Sat on room air, while at rest: 87%      2) Patient's O2 Sat on room air while exercising: NA          3) Patient's O2 Sat while exercising on O2: 95% 2 L nc

## 2019-01-28 NOTE — PLAN OF CARE
Problem: Occupational Therapy Goal  Goal: Occupational Therapy Goal  Outcome: Outcome(s) achieved Date Met: 01/28/19  OT eval performed, report to follow    Pt appears to be at baseline function; no acute OT intervention warranted; discontinue OT services

## 2019-01-28 NOTE — PROGRESS NOTES
.Pharmacy New Medication Education    Patient accepted medication education.    Pharmacy educated patient on name and purpose of medications and possible side effects, using the teach-back method.     Current Inpatient Medication Orders   atorvastatin tablet 20 mg   famotidine tablet 20 mg   folic acid tablet 1 mg   furosemide tablet 40 mg   heparin 25,000 units in dextrose 5% (100 units/ml) IV bolus from bag - ADDITIONAL PRN BOLUS - 30 units/kg   heparin 25,000 units in dextrose 5% (100 units/ml) IV bolus from bag - ADDITIONAL PRN BOLUS - 60 units/kg   heparin 25,000 units in dextrose 5% (100 units/ml) IV bolus from bag INITIAL BOLUS   heparin 25,000 units in dextrose 5% 250 mL (100 units/mL) infusion HIGH INTENSITY nomogram - [facility]   iron-vit c-b12-folic acid (I-Car-C Plus) [id number] mg-mg-mcg-mg tablet 1 tablet   latanoprost 0.005 % ophthalmic solution 1 drop   metoprolol tartrate (LOPRESSOR) tablet 100 mg   ramelteon tablet 8 mg   sodium chloride 0.9% flush 3 mL   warfarin (COUMADIN) tablet 5 mg       Learners of pharmacy medication education included:  Patient    Patient +/- learner response:  Verbalized Understanding, Teachback

## 2019-01-28 NOTE — PLAN OF CARE
Problem: Adult Inpatient Plan of Care  Goal: Plan of Care Review  Outcome: Ongoing (interventions implemented as appropriate)  Plan of care reviewed with patient and daughter. Voices understanding. NSR on monitor with no red alarms noted. Heparin infusing at this time. Next PTT in am. Weaned to RA spo2 89-91% after ambulating short distance. Afshan RN aware and will continue to monitor. Side rails x3, bed low, call bell within reach. Maintain bed alarm for patient safety. Patient will be monitored overnight.

## 2019-01-28 NOTE — CONSULTS
[facility]    CC: anemia    HPI 84 y.o. female with chronic, moderate, microcytic, iron deficient anemia not associated with overt GI bleeding. She has a history of ulcerative colitis for which she follows with GI doctor Michael San and last had a colonoscopy 2 years ago which she said was ok. They had recommended she have her colon removed, but she declined due to a friend who had this in the past and had so much pain after the surgery that she committed suicide. After her friend's death, the patient decided she would not ever go through with colectomy.     She is currently hospitalized due to submassive bilateral PE and DVT. She is currently on a heparin drip and transitioning to coumadin. In the past xarelto has been cost prohibitive. Her primary team is attempting to get xarelto covered. While she has been on heparin, she has not had any melena or blood in her stools. Her hemoglobin was low at 7.9 on arrival but has been stable after transfusion. She has a low ferritin and tells me that she is not on any kind of iron supplement at home.     Collateral information obtained from nurse and daughter at bedside.       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          Past Surgical History:   Procedure Laterality Date    APPENDECTOMY      COLONOSCOPY      COLONOSCOPY N/A 11/10/2015    [facility]formed [facility] (4TH FLR)    COLONOSCOPY N/A 6/12/2014    Performed by [facility] at [facility] END[facility] (4TH FLR)    COLONOSCOPY N/A 3/14/2013    Performed by [facility] at [facility] END[facility] (4TH FLR)    hemorrhoidectomy      HYSTERECTOMY      OOPHORECTOMY      TOTAL KNEE ARTHROPLASTY      left        Social History  Social History     Tobacco Use    Smoking status: Never Smoker    Smokeless tobacco: Never Used   Substance Use Topics    Alcohol use: No    Drug use: No     Family History  "  Problem Relation Age of Onset    Stroke Mother     Blindness Father     Colon cancer Neg Hx     Ulcerative colitis Neg Hx    No family history of colon cancer     Review of Systems  General ROS: negative for chills, fever or weight loss  Psychological ROS: negative for hallucination, depression or suicidal ideation  Ophthalmic ROS: negative for blurry vision, photophobia or eye pain  ENT ROS: negative for epistaxis, sore throat or rhinorrhea  Respiratory ROS: no cough. Positive SOB and RUTLEDGE. Positive respiratory distress requiring supplemental oxygen   Cardiovascular ROS: no chest pain or dyspnea on exertion  Gastrointestinal ROS: no abdominal pain, change in bowel habits. No melena or bloody stools.   Genito-Urinary ROS: no dysuria, trouble voiding, or hematuria  Musculoskeletal ROS: negative for gait disturbance or muscular weakness  Neurological ROS: no syncope or seizures; no ataxia  Dermatological ROS: negative for pruritis, rash and jaundice    Physical Examination  BP (!) 153/67 (BP Location: Right arm, Patient Position: Lying)   Pulse 70   Temp 99.3 °F (37.4 °C) (Oral)   Resp (!) 22   Ht 5' 3" (1.6 m)   Wt 60.3 kg (132 lb 15 oz)   SpO2 96%   Breastfeeding? No   BMI 23.55 kg/m²      General appearance: alert, cooperative, no distress, daughter at bedside   HENT: Normocephalic, atraumatic, neck symmetrical, no nasal discharge   Eyes: conjunctivae/corneas clear, PERRL, EOM's intact  Lungs: nasal canula in use, diminished breath sounds in bases, labored breathing when trying to sit up. Pauses in between sentences to breathe.   Heart: regular rate and rhythm without rub; no displacement of the PMI   Abdomen: soft, non-tender; bowel sounds normoactive; no organomegaly  Extremities: extremities symmetric; no clubbing, cyanosis, or edema  Integument: Skin color, texture, turgor normal; no rashes; hair distrubution normal. Easy bruising of bilateral arms   Neurologic: Alert and oriented X 3, moving all " extremities, sensation equal and symmetric   Psychiatric: no pressured speech; normal affect; no evidence of impaired cognition     Labs:  H/H 9.6/32.3, Hb was 7.9 on 1/25  Ferritin 13    Imaging:  No new imaging in last 24 hours  All imaging from this admission personally reviewed     Assessment:   Ms. Rivera is an 84 year old woman with a PMH of ulcerative colitis who gets colonoscopies every 2 years. She has no symptoms of GI bleeding and denies melena and hematochezia. She is currently hospitalized for PE and DVT requiring anticoagulation.     Plan:   - given ferritin 13, would start po iron supplement  - no restriction to starting anticoagulation for PE and DVT from GI standpoint  - if she has any issues with GI bleeding can re-assess. Discussed what to look for if she has any bleeding with the patient and her daughter  - primary team trying to obtain xarelto which has been expensive in past. She has also not been able to afford eliquis though it was several years ago. Primary team can consider asking pharmacy to see if the patient assistance program for eliquis can help her get her NOAC at a low cost. It the past the assistance program has provided substantial financial assistance allowing patients to get it very low cost     Thank you for this consult. Please call with any questions.     Jessica Nunez, DO  [facility]  01/28/2019  12:20 PM

## 2019-01-28 NOTE — PROGRESS NOTES
[facility] Hospital Medicine  Progress Note    Patient Name: Katarina Rivera  MRN: [id number]  Patient Class: IP- Inpatient   Admission Date: 1/25/2019  Length of Stay: 3 days  Attending Physician: Severyn Yaroshevsky, MD  Primary Care Provider: Ethan Denton MD        Subjective:     Principal Problem:Bilateral pulmonary embolism    HPI: The patient w/ pmhx of atrial flutter, UC, CHF w/ DD and HLD presents with sob and weakness after at trip to the [facility] in [facility] a few days ago. She took the bus and said it took about 12 hours, after she got home the symtoms started. At home she is ambulatory, pap smear and colonoscopy all uptodate and all normal, no hx of malignancy or blood disorder. She denies any bleeding on any part of her body, no dark stool. She is not taking her UC med daily. She denies ha, cp, sob, abd/urinary complaints, n/v/d/c, f/c.        Hospital Course:     Still needing NC.  No pain.  Hungry. No chest pain.  Toelrating PO. Urinating well.  Cardiology following.      Review of Systems   She denies any bleeding, ha, cp, sob, urinary/bowel complaints, f/c, n/v/d/c.    Objective:     Vital Signs (Most Recent):  Temp: 99.3 °F (37.4 °C) (01/28/19 0744)  Pulse: 69 (01/28/19 0744)  Resp: (!) 22 (01/28/19 0744)  BP: (!) 153/67 (01/28/19 0744)  SpO2: 96 % (01/28/19 0744) Vital Signs (24h Range):  Temp:  [96.8 °F (36 °C)-99.3 °F (37.4 °C)] 99.3 °F (37.4 °C)  Pulse:  [65-73] 69  Resp:  [16-22] 22  SpO2:  [90 %-97 %] 96 %  BP: (105-153)/(56-67) 153/67     Weight: 60.3 kg (132 lb 15 oz)  Body mass index is 23.55 kg/m².    Intake/Output Summary (Last 24 hours) at 1/28/2019 0828  Last data filed at 1/28/2019 0700  Gross per 24 hour   Intake 523.19 ml   Output 700 ml   Net -176.81 ml      Physical Exam  GEN: NAD  HEENT: MMM, anicteric, eomi, atraumatic, hoarse voice  CV: RRR, neg murmur, pulse pos+   Lung: CTAB  Abd: soft, nt, nd, bs+  Skin warm and moist, no skin breakdown, multiple skin  tags,   Neuro: aox4, tremor at baseline for 40yrs.  Ext: good ROM, good strength in all extremities.       Recent Labs     01/26/19  0344 01/27/19  0504 01/28/19  0620   WBC 8.02  8.02 10.59 9.99   HGB 10.7*  10.7* 9.8* 9.6*   HCT 36.5*  36.5* 33.4* 32.3*     278 310 337   MCV 74*  74* 72* 72*   RDW 26.2*  26.2* 26.3* 26.4*       Recent Labs     01/26/19  0602 01/27/19  0504 01/28/19  0620    139 138   K 4.1 3.8 3.4*    106 105   CO2 25 23 23   * 109 112*   BUN 25* 33* 25*   CREATININE 1.4 1.2 1.2   CALCIUM 8.8 8.7 8.7   PROT 6.7 6.5 6.5   ALBUMIN 3.2* 3.0* 3.0*   BILITOT 0.7 0.6 0.7   ALKPHOS 98 103 113   AST 14 15 11   ALT 6* 9* 7*   ANIONGAP 7* 10 10   ESTGFRAFRICA 40* 48* 48*   EGFRNONAA 35* 42* 42*       Recent Labs     01/26/19  0602 01/27/19  0504 01/28/19  0620   MG 2.4 2.1 2.1   PHOS 3.2 3.2 2.9       Coags  Lab Results   Component Value Date    INR 1.1 01/28/2019    INR 1.0 01/27/2019    INR 1.0 01/26/2019    APTT 35.9 (H) 01/28/2019    APTT 42.3 (H) 01/27/2019    APTT 42.3 (H) 01/27/2019    APTT 42.3 (H) 01/27/2019     Recent Labs   Lab 01/26/19  0343  01/26/19  1705 01/27/19  0504 01/28/19  0620   INR 1.0  --   --  1.0 1.1   APTT 47.9*   < > 45.1* 42.3*  42.3*  42.3* 35.9*    < > = values in this interval not displayed.       A1c:   Lab Results   Component Value Date    HGBA1C 5.9 07/12/2013   , Last Gluc: No results for input(s): POCTGLUCOSE in the last 168 hours.    TSH: No results found for: TSH      Cardiac Enzymes  Recent Labs     01/26/19  0343 01/26/19  0815 01/26/19  1510   TROPONINI 0.152* 0.125* 0.074*         Urinalysis  Urinalysis  No results for input(s): COLORU, CLARITYU, SPECGRAV, PHUR, PROTEINUA, GLUCOSEU, BILIRUBINCON, BLOODU, WBCU, RBCU, BACTERIA, MUCUS, NITRITE, LEUKOCYTESUR, UROBILINOGEN, HYALINECASTS in the last 24 hours.  No results for input(s): COLORU, CLARITYU, SPECGRAV, PHUR, PROTEINUA, GLUCOSEU, BLOODU, WBCU, RBCU, BACTERIA, MUCUS in the last  24 hours.    Invalid input(s):  BILIRUBINCON    Micro  Microbiology Results (last 7 days)     ** No results found for the last 168 hours. **             ABG  Recent Labs   Lab 01/25/19  1104   PH 7.41   PCO2 31*   PO2 54*   HCO3 19.60*   POCSATURATED 90.5   BE -4.50*         Imaging         Assessment/Plan:    The patient w/ pmhx of atrial flutter, UC, CHF w/ DD and HLD presents with sob and weakness found to have saman segmental PE.     Saman Submassive PE  On heparin drip titrating per normogram and aptt  Received 2 uprbc, H/H downtrending slowly  FOBT neg  Cards recs GI work-up considering new anticoagulation     Microcytic anemia  mcv low, rdw high  Iron studies appear iron deficiency but mixed  Giving iron, folate      R  LE DVT  Confirmed by u/s dvt   Management per above     Chest pain  Resolved     HF w/ DD  Not in acute exacerbation  Repeat echo for home O2 qualification  Resume home lasix, bnp elevated.     ACute Hypokalemia  Replace prn    Atrial flutter  Rate controlled by metoprolol  Stable. EKG shows sinus tachy     NSTEMI  Trop trending up 0.5 peaked, now downtrending, w/o ekg st changes  Cardiology assisting    UC  Stable  Not in acute exacerbation, not really taking home med at home.     OA   Stable  Tylenol prn.     Dispo: Briding to therapeutic coumadin.  Also will attempt to have xarelto filled by pharamacy today to assess whether this is a realistic possibility.  In-patient GI work-up versus outpatient? Down trending microcytic anemia.  Also consider neurofibramatosis?      Active Diagnoses:    Diagnosis Date Noted POA    PRINCIPAL PROBLEM:  Bilateral pulmonary embolism [I26.99] 01/25/2019 Yes    Chest pain [R07.9] 01/25/2019 Yes    Diastolic dysfunction [I51.9] 07/29/2016 Yes    Atrial flutter [I48.92] 07/25/2016 Yes    NSTEMI (non-ST elevated myocardial infarction) [I21.4] 07/23/2016 Yes    Ulcerative colitis [K51.90] 07/26/2012 Yes    Osteoarthritis [M19.90] 07/26/2012 Yes      Problems  Resolved During this Admission:     VTE Risk Mitigation (From admission, onward)        Ordered     warfarin (COUMADIN) tablet 5 mg  Daily      01/27/19 0642     heparin 25,000 units in dextrose 5% (100 units/ml) IV bolus from bag INITIAL BOLUS  Once      01/25/19 2117     heparin 25,000 units in dextrose 5% 250 mL (100 units/mL) infusion HIGH INTENSITY nomogram - [facility]  Continuous      01/25/19 2117     heparin 25,000 units in dextrose 5% (100 units/ml) IV bolus from bag - ADDITIONAL PRN BOLUS - 60 units/kg  As needed (PRN)      01/25/19 2117     heparin 25,000 units in dextrose 5% (100 units/ml) IV bolus from bag - ADDITIONAL PRN BOLUS - 30 units/kg  As needed (PRN)      01/25/19 2117     IP VTE HIGH RISK PATIENT  Once      01/25/19 2108             Jr Aiken MD  Department of Hospital Medicine   [facility]

## 2019-01-29 DIAGNOSIS — I10 ESSENTIAL HYPERTENSION: ICD-10-CM

## 2019-01-29 RX ORDER — SULFASALAZINE 500 MG/1
TABLET ORAL
Qty: 60 TABLET | Refills: 5 | Status: SHIPPED | OUTPATIENT
Start: 2019-01-29 | End: 2019-10-11 | Stop reason: SDUPTHER

## 2019-01-29 RX ORDER — FUROSEMIDE 40 MG/1
TABLET ORAL
Qty: 30 TABLET | Refills: 11 | Status: SHIPPED | OUTPATIENT
Start: 2019-01-29 | End: 2021-02-02

## 2019-01-29 NOTE — PT/OT/SLP DISCHARGE
Physical Therapy Discharge Summary    Name: Katarina Rivera  MRN: [id number]   Principal Problem: Bilateral pulmonary embolism     Patient Discharged from acute Physical Therapy on 1/28/19.  Please refer to prior PT noted date on 1/28/19 for functional status.     Assessment:     Patient appropriate for care in another setting.    Objective:     GOALS:   Multidisciplinary Problems     Physical Therapy Goals     Not on file          Multidisciplinary Problems (Resolved)        Problem: Physical Therapy Goal    Goal Priority Disciplines Outcome Goal Variances Interventions   Physical Therapy Goal   (Resolved)     PT, PT/OT Outcome(s) achieved     Description:  1.  Sit in chair 2 hours  2.  Ambulate 250' with Mod Ind without AD  3.  Bed to/from chair with Mod Ind.  4.  Ind HEP                    Reasons for Discontinuation of Therapy Services  Transfer to alternate level of care.      Plan:     Patient Discharged to: Home.    Princess Torres, PT  1/29/2019

## 2019-02-11 ENCOUNTER — OFFICE VISIT (OUTPATIENT)
Dept: FAMILY MEDICINE | Facility: CLINIC | Age: 84
End: 2019-02-11
Payer: MEDICARE

## 2019-02-11 VITALS
HEIGHT: 63 IN | DIASTOLIC BLOOD PRESSURE: 56 MMHG | RESPIRATION RATE: 18 BRPM | BODY MASS INDEX: 22.75 KG/M2 | SYSTOLIC BLOOD PRESSURE: 136 MMHG | HEART RATE: 64 BPM | WEIGHT: 128.38 LBS

## 2019-02-11 DIAGNOSIS — I82.4Z3 ACUTE DEEP VEIN THROMBOSIS (DVT) OF DISTAL VEIN OF BOTH LOWER EXTREMITIES: ICD-10-CM

## 2019-02-11 DIAGNOSIS — I26.99 BILATERAL PULMONARY EMBOLISM: Primary | ICD-10-CM

## 2019-02-11 DIAGNOSIS — Z87.19 HISTORY OF ULCERATIVE COLITIS: ICD-10-CM

## 2019-02-11 PROCEDURE — 99999 PR PBB SHADOW E&M-EST. PATIENT-LVL III: ICD-10-PCS | Mod: PBBFAC,,, | Performed by: FAMILY MEDICINE

## 2019-02-11 PROCEDURE — 99999 PR PBB SHADOW E&M-EST. PATIENT-LVL III: CPT | Mod: PBBFAC,,, | Performed by: FAMILY MEDICINE

## 2019-02-11 PROCEDURE — 99213 OFFICE O/P EST LOW 20 MIN: CPT | Mod: S$PBB | Performed by: FAMILY MEDICINE

## 2019-02-11 PROCEDURE — 99213 OFFICE O/P EST LOW 20 MIN: CPT | Mod: PBBFAC | Performed by: FAMILY MEDICINE

## 2019-02-11 PROCEDURE — 99999 PR STA SHADOW: CPT | Mod: PBBFAC,,, | Performed by: FAMILY MEDICINE

## 2019-02-11 NOTE — PROGRESS NOTES
Subjective:       Patient ID: Katarina Rivera is a 84 y.o. female.    Chief Complaint: Follow-up (Hospital follow up with blood clots in lungs)    Pt is a 84 y.o. female who presents for check up for Bilateral pulmonary embolism  (primary encounter diagnosis)  History of ulcerative colitis  Acute deep vein thrombosis (dvt) of distal vein of both lower extremities. Doing well on current meds. Denies any side effects. Prevention is up to date.      Review of Systems   Constitutional: Negative for appetite change, chills and fever.   HENT: Negative for rhinorrhea, sinus pressure, sore throat and trouble swallowing.    Respiratory: Positive for shortness of breath. Negative for cough, chest tightness and wheezing.         Barely any more SOB since the hospital   Cardiovascular: Negative for chest pain and palpitations.   Gastrointestinal: Negative for abdominal pain, blood in stool, diarrhea, nausea and vomiting.   Genitourinary: Negative for dysuria, flank pain, hematuria, pelvic pain, urgency, vaginal bleeding, vaginal discharge and vaginal pain.   Musculoskeletal: Negative for back pain, joint swelling and neck stiffness.   Skin: Negative for rash.   Neurological: Negative for dizziness, weakness, light-headedness, numbness and headaches.   Hematological: Does not bruise/bleed easily.   Psychiatric/Behavioral: Negative for agitation. The patient is not nervous/anxious.        Objective:      Physical Exam   Constitutional: She is oriented to person, place, and time. She appears well-developed and well-nourished.   HENT:   Head: Normocephalic.   Eyes: Pupils are equal, round, and reactive to light.   Neck: Normal range of motion. Neck supple. No thyromegaly present.   Cardiovascular: Normal rate and regular rhythm.   Pulmonary/Chest: No respiratory distress. She has no wheezes. She has no rales. She exhibits no tenderness.   Abdominal: She exhibits no distension. There is no tenderness. There is no rebound and no  guarding.   Musculoskeletal: Normal range of motion. She exhibits no edema or tenderness.   Lymphadenopathy:     She has no cervical adenopathy.   Neurological: She is alert and oriented to person, place, and time. She has normal reflexes. She displays normal reflexes. No cranial nerve deficit. She exhibits normal muscle tone. Coordination normal.   Head tremor   Skin: Skin is warm and dry. No rash noted. No pallor.   Psychiatric: She has a normal mood and affect. Judgment and thought content normal.       Assessment:       1. Bilateral pulmonary embolism    2. History of ulcerative colitis    3. Acute deep vein thrombosis (DVT) of distal vein of both lower extremities        Plan:   Katarina was seen today for follow-up.    Diagnoses and all orders for this visit:    Bilateral pulmonary embolism    History of ulcerative colitis    Acute deep vein thrombosis (DVT) of distal vein of both lower extremities    Xarelto 15 bid to 20 mg qd & compression stockings

## 2019-04-05 ENCOUNTER — HOSPITAL ENCOUNTER (OUTPATIENT)
Dept: RADIOLOGY | Facility: HOSPITAL | Age: 84
Discharge: HOME | End: 2019-04-05
Attending: FAMILY MEDICINE
Payer: MEDICARE

## 2019-04-05 VITALS — WEIGHT: 128 LBS | BODY MASS INDEX: 22.68 KG/M2 | HEIGHT: 63 IN

## 2019-04-05 DIAGNOSIS — Z12.31 ENCOUNTER FOR SCREENING MAMMOGRAM FOR BREAST CANCER: ICD-10-CM

## 2019-04-05 PROCEDURE — 77063 BREAST TOMOSYNTHESIS BI: CPT | Mod: 26,,, | Performed by: RADIOLOGY

## 2019-04-05 PROCEDURE — 77063 MAMMO DIGITAL SCREENING BILAT WITH TOMOSYNTHESIS_CAD: ICD-10-PCS | Mod: 26,,, | Performed by: RADIOLOGY

## 2019-04-05 PROCEDURE — 77067 MAMMO DIGITAL SCREENING BILAT WITH TOMOSYNTHESIS_CAD: ICD-10-PCS | Mod: 26,,, | Performed by: RADIOLOGY

## 2019-04-05 PROCEDURE — 77067 SCR MAMMO BI INCL CAD: CPT | Mod: TC

## 2019-04-05 PROCEDURE — 77067 SCR MAMMO BI INCL CAD: CPT | Mod: 26,,, | Performed by: RADIOLOGY

## 2019-10-11 RX ORDER — SULFASALAZINE 500 MG/1
TABLET ORAL
Qty: 60 TABLET | Refills: 5 | Status: SHIPPED | OUTPATIENT
Start: 2019-10-11 | End: 2020-05-07

## 2020-02-13 ENCOUNTER — OFFICE VISIT (OUTPATIENT)
Dept: FAMILY MEDICINE | Facility: CLINIC | Age: 85
End: 2020-02-13
Payer: MEDICARE

## 2020-02-13 VITALS
BODY MASS INDEX: 25.43 KG/M2 | DIASTOLIC BLOOD PRESSURE: 58 MMHG | WEIGHT: 143.5 LBS | TEMPERATURE: 98 F | SYSTOLIC BLOOD PRESSURE: 152 MMHG | HEIGHT: 63 IN | RESPIRATION RATE: 16 BRPM | HEART RATE: 78 BPM

## 2020-02-13 DIAGNOSIS — K51.90 CHRONIC ULCERATIVE COLITIS WITHOUT COMPLICATION, UNSPECIFIED LOCATION: ICD-10-CM

## 2020-02-13 DIAGNOSIS — J02.9 SORE THROAT: ICD-10-CM

## 2020-02-13 DIAGNOSIS — R05.9 COUGH: ICD-10-CM

## 2020-02-13 DIAGNOSIS — R25.1 TREMOR: Primary | ICD-10-CM

## 2020-02-13 PROCEDURE — 99999 PR PBB SHADOW E&M-EST. PATIENT-LVL III: CPT | Mod: PBBFAC,,, | Performed by: FAMILY MEDICINE

## 2020-02-13 PROCEDURE — 99999 PR STA SHADOW: ICD-10-PCS | Mod: PBBFAC,,,

## 2020-02-13 PROCEDURE — 99213 OFFICE O/P EST LOW 20 MIN: CPT | Mod: S$PBB | Performed by: FAMILY MEDICINE

## 2020-02-13 PROCEDURE — 99999 PR PBB SHADOW E&M-EST. PATIENT-LVL III: ICD-10-PCS | Mod: PBBFAC,,, | Performed by: FAMILY MEDICINE

## 2020-02-13 PROCEDURE — 96372 THER/PROPH/DIAG INJ SC/IM: CPT | Mod: PBBFAC

## 2020-02-13 PROCEDURE — 99999 PR STA SHADOW: CPT | Mod: PBBFAC,,, | Performed by: FAMILY MEDICINE

## 2020-02-13 PROCEDURE — 99213 OFFICE O/P EST LOW 20 MIN: CPT | Mod: PBBFAC,25 | Performed by: FAMILY MEDICINE

## 2020-02-13 PROCEDURE — 99999 PR STA SHADOW: CPT | Mod: PBBFAC,,,

## 2020-02-13 RX ORDER — AMOXICILLIN 500 MG/1
500 CAPSULE ORAL 3 TIMES DAILY
Qty: 30 CAPSULE | Refills: 0 | Status: SHIPPED | OUTPATIENT
Start: 2020-02-13 | End: 2020-02-23

## 2020-02-13 RX ORDER — PROMETHAZINE HYDROCHLORIDE AND DEXTROMETHORPHAN HYDROBROMIDE 6.25; 15 MG/5ML; MG/5ML
5 SYRUP ORAL 3 TIMES DAILY PRN
Qty: 118 ML | Refills: 3 | Status: SHIPPED | OUTPATIENT
Start: 2020-02-13 | End: 2020-02-23

## 2020-02-13 RX ORDER — METHYLPREDNISOLONE ACETATE 40 MG/ML
40 INJECTION, SUSPENSION INTRA-ARTICULAR; INTRALESIONAL; INTRAMUSCULAR; SOFT TISSUE
Status: COMPLETED | OUTPATIENT
Start: 2020-02-13 | End: 2020-02-13

## 2020-02-13 RX ADMIN — METHYLPREDNISOLONE ACETATE 40 MG: 40 INJECTION, SUSPENSION INTRA-ARTICULAR; INTRALESIONAL; INTRAMUSCULAR; SOFT TISSUE at 10:02

## 2020-02-13 NOTE — PROGRESS NOTES
Subjective:       Patient ID: Katarina Rivera is a 85 y.o. female.    Chief Complaint: Cough; Nasal Congestion; and Sore Throat    Pt is a 85 y.o. female who presents for check up for URI and congestion.Tremor  (primary encounter diagnosis)  Chronic ulcerative colitis without complication, unspecified location. Doing well on current meds. Denies any side effects. Prevention is up to date.    Review of Systems   Constitutional: Positive for fatigue. Negative for appetite change, chills and fever.   HENT: Positive for congestion, postnasal drip and rhinorrhea. Negative for ear pain, sinus pressure, sore throat and trouble swallowing.    Eyes: Negative for pain, discharge, itching and visual disturbance.   Respiratory: Negative for cough, choking, chest tightness, shortness of breath and wheezing.    Cardiovascular: Negative for chest pain, palpitations and leg swelling.   Gastrointestinal: Negative for abdominal pain, blood in stool, diarrhea, nausea and vomiting.   Genitourinary: Negative for dysuria, flank pain, hematuria, pelvic pain, urgency, vaginal bleeding, vaginal discharge and vaginal pain.   Musculoskeletal: Negative for arthralgias, back pain, joint swelling, myalgias and neck stiffness.   Skin: Negative for rash and wound.   Neurological: Negative for dizziness, weakness, light-headedness, numbness and headaches.   Hematological: Does not bruise/bleed easily.   Psychiatric/Behavioral: Negative for agitation. The patient is not nervous/anxious.        Objective:      Physical Exam   Constitutional: She is oriented to person, place, and time. She appears well-developed and well-nourished.   HENT:   Head: Normocephalic.   Eyes: Pupils are equal, round, and reactive to light.   Neck: Normal range of motion. Neck supple. No thyromegaly present.   Cardiovascular: Normal rate and regular rhythm.   Pulmonary/Chest: No respiratory distress. She has no wheezes. She has no rales. She exhibits no tenderness.   Abdominal:  She exhibits no distension. There is no tenderness. There is no rebound and no guarding.   Musculoskeletal: Normal range of motion. She exhibits no edema or tenderness.   Lymphadenopathy:     She has no cervical adenopathy.   Neurological: She is alert and oriented to person, place, and time. She has normal reflexes. She displays normal reflexes. No cranial nerve deficit. She exhibits normal muscle tone. Coordination normal.   Head Tremor   Skin: Skin is warm and dry. No rash noted. No pallor.   Psychiatric: She has a normal mood and affect. Judgment and thought content normal.       Assessment:       1. Tremor    2. Chronic ulcerative colitis without complication, unspecified location    3. Sore throat        Plan:   Katarina was seen today for cough, nasal congestion and sore throat.    Diagnoses and all orders for this visit:    Tremor    Chronic ulcerative colitis without complication, unspecified location    Sore throat

## 2020-03-16 RX ORDER — METOPROLOL TARTRATE 100 MG/1
TABLET ORAL
Qty: 60 TABLET | Refills: 11 | Status: ON HOLD | OUTPATIENT
Start: 2020-03-16 | End: 2020-11-12 | Stop reason: SDUPTHER

## 2020-05-07 RX ORDER — SULFASALAZINE 500 MG/1
TABLET ORAL
Qty: 60 TABLET | Refills: 5 | Status: SHIPPED | OUTPATIENT
Start: 2020-05-07 | End: 2021-03-09

## 2020-06-21 ENCOUNTER — HOSPITAL ENCOUNTER (EMERGENCY)
Facility: HOSPITAL | Age: 85
Discharge: HOME | End: 2020-06-21
Attending: SURGERY
Payer: MEDICARE

## 2020-06-21 VITALS
BODY MASS INDEX: 23.57 KG/M2 | DIASTOLIC BLOOD PRESSURE: 65 MMHG | WEIGHT: 133.06 LBS | HEART RATE: 108 BPM | TEMPERATURE: 99 F | RESPIRATION RATE: 18 BRPM | OXYGEN SATURATION: 97 % | SYSTOLIC BLOOD PRESSURE: 159 MMHG

## 2020-06-21 DIAGNOSIS — S00.512A ABRASION OF TONGUE, INITIAL ENCOUNTER: Primary | ICD-10-CM

## 2020-06-21 PROCEDURE — 63600175 PHARM REV CODE 636 W HCPCS: Performed by: SURGERY

## 2020-06-21 PROCEDURE — 99284 EMERGENCY DEPT VISIT MOD MDM: CPT | Mod: 25

## 2020-06-21 PROCEDURE — 96372 THER/PROPH/DIAG INJ SC/IM: CPT

## 2020-06-21 RX ORDER — MORPHINE SULFATE 2 MG/ML
1 INJECTION, SOLUTION INTRAMUSCULAR; INTRAVENOUS
Status: COMPLETED | OUTPATIENT
Start: 2020-06-21 | End: 2020-06-21

## 2020-06-21 RX ORDER — HYDROCODONE BITARTRATE AND ACETAMINOPHEN 7.5; 325 MG/15ML; MG/15ML
15 SOLUTION ORAL EVERY 6 HOURS PRN
Qty: 300 ML | Refills: 0 | Status: SHIPPED | OUTPATIENT
Start: 2020-06-21 | End: 2020-07-01

## 2020-06-21 RX ORDER — ONDANSETRON 2 MG/ML
4 INJECTION INTRAMUSCULAR; INTRAVENOUS
Status: COMPLETED | OUTPATIENT
Start: 2020-06-21 | End: 2020-06-21

## 2020-06-21 RX ORDER — AMOXICILLIN 400 MG/5ML
800 POWDER, FOR SUSPENSION ORAL 2 TIMES DAILY
Qty: 140 ML | Refills: 0 | Status: SHIPPED | OUTPATIENT
Start: 2020-06-21 | End: 2020-06-28

## 2020-06-21 RX ADMIN — ONDANSETRON 4 MG: 2 INJECTION INTRAMUSCULAR; INTRAVENOUS at 04:06

## 2020-06-21 RX ADMIN — MORPHINE SULFATE 1 MG: 2 INJECTION, SOLUTION INTRAMUSCULAR; INTRAVENOUS at 04:06

## 2020-06-21 NOTE — ED TRIAGE NOTES
"Patient presents to the ER with tongue pain after cutting it when a tooth cracked "a couple of days" ago.  Patient reports that she cannot talk or eat because of the pain and would like some medication.    "

## 2020-06-21 NOTE — ED PROVIDER NOTES
[facility]                                                 Chief Complaint  86 y.o. female with tongue pain      History of Present Illness  Katarina Rivera presents to the emergency room with right tongue abrasion today  Patient cut her right tongue on a fractured tooth earlier this weekend per daughter  Pt states she has pain from this abrasion/superficial laceration of right tongue  Patient has no facial swelling signs of dental abscess, fractured tooth noted now    The history is provided by the patient   device was not used during this ER visit    Past Medical History   -- Glaucoma (increased eye pressure)      -- Tremor, essential      -- Ulcerative colitis confined to rectum         Past Surgical History   -- Appendectomy        -- Hysterectomy        -- Hemorrhoidectomy        -- Colonoscopy        -- Total knee arthroplasty        -- Colonoscopy        No known allergies    I have reviewed all of this patient's past medical, surgical, family, and social   histories as well as active allergies and medications documented in the  electronic medical record    Review of Systems and Physical Exam      Review of Systems  -- Constitution - no fever, denies fatigue, no weakness, no chills  -- Eyes - no tearing or redness, no visual disturbance  -- Ear, Nose - no tinnitus or earache, no nasal congestion or discharge  -- Mouth,Throat - right tongue abrasion/superficial laceration  -- Respiratory - denies cough and congestion, no shortness of breath, no RUTLEDGE  -- Cardiovascular - denies chest pain, no palpitations, denies claudication  -- Gastrointestinal - denies abdominal pain, nausea, vomiting, or diarrhea  -- Genitourinary - no dysuria, denies flank pain, no hematuria, no STD risk  -- Musculoskeletal - denies back pain, negative for trauma or injury  -- Neurological - no headache, denies weakness or seizure; no LOC  -- Skin - denies pallor, rash, or changes in skin. no hives or  bev noted    Vital Signs  Her temperature is 98.7 °F (37.1 °C).   Her blood pressure is 159/65 and her pulse is 108.   Her respiration is 18 and oxygen saturation is 97%.     Physical Exam  -- Nursing note and vitals reviewed  -- Constitutional: Appears well-developed and well-nourished  -- Head: Atraumatic. Normocephalic. No obvious abnormality  -- Eyes: Pupils are equal and reactive to light. Normal conjunctiva and lids  -- Nose: Nose normal in appearance, nares grossly normal. No discharge  -- Throat: superficial right tongue abrasion, several fractured teeth  -- Ears: External ears and TM normal bilaterally. Normal hearing and no drainage  -- Neck: Normal range of motion. Neck supple. No masses, trachea midline  -- Cardiac: Normal rate, regular rhythm and normal heart sounds  -- Pulmonary: Normal respiratory effort, breath sounds clear to auscultation  -- Abdominal: Soft, no tenderness. Normal bowel sounds. Normal liver edge  -- Musculoskeletal: Normal range of motion, no effusions. Joints stable   -- Neurological: No focal deficits. Showed good interaction with staff    Emergency Room Course      Medications Given  morphine injection 1 mg (has no administration in time range)   ondansetron injection 4 mg (has no administration in time range)     Diagnosis  Final diagnoses:  [S00.512A] Abrasion of tongue, initial encounter (Primary)    Disposition and Plan  -- Disposition: to dentist ASAP  -- Condition: stable  -- Pt given instructions; take all medications prescribed in the ER as directed.   -- Patient agrees to comply with all instructions- needs appropriate dental care  -- Pt agrees to return to ER if any symptoms reoccur; symptom-free on discharge  -- Patient to follow up with a dentist in 1-2 days. Has been given follow up information  -- The patient acknowledges that dental follow up is required for this issue     This note is dictated on M*Modal word recognition program.  There are word recognition  mistakes that are occasionally missed on review.         Filipe Heredia MD  06/21/20 [id number]

## 2020-07-15 DIAGNOSIS — Z71.89 COMPLEX CARE COORDINATION: ICD-10-CM

## 2020-11-07 ENCOUNTER — HOSPITAL ENCOUNTER (INPATIENT)
Facility: HOSPITAL | Age: 85
LOS: 5 days | Discharge: HOME HEALTH | DRG: 386 | End: 2020-11-12
Attending: FAMILY MEDICINE | Admitting: FAMILY MEDICINE
Payer: MEDICARE

## 2020-11-07 ENCOUNTER — HOSPITAL ENCOUNTER (EMERGENCY)
Facility: HOSPITAL | Age: 85
Discharge: ANOTHER HOSPITAL | End: 2020-11-07
Attending: SURGERY
Payer: MEDICARE

## 2020-11-07 VITALS
TEMPERATURE: 99 F | HEART RATE: 96 BPM | RESPIRATION RATE: 16 BRPM | SYSTOLIC BLOOD PRESSURE: 132 MMHG | DIASTOLIC BLOOD PRESSURE: 67 MMHG | OXYGEN SATURATION: 98 %

## 2020-11-07 DIAGNOSIS — K92.2 GI BLEED: Primary | ICD-10-CM

## 2020-11-07 DIAGNOSIS — K51.011 ULCERATIVE PANCOLITIS WITH RECTAL BLEEDING: ICD-10-CM

## 2020-11-07 DIAGNOSIS — K92.2 GASTROINTESTINAL HEMORRHAGE, UNSPECIFIED GASTROINTESTINAL HEMORRHAGE TYPE: Primary | ICD-10-CM

## 2020-11-07 DIAGNOSIS — K51.90 CHRONIC ULCERATIVE COLITIS WITHOUT COMPLICATION, UNSPECIFIED LOCATION: ICD-10-CM

## 2020-11-07 DIAGNOSIS — R53.83 FATIGUE: ICD-10-CM

## 2020-11-07 LAB
ABO + RH BLD: NORMAL
ABO + RH BLD: NORMAL
ALBUMIN SERPL BCP-MCNC: 3.3 G/DL (ref 3.5–5.2)
ALP SERPL-CCNC: 82 U/L (ref 55–135)
ALT SERPL W/O P-5'-P-CCNC: <5 U/L (ref 10–44)
ANION GAP SERPL CALC-SCNC: 13 MMOL/L (ref 8–16)
ANISOCYTOSIS BLD QL SMEAR: ABNORMAL
APTT BLDCRRT: 26.6 SEC (ref 21–32)
AST SERPL-CCNC: 13 U/L (ref 10–40)
BASOPHILS # BLD AUTO: 0.05 K/UL (ref 0–0.2)
BASOPHILS NFR BLD: 0.4 % (ref 0–1.9)
BILIRUB SERPL-MCNC: 0.5 MG/DL (ref 0.1–1)
BLD GP AB SCN CELLS X3 SERPL QL: NORMAL
BLD GP AB SCN CELLS X3 SERPL QL: NORMAL
BLD PROD TYP BPU: NORMAL
BLD PROD TYP BPU: NORMAL
BLOOD UNIT EXPIRATION DATE: NORMAL
BLOOD UNIT EXPIRATION DATE: NORMAL
BLOOD UNIT TYPE CODE: 5100
BLOOD UNIT TYPE CODE: 5100
BLOOD UNIT TYPE: NORMAL
BLOOD UNIT TYPE: NORMAL
BNP SERPL-MCNC: 333 PG/ML (ref 0–99)
BUN SERPL-MCNC: 21 MG/DL (ref 8–23)
CALCIUM SERPL-MCNC: 8.7 MG/DL (ref 8.7–10.5)
CHLORIDE SERPL-SCNC: 106 MMOL/L (ref 95–110)
CK MB SERPL-MCNC: 1.4 NG/ML (ref 0.1–6.5)
CK MB SERPL-RTO: 4.4 % (ref 0–5)
CK SERPL-CCNC: 32 U/L (ref 20–180)
CK SERPL-CCNC: 32 U/L (ref 20–180)
CO2 SERPL-SCNC: 19 MMOL/L (ref 23–29)
CODING SYSTEM: NORMAL
CODING SYSTEM: NORMAL
CREAT SERPL-MCNC: 1.3 MG/DL (ref 0.5–1.4)
CRP SERPL-MCNC: 40.5 MG/L (ref 0–8.2)
D DIMER PPP IA.FEU-MCNC: 4.55 MG/L FEU
DACRYOCYTES BLD QL SMEAR: ABNORMAL
DIFFERENTIAL METHOD: ABNORMAL
DISPENSE STATUS: NORMAL
DISPENSE STATUS: NORMAL
EOSINOPHIL # BLD AUTO: 0.1 K/UL (ref 0–0.5)
EOSINOPHIL NFR BLD: 1 % (ref 0–8)
ERYTHROCYTE [DISTWIDTH] IN BLOOD BY AUTOMATED COUNT: 22.4 % (ref 11.5–14.5)
ERYTHROCYTE [SEDIMENTATION RATE] IN BLOOD BY WESTERGREN METHOD: 14 MM/HR (ref 0–20)
EST. GFR  (AFRICAN AMERICAN): 43 ML/MIN/1.73 M^2
EST. GFR  (NON AFRICAN AMERICAN): 37 ML/MIN/1.73 M^2
FERRITIN SERPL-MCNC: 3 NG/ML (ref 20–300)
GLUCOSE SERPL-MCNC: 87 MG/DL (ref 70–110)
GROUP A STREP, MOLECULAR: NEGATIVE
HCT VFR BLD AUTO: 22 % (ref 37–48.5)
HGB BLD-MCNC: 5.7 G/DL (ref 12–16)
HYPOCHROMIA BLD QL SMEAR: ABNORMAL
IMM GRANULOCYTES # BLD AUTO: 0.05 K/UL (ref 0–0.04)
IMM GRANULOCYTES NFR BLD AUTO: 0.4 % (ref 0–0.5)
INFLUENZA A, MOLECULAR: NEGATIVE
INFLUENZA B, MOLECULAR: NEGATIVE
INR PPP: 1.1 (ref 0.8–1.2)
LACTATE SERPL-SCNC: 1.3 MMOL/L (ref 0.5–2.2)
LDH SERPL L TO P-CCNC: 179 U/L (ref 110–260)
LYMPHOCYTES # BLD AUTO: 2.2 K/UL (ref 1–4.8)
LYMPHOCYTES NFR BLD: 19.3 % (ref 18–48)
MAGNESIUM SERPL-MCNC: 2.1 MG/DL (ref 1.6–2.6)
MCH RBC QN AUTO: 15.3 PG (ref 27–31)
MCHC RBC AUTO-ENTMCNC: 25.9 G/DL (ref 32–36)
MCV RBC AUTO: 59 FL (ref 82–98)
MONOCYTES # BLD AUTO: 1.2 K/UL (ref 0.3–1)
MONOCYTES NFR BLD: 10.3 % (ref 4–15)
NEUTROPHILS # BLD AUTO: 7.8 K/UL (ref 1.8–7.7)
NEUTROPHILS NFR BLD: 68.6 % (ref 38–73)
NRBC BLD-RTO: 0 /100 WBC
OB PNL STL: POSITIVE
OVALOCYTES BLD QL SMEAR: ABNORMAL
PHOSPHATE SERPL-MCNC: 2.8 MG/DL (ref 2.7–4.5)
PLATELET # BLD AUTO: 467 K/UL (ref 150–350)
PLATELET BLD QL SMEAR: ABNORMAL
PMV BLD AUTO: 9.8 FL (ref 9.2–12.9)
POIKILOCYTOSIS BLD QL SMEAR: ABNORMAL
POTASSIUM SERPL-SCNC: 3.6 MMOL/L (ref 3.5–5.1)
PROCALCITONIN SERPL IA-MCNC: 0.09 NG/ML
PROT SERPL-MCNC: 7.1 G/DL (ref 6–8.4)
PROTHROMBIN TIME: 11.9 SEC (ref 9–12.5)
RBC # BLD AUTO: 3.73 M/UL (ref 4–5.4)
RV AG STL QL IA.RAPID: NEGATIVE
SARS-COV-2 RDRP RESP QL NAA+PROBE: NEGATIVE
SCHISTOCYTES BLD QL SMEAR: ABNORMAL
SODIUM SERPL-SCNC: 138 MMOL/L (ref 136–145)
SPECIMEN SOURCE: NORMAL
TRANS ERYTHROCYTES VOL PATIENT: NORMAL ML
TRANS ERYTHROCYTES VOL PATIENT: NORMAL ML
TROPONIN I SERPL DL<=0.01 NG/ML-MCNC: <0.006 NG/ML (ref 0–0.03)
TSH SERPL DL<=0.005 MIU/L-ACNC: 3.02 UIU/ML (ref 0.4–4)
WBC # BLD AUTO: 11.44 K/UL (ref 3.9–12.7)

## 2020-11-07 PROCEDURE — 96360 HYDRATION IV INFUSION INIT: CPT

## 2020-11-07 PROCEDURE — 86140 C-REACTIVE PROTEIN: CPT

## 2020-11-07 PROCEDURE — 83735 ASSAY OF MAGNESIUM: CPT

## 2020-11-07 PROCEDURE — 84484 ASSAY OF TROPONIN QUANT: CPT

## 2020-11-07 PROCEDURE — 82550 ASSAY OF CK (CPK): CPT

## 2020-11-07 PROCEDURE — 25000003 PHARM REV CODE 250: Performed by: FAMILY MEDICINE

## 2020-11-07 PROCEDURE — 87045 FECES CULTURE AEROBIC BACT: CPT

## 2020-11-07 PROCEDURE — 87425 ROTAVIRUS AG IA: CPT

## 2020-11-07 PROCEDURE — 87427 SHIGA-LIKE TOXIN AG IA: CPT | Mod: 59

## 2020-11-07 PROCEDURE — 83615 LACTATE (LD) (LDH) ENZYME: CPT

## 2020-11-07 PROCEDURE — 25500020 PHARM REV CODE 255: Performed by: SURGERY

## 2020-11-07 PROCEDURE — 36430 TRANSFUSION BLD/BLD COMPNT: CPT

## 2020-11-07 PROCEDURE — 25000003 PHARM REV CODE 250: Performed by: SURGERY

## 2020-11-07 PROCEDURE — 36415 COLL VENOUS BLD VENIPUNCTURE: CPT

## 2020-11-07 PROCEDURE — 93005 ELECTROCARDIOGRAM TRACING: CPT

## 2020-11-07 PROCEDURE — 86850 RBC ANTIBODY SCREEN: CPT

## 2020-11-07 PROCEDURE — 85379 FIBRIN DEGRADATION QUANT: CPT

## 2020-11-07 PROCEDURE — 84145 PROCALCITONIN (PCT): CPT

## 2020-11-07 PROCEDURE — 82553 CREATINE MB FRACTION: CPT

## 2020-11-07 PROCEDURE — C9113 INJ PANTOPRAZOLE SODIUM, VIA: HCPCS | Performed by: FAMILY MEDICINE

## 2020-11-07 PROCEDURE — 89055 LEUKOCYTE ASSESSMENT FECAL: CPT

## 2020-11-07 PROCEDURE — 96361 HYDRATE IV INFUSION ADD-ON: CPT

## 2020-11-07 PROCEDURE — 93010 ELECTROCARDIOGRAM REPORT: CPT | Mod: ,,, | Performed by: INTERNAL MEDICINE

## 2020-11-07 PROCEDURE — 87651 STREP A DNA AMP PROBE: CPT

## 2020-11-07 PROCEDURE — 63600175 PHARM REV CODE 636 W HCPCS: Performed by: FAMILY MEDICINE

## 2020-11-07 PROCEDURE — 84100 ASSAY OF PHOSPHORUS: CPT

## 2020-11-07 PROCEDURE — U0002 COVID-19 LAB TEST NON-CDC: HCPCS

## 2020-11-07 PROCEDURE — 80053 COMPREHEN METABOLIC PANEL: CPT

## 2020-11-07 PROCEDURE — 85651 RBC SED RATE NONAUTOMATED: CPT

## 2020-11-07 PROCEDURE — 87046 STOOL CULTR AEROBIC BACT EA: CPT | Mod: 59

## 2020-11-07 PROCEDURE — 82272 OCCULT BLD FECES 1-3 TESTS: CPT

## 2020-11-07 PROCEDURE — 85730 THROMBOPLASTIN TIME PARTIAL: CPT

## 2020-11-07 PROCEDURE — 85610 PROTHROMBIN TIME: CPT

## 2020-11-07 PROCEDURE — 83605 ASSAY OF LACTIC ACID: CPT

## 2020-11-07 PROCEDURE — 93010 EKG 12-LEAD: ICD-10-PCS | Mod: ,,, | Performed by: INTERNAL MEDICINE

## 2020-11-07 PROCEDURE — 84443 ASSAY THYROID STIM HORMONE: CPT

## 2020-11-07 PROCEDURE — 86850 RBC ANTIBODY SCREEN: CPT | Mod: 91

## 2020-11-07 PROCEDURE — P9021 RED BLOOD CELLS UNIT: HCPCS

## 2020-11-07 PROCEDURE — 82728 ASSAY OF FERRITIN: CPT

## 2020-11-07 PROCEDURE — 86920 COMPATIBILITY TEST SPIN: CPT

## 2020-11-07 PROCEDURE — 87502 INFLUENZA DNA AMP PROBE: CPT

## 2020-11-07 PROCEDURE — 99285 EMERGENCY DEPT VISIT HI MDM: CPT | Mod: 25

## 2020-11-07 PROCEDURE — 83880 ASSAY OF NATRIURETIC PEPTIDE: CPT

## 2020-11-07 PROCEDURE — 87040 BLOOD CULTURE FOR BACTERIA: CPT

## 2020-11-07 PROCEDURE — 11000001 HC ACUTE MED/SURG PRIVATE ROOM

## 2020-11-07 PROCEDURE — 85025 COMPLETE CBC W/AUTO DIFF WBC: CPT

## 2020-11-07 RX ORDER — ATORVASTATIN CALCIUM 20 MG/1
20 TABLET, FILM COATED ORAL DAILY
Status: DISCONTINUED | OUTPATIENT
Start: 2020-11-08 | End: 2020-11-12 | Stop reason: HOSPADM

## 2020-11-07 RX ORDER — METOPROLOL SUCCINATE 50 MG/1
50 TABLET, EXTENDED RELEASE ORAL DAILY
Status: DISCONTINUED | OUTPATIENT
Start: 2020-11-07 | End: 2020-11-08

## 2020-11-07 RX ORDER — HYDROCODONE BITARTRATE AND ACETAMINOPHEN 500; 5 MG/1; MG/1
TABLET ORAL
Status: DISCONTINUED | OUTPATIENT
Start: 2020-11-07 | End: 2020-11-07 | Stop reason: HOSPADM

## 2020-11-07 RX ORDER — SULFASALAZINE 500 MG/1
500 TABLET ORAL 2 TIMES DAILY
Status: DISCONTINUED | OUTPATIENT
Start: 2020-11-08 | End: 2020-11-12 | Stop reason: HOSPADM

## 2020-11-07 RX ORDER — LATANOPROST 50 UG/ML
1 SOLUTION/ DROPS OPHTHALMIC NIGHTLY
Status: DISCONTINUED | OUTPATIENT
Start: 2020-11-07 | End: 2020-11-12 | Stop reason: HOSPADM

## 2020-11-07 RX ORDER — PANTOPRAZOLE SODIUM 40 MG/10ML
40 INJECTION, POWDER, LYOPHILIZED, FOR SOLUTION INTRAVENOUS 2 TIMES DAILY
Status: DISCONTINUED | OUTPATIENT
Start: 2020-11-07 | End: 2020-11-12

## 2020-11-07 RX ADMIN — METOPROLOL SUCCINATE 50 MG: 50 TABLET, EXTENDED RELEASE ORAL at 07:11

## 2020-11-07 RX ADMIN — SODIUM CHLORIDE 1000 ML: 0.9 INJECTION, SOLUTION INTRAVENOUS at 12:11

## 2020-11-07 RX ADMIN — PANTOPRAZOLE SODIUM 40 MG: 40 INJECTION, POWDER, LYOPHILIZED, FOR SOLUTION INTRAVENOUS at 08:11

## 2020-11-07 RX ADMIN — IOHEXOL 75 ML: 350 INJECTION, SOLUTION INTRAVENOUS at 01:11

## 2020-11-07 RX ADMIN — LATANOPROST 1 DROP: 50 SOLUTION OPHTHALMIC at 08:11

## 2020-11-07 RX ADMIN — SODIUM CHLORIDE 1000 ML: 0.9 INJECTION, SOLUTION INTRAVENOUS at 11:11

## 2020-11-07 NOTE — ED PROVIDER NOTES
"Encounter Date: 11/7/2020       History     Chief Complaint   Patient presents with    Fall    Rectal Bleeding      blood in stool for months     Katarina Rivera is a 86 y.o. female with PMH of Glaucoma, Ho blood clots, essential tremor, and ulcerative colitis who presents to the ED for evaluation of rectal bleeding.   Ho ulcerative colitis with rectal bleeding for "months," worse over the last few days. She became concerned yesterday when she became weak, causing her to fall which prompted her ED visit today.  She denies abdominal pain or vomiting. She has been having loose, bloody stools that is chronic.   Denies decreased appetite, but reports that everything "passes straight through me."   She does not endorse fever, chills or body aches.   She currently takes xarelto daily.       The history is provided by the patient and a relative.     Review of patient's allergies indicates:  No Known Allergies  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      Past Surgical History:   Procedure Laterality Date    APPENDECTOMY      COLONOSCOPY      COLONOSCOPY N/A 11/10/2015    Procedure: COLONOSCOPY;  Surgeon: Michael San MD;  Location: [address];  Service: Endoscopy;  Laterality: N/A;    hemorrhoidectomy      HYSTERECTOMY      OOPHORECTOMY      TOTAL KNEE ARTHROPLASTY      left      Family History   Problem Relation Age of Onset    Stroke Mother     Blindness Father     Colon cancer Neg Hx     Ulcerative colitis Neg Hx      Social History     Tobacco Use    Smoking status: Never Smoker    Smokeless tobacco: Never Used   Substance Use Topics    Alcohol use: No    Drug use: No     Review of Systems   Constitutional: Positive for activity change. Negative for chills and fever.   HENT: Negative.  Negative for congestion, ear discharge, ear pain, postnasal drip, sinus pressure, " sinus pain and sore throat.    Eyes: Negative.    Respiratory: Positive for shortness of breath. Negative for cough and chest tightness.    Cardiovascular: Negative.  Negative for chest pain and palpitations.   Gastrointestinal: Positive for blood in stool and diarrhea. Negative for abdominal distention, abdominal pain, nausea, rectal pain and vomiting.   Endocrine: Negative.    Genitourinary: Negative.  Negative for dysuria, frequency and urgency.   Musculoskeletal: Negative.  Negative for back pain.   Skin: Positive for wound (nasal contusion). Negative for rash.   Allergic/Immunologic: Negative.    Neurological: Positive for tremors and weakness (generalized). Negative for dizziness, light-headedness and numbness.   Hematological: Negative.  Does not bruise/bleed easily.   Psychiatric/Behavioral: Negative.        Physical Exam     Initial Vitals [11/07/20 1101]   BP Pulse Resp Temp SpO2   -- -- -- 99.5 °F (37.5 °C) --      MAP       --         Physical Exam    Nursing note and vitals reviewed.  Constitutional: She appears well-developed.   HENT:   Head: Normocephalic and atraumatic.   Right Ear: Tympanic membrane, external ear and ear canal normal. Tympanic membrane is not erythematous. No middle ear effusion.   Left Ear: Tympanic membrane, external ear and ear canal normal. Tympanic membrane is not erythematous.  No middle ear effusion.   Nose: Nose normal.   Mouth/Throat: Uvula is midline, oropharynx is clear and moist and mucous membranes are normal. Mucous membranes are not pale and not dry.   Eyes: Conjunctivae and EOM are normal. Pupils are equal, round, and reactive to light.   Neck: Normal range of motion. Neck supple.   Cardiovascular: Normal rate, regular rhythm, intact distal pulses and normal pulses.  No extrasystoles are present.  PMI is not displaced.  Exam reveals no decreased pulses.    Murmur heard.   Systolic murmur is present.  Pulmonary/Chest: Effort normal and breath sounds normal. She has no  decreased breath sounds. She has no wheezes. She has no rhonchi. She has no rales.   Abdominal: Soft. Bowel sounds are normal. There is no abdominal tenderness.   Musculoskeletal: Normal range of motion.   Neurological: She is alert and oriented to person, place, and time. She has normal strength. She displays tremor. She displays normal reflexes. No cranial nerve deficit or sensory deficit. GCS eye subscore is 4. GCS verbal subscore is 5. GCS motor subscore is 6.   Skin: Skin is warm and dry. Capillary refill takes less than 2 seconds. No rash noted. No cyanosis. There is pallor. Nails show no clubbing.   Psychiatric: She has a normal mood and affect. Her behavior is normal. Judgment and thought content normal.         ED Course   Procedures  Labs Reviewed   CBC W/ AUTO DIFFERENTIAL - Abnormal; Notable for the following components:       Result Value    RBC 3.73 (*)     Hemoglobin 5.7 (*)     Hematocrit 22.0 (*)     MCV 59 (*)     MCH 15.3 (*)     MCHC 25.9 (*)     RDW 22.4 (*)     Platelets 467 (*)     Gran # (ANC) 7.8 (*)     Immature Grans (Abs) 0.05 (*)     Mono # 1.2 (*)     Platelet Estimate Increased (*)     All other components within normal limits    Narrative:      Hemoglobin  critical result(s) called and verbal readback obtained   from Vic Smiley RN by MARY ELLEN 11/07/2020 12:02   COMPREHENSIVE METABOLIC PANEL - Abnormal; Notable for the following components:    CO2 19 (*)     Albumin 3.3 (*)     ALT <5 (*)     eGFR if  43 (*)     eGFR if non  37 (*)     All other components within normal limits   B-TYPE NATRIURETIC PEPTIDE - Abnormal; Notable for the following components:     (*)     All other components within normal limits   D DIMER, QUANTITATIVE - Abnormal; Notable for the following components:    D-Dimer 4.55 (*)     All other components within normal limits   INFLUENZA A & B BY MOLECULAR   GROUP A STREP, MOLECULAR   CULTURE, BLOOD   CULTURE, BLOOD   CULTURE,  STOOL   CLOSTRIDIUM DIFFICILE   ENTEROHEMORRHAGIC E.COLI   SARS-COV-2 RNA AMPLIFICATION, QUAL   TROPONIN I   CK   CK-MB   LACTIC ACID, PLASMA   PROTIME-INR   APTT   SEDIMENTATION RATE   PROCALCITONIN   LACTATE DEHYDROGENASE   MAGNESIUM   PHOSPHORUS   TSH   C-REACTIVE PROTEIN   FERRITIN   URINALYSIS, REFLEX TO URINE CULTURE   STOOL EXAM-OVA,CYSTS,PARASITES   WBC, STOOL   ROTAVIRUS ANTIGEN, STOOL   OCCULT BLOOD X 1, STOOL   GIARDIA/CRYPTOSPORIDIUM (EIA)   TYPE & SCREEN   PREPARE RBC SOFT          Imaging Results          CT Abdomen Pelvis With Contrast (Final result)  Result time 11/07/20 13:28:23    Final result by Jose Armando Jama MD (11/07/20 13:28:23)                 Impression:      1. Long segment proctocolitis which could reflect inflammatory disease (UC) or infectious etiology.  2. Indeterminate but unchanged left adrenal nodule.  Suggest elective adrenal mass protocol CT if further characterization is desired.  3. Geographic hypodense lesion in the spleen favored secondary to phase of contrast.  Infarct is an additional consideration.  4. Enhancing lesion right hepatic lobe suspicious for a portosystemic collateral.      Electronically signed by: Jose Armando Jama  Date:    11/07/2020  Time:    13:28             Narrative:    EXAMINATION:  CT ABDOMEN PELVIS WITH CONTRAST    CLINICAL HISTORY:  Abdominal distension;Nausea/vomiting;    TECHNIQUE:  Low dose axial images, sagittal and coronal reformations were obtained from the lung bases to the pubic symphysis following the IV administration of 75 mL of Omnipaque 350 .  Oral contrast was not given.    COMPARISON:  01/25/2019    FINDINGS:  Lung bases are clear.  Normal sized heart.  Calcification aortic valve.  Nondistended gallbladder.    There is thinning of the renal cortices especially on the left.  Simple cyst lower pole right kidney.  Pancreas atrophy.  Wedge-shaped peripheral hypodense lesion splenic midbody.  Nodular hyperdense lesion inferior right  hepatic lobe, segment 6, 1.1 cm in size.  1.6 cm left adrenal nodule Hounsfield units 64.  Remaining solid abdominal organs are unremarkable.    There is no enteric contrast which limits bowel assessment.  No dilated bowel loops.  Circumferential wall thickening with mucosal enhancement rectum, extending proximally through the colon to the transverse segment.    Atherosclerosis.  Small fat containing umbilical defect.  Borderline enlarged lymph nodes in the right lower abdominal quadrant mesentery with reference measuring 1 cm series 2, image 70.  Bilateral small fat containing inguinal hernias.  Hysterectomy.  Urinary bladder unremarkable.    Slight levocurvature of the lumbar spine.  Mild multilevel degenerative disc disease of the upper 2 lumbar levels and included lower thoracic levels.                               X-Ray Chest 1 View (Final result)  Result time 11/07/20 12:12:28    Final result by Jose Armando Jama MD (11/07/20 12:12:28)                 Impression:      Mild pulmonary interstitial disease with differential to include edema, pneumonitis, atypical infection.      Electronically signed by: Jose Armando Jama  Date:    11/07/2020  Time:    12:12             Narrative:    EXAMINATION:  XR CHEST 1 VIEW    CLINICAL HISTORY:  fatigue;    TECHNIQUE:  Single frontal view of the chest was performed.    COMPARISON:  01/25/2019    FINDINGS:  Faint perihilar interstitial opacities.  Normal size heart.  Aortic arch atherosclerosis.  Prominent pericardial fat deposition left costophrenic angle.  No pneumothorax.  Slight levocurvature centered near the thoracolumbar junction.                                                             Clinical Impression:       ICD-10-CM ICD-9-CM   1. Gastrointestinal hemorrhage, unspecified gastrointestinal hemorrhage type  K92.2 578.9   2. Fatigue  R53.83 780.79                          ED Disposition Condition    Transfer to Another Facility Stable                   This  Patient Was Turned Over To Me From The Nurse Practitioner     -- I took over this note from the nurse practitioner this shift  -- His/her documentation and exam is above this line, my documentation is below  -- patient with lower GI bleed for last week, worse this morning at home today  -- patient had diarrhea with bright red blood per ER interview this morning here  -- patient with long history of ulcerative colitis, has not seen GI in several years  -- hemoglobin is 5.8, being transfused, lower GI bleed, on Xarelto every day            Filipe Heredia MD  11/07/20 [id number]

## 2020-11-07 NOTE — ED TRIAGE NOTES
"Stated she has been having chronic rectal bleeding during/after bm's for months. Stated her knee "gave out" last night and she fell. Has no c/o of pain.   "

## 2020-11-07 NOTE — PLAN OF CARE
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  No abdominal pain. In ED: VSS, rectal - bright red blood. Hb 5.7 and Hct 22 (from 1.6 and 32 1 yr ago). Transfusing 2 units pRBCs.  ESR low at 14, CRP in process. Lactic acid wnl. D Dimer elevated at 4.55.  Stool studies sent - rotavirus Ag negative; occult blood positive; stool WBC, O&P, giardia/crypto in process.  CT- proctosigmoid ulcerative colitis up to transverse colon, did not appear to be a flare.  Transfer request initiated for GI consult.    VTE Hx:  January 2019  U/S 2019- R posterior tibial, peroneal, and B popliteal veins  Extensive PE involving B pulmonary arteries, extending subsegmental    VS: VSS, see Epic    Labs: see above and Epic.  plts 467, INR wnl, PTT wnl. Cr 1.3 (baseline 1.2).  COVID negative. Trop negative. BNP mildly elevated at 333 (lower than 1 yr ago)    Radiographs: see above    To Do List: Admit to , consult GI- Dr Wilburn agreeable to consult.  For h/o B PE and DVT (?unprovoked), May need retrievable IVC filter in setting of interruption of anticoagualtion (xarelto will wash out over next 48 hours). D Dimer is currently elevated - this is nonspecific as an acute phase reactant     Upon patient arrival to floor, please contact Hospital Medicine on call.     Susu Garcia MD  Hospital Medicine Staff  Cell: [phone]

## 2020-11-08 PROBLEM — Z86.718 HISTORY OF DVT (DEEP VEIN THROMBOSIS): Status: ACTIVE | Noted: 2019-01-28

## 2020-11-08 PROBLEM — R53.1 WEAKNESS: Status: ACTIVE | Noted: 2020-11-08

## 2020-11-08 PROBLEM — Z86.711 HISTORY OF PULMONARY EMBOLISM: Status: ACTIVE | Noted: 2019-01-25

## 2020-11-08 LAB
ANISOCYTOSIS BLD QL SMEAR: ABNORMAL
ANISOCYTOSIS BLD QL SMEAR: ABNORMAL
BASOPHILS # BLD AUTO: 0.06 K/UL (ref 0–0.2)
BASOPHILS # BLD AUTO: 0.07 K/UL (ref 0–0.2)
BASOPHILS # BLD AUTO: 0.09 K/UL (ref 0–0.2)
BASOPHILS # BLD AUTO: 0.12 K/UL (ref 0–0.2)
BASOPHILS NFR BLD: 0.7 % (ref 0–1.9)
BASOPHILS NFR BLD: 0.7 % (ref 0–1.9)
BASOPHILS NFR BLD: 0.8 % (ref 0–1.9)
BASOPHILS NFR BLD: 1 % (ref 0–1.9)
DACRYOCYTES BLD QL SMEAR: ABNORMAL
DIFFERENTIAL METHOD: ABNORMAL
EOSINOPHIL # BLD AUTO: 0.2 K/UL (ref 0–0.5)
EOSINOPHIL # BLD AUTO: 0.2 K/UL (ref 0–0.5)
EOSINOPHIL # BLD AUTO: 0.3 K/UL (ref 0–0.5)
EOSINOPHIL # BLD AUTO: 0.3 K/UL (ref 0–0.5)
EOSINOPHIL NFR BLD: 1.9 % (ref 0–8)
EOSINOPHIL NFR BLD: 2.4 % (ref 0–8)
EOSINOPHIL NFR BLD: 2.6 % (ref 0–8)
EOSINOPHIL NFR BLD: 2.7 % (ref 0–8)
ERYTHROCYTE [DISTWIDTH] IN BLOOD BY AUTOMATED COUNT: 29.8 % (ref 11.5–14.5)
ERYTHROCYTE [DISTWIDTH] IN BLOOD BY AUTOMATED COUNT: 29.9 % (ref 11.5–14.5)
ERYTHROCYTE [DISTWIDTH] IN BLOOD BY AUTOMATED COUNT: 29.9 % (ref 11.5–14.5)
ERYTHROCYTE [DISTWIDTH] IN BLOOD BY AUTOMATED COUNT: 30.4 % (ref 11.5–14.5)
HCT VFR BLD AUTO: 27.7 % (ref 37–48.5)
HCT VFR BLD AUTO: 29.5 % (ref 37–48.5)
HCT VFR BLD AUTO: 31.3 % (ref 37–48.5)
HCT VFR BLD AUTO: 34.4 % (ref 37–48.5)
HGB BLD-MCNC: 10.3 G/DL (ref 12–16)
HGB BLD-MCNC: 8.4 G/DL (ref 12–16)
HGB BLD-MCNC: 8.8 G/DL (ref 12–16)
HGB BLD-MCNC: 9.2 G/DL (ref 12–16)
HYPOCHROMIA BLD QL SMEAR: ABNORMAL
HYPOCHROMIA BLD QL SMEAR: ABNORMAL
IMM GRANULOCYTES # BLD AUTO: 0.12 K/UL (ref 0–0.04)
IMM GRANULOCYTES # BLD AUTO: 0.12 K/UL (ref 0–0.04)
IMM GRANULOCYTES # BLD AUTO: 0.15 K/UL (ref 0–0.04)
IMM GRANULOCYTES # BLD AUTO: 0.2 K/UL (ref 0–0.04)
IMM GRANULOCYTES NFR BLD AUTO: 1.3 % (ref 0–0.5)
IMM GRANULOCYTES NFR BLD AUTO: 1.3 % (ref 0–0.5)
IMM GRANULOCYTES NFR BLD AUTO: 1.4 % (ref 0–0.5)
IMM GRANULOCYTES NFR BLD AUTO: 1.7 % (ref 0–0.5)
LYMPHOCYTES # BLD AUTO: 1.9 K/UL (ref 1–4.8)
LYMPHOCYTES # BLD AUTO: 1.9 K/UL (ref 1–4.8)
LYMPHOCYTES # BLD AUTO: 2 K/UL (ref 1–4.8)
LYMPHOCYTES # BLD AUTO: 2.2 K/UL (ref 1–4.8)
LYMPHOCYTES NFR BLD: 16.2 % (ref 18–48)
LYMPHOCYTES NFR BLD: 19.9 % (ref 18–48)
LYMPHOCYTES NFR BLD: 20.2 % (ref 18–48)
LYMPHOCYTES NFR BLD: 20.6 % (ref 18–48)
MCH RBC QN AUTO: 20.1 PG (ref 27–31)
MCH RBC QN AUTO: 20.3 PG (ref 27–31)
MCH RBC QN AUTO: 20.3 PG (ref 27–31)
MCH RBC QN AUTO: 20.6 PG (ref 27–31)
MCHC RBC AUTO-ENTMCNC: 29.4 G/DL (ref 32–36)
MCHC RBC AUTO-ENTMCNC: 29.8 G/DL (ref 32–36)
MCHC RBC AUTO-ENTMCNC: 29.9 G/DL (ref 32–36)
MCHC RBC AUTO-ENTMCNC: 30.3 G/DL (ref 32–36)
MCV RBC AUTO: 67 FL (ref 82–98)
MCV RBC AUTO: 68 FL (ref 82–98)
MCV RBC AUTO: 68 FL (ref 82–98)
MCV RBC AUTO: 69 FL (ref 82–98)
MONOCYTES # BLD AUTO: 1 K/UL (ref 0.3–1)
MONOCYTES # BLD AUTO: 1.1 K/UL (ref 0.3–1)
MONOCYTES # BLD AUTO: 1.2 K/UL (ref 0.3–1)
MONOCYTES # BLD AUTO: 1.3 K/UL (ref 0.3–1)
MONOCYTES NFR BLD: 10.3 % (ref 4–15)
MONOCYTES NFR BLD: 11.1 % (ref 4–15)
MONOCYTES NFR BLD: 11.4 % (ref 4–15)
MONOCYTES NFR BLD: 11.9 % (ref 4–15)
NEUTROPHILS # BLD AUTO: 5.8 K/UL (ref 1.8–7.7)
NEUTROPHILS # BLD AUTO: 6 K/UL (ref 1.8–7.7)
NEUTROPHILS # BLD AUTO: 7.1 K/UL (ref 1.8–7.7)
NEUTROPHILS # BLD AUTO: 8.3 K/UL (ref 1.8–7.7)
NEUTROPHILS NFR BLD: 63.2 % (ref 38–73)
NEUTROPHILS NFR BLD: 63.4 % (ref 38–73)
NEUTROPHILS NFR BLD: 64.4 % (ref 38–73)
NEUTROPHILS NFR BLD: 68.9 % (ref 38–73)
NRBC BLD-RTO: 0 /100 WBC
NRBC BLD-RTO: 0 /100 WBC
NRBC BLD-RTO: 1 /100 WBC
NRBC BLD-RTO: 1 /100 WBC
OVALOCYTES BLD QL SMEAR: ABNORMAL
PLATELET # BLD AUTO: 332 K/UL (ref 150–350)
PLATELET # BLD AUTO: 345 K/UL (ref 150–350)
PLATELET # BLD AUTO: 356 K/UL (ref 150–350)
PLATELET # BLD AUTO: 375 K/UL (ref 150–350)
PLATELET BLD QL SMEAR: ABNORMAL
PLATELET BLD QL SMEAR: ABNORMAL
PMV BLD AUTO: 9.6 FL (ref 9.2–12.9)
PMV BLD AUTO: 9.7 FL (ref 9.2–12.9)
PMV BLD AUTO: 9.8 FL (ref 9.2–12.9)
PMV BLD AUTO: 9.9 FL (ref 9.2–12.9)
POIKILOCYTOSIS BLD QL SMEAR: SLIGHT
POIKILOCYTOSIS BLD QL SMEAR: SLIGHT
POLYCHROMASIA BLD QL SMEAR: ABNORMAL
POLYCHROMASIA BLD QL SMEAR: ABNORMAL
RBC # BLD AUTO: 4.13 M/UL (ref 4–5.4)
RBC # BLD AUTO: 4.34 M/UL (ref 4–5.4)
RBC # BLD AUTO: 4.58 M/UL (ref 4–5.4)
RBC # BLD AUTO: 5 M/UL (ref 4–5.4)
TARGETS BLD QL SMEAR: ABNORMAL
WBC # BLD AUTO: 11.08 K/UL (ref 3.9–12.7)
WBC # BLD AUTO: 12.1 K/UL (ref 3.9–12.7)
WBC # BLD AUTO: 9.12 K/UL (ref 3.9–12.7)
WBC # BLD AUTO: 9.55 K/UL (ref 3.9–12.7)
WBC #/AREA STL HPF: NORMAL /[HPF]

## 2020-11-08 PROCEDURE — 63600175 PHARM REV CODE 636 W HCPCS: Performed by: FAMILY MEDICINE

## 2020-11-08 PROCEDURE — 97165 OT EVAL LOW COMPLEX 30 MIN: CPT

## 2020-11-08 PROCEDURE — 99223 PR INITIAL HOSPITAL CARE,LEVL III: ICD-10-PCS | Mod: ,,, | Performed by: INTERNAL MEDICINE

## 2020-11-08 PROCEDURE — 97161 PT EVAL LOW COMPLEX 20 MIN: CPT

## 2020-11-08 PROCEDURE — 85025 COMPLETE CBC W/AUTO DIFF WBC: CPT | Mod: 91

## 2020-11-08 PROCEDURE — C9113 INJ PANTOPRAZOLE SODIUM, VIA: HCPCS | Performed by: FAMILY MEDICINE

## 2020-11-08 PROCEDURE — 97530 THERAPEUTIC ACTIVITIES: CPT

## 2020-11-08 PROCEDURE — 36415 COLL VENOUS BLD VENIPUNCTURE: CPT

## 2020-11-08 PROCEDURE — 25000003 PHARM REV CODE 250: Performed by: NURSE PRACTITIONER

## 2020-11-08 PROCEDURE — 99223 1ST HOSP IP/OBS HIGH 75: CPT | Mod: ,,, | Performed by: INTERNAL MEDICINE

## 2020-11-08 PROCEDURE — 25000003 PHARM REV CODE 250: Performed by: INTERNAL MEDICINE

## 2020-11-08 PROCEDURE — 11000001 HC ACUTE MED/SURG PRIVATE ROOM

## 2020-11-08 RX ORDER — POLYETHYLENE GLYCOL 3350 17 G/17G
17 POWDER, FOR SOLUTION ORAL ONCE
Status: COMPLETED | OUTPATIENT
Start: 2020-11-08 | End: 2020-11-08

## 2020-11-08 RX ORDER — POLYETHYLENE GLYCOL 3350, SODIUM SULFATE ANHYDROUS, SODIUM BICARBONATE, SODIUM CHLORIDE, POTASSIUM CHLORIDE 236; 22.74; 6.74; 5.86; 2.97 G/4L; G/4L; G/4L; G/4L; G/4L
4000 POWDER, FOR SOLUTION ORAL ONCE
Status: COMPLETED | OUTPATIENT
Start: 2020-11-08 | End: 2020-11-08

## 2020-11-08 RX ADMIN — PANTOPRAZOLE SODIUM 40 MG: 40 INJECTION, POWDER, LYOPHILIZED, FOR SOLUTION INTRAVENOUS at 11:11

## 2020-11-08 RX ADMIN — POLYETHYLENE GLYCOL 3350, SODIUM SULFATE ANHYDROUS, SODIUM BICARBONATE, SODIUM CHLORIDE, POTASSIUM CHLORIDE 4000 ML: 236; 22.74; 6.74; 5.86; 2.97 POWDER, FOR SOLUTION ORAL at 04:11

## 2020-11-08 RX ADMIN — PANTOPRAZOLE SODIUM 40 MG: 40 INJECTION, POWDER, LYOPHILIZED, FOR SOLUTION INTRAVENOUS at 08:11

## 2020-11-08 RX ADMIN — LATANOPROST 1 DROP: 50 SOLUTION OPHTHALMIC at 09:11

## 2020-11-08 RX ADMIN — POLYETHYLENE GLYCOL (3350) 17 G: 17 POWDER, FOR SOLUTION ORAL at 02:11

## 2020-11-08 RX ADMIN — SULFASALAZINE 500 MG: 500 TABLET ORAL at 11:11

## 2020-11-08 RX ADMIN — SULFASALAZINE 500 MG: 500 TABLET ORAL at 09:11

## 2020-11-08 NOTE — CONSULTS
[facility]  Gastroenterology  Consult Note    Patient Name: Katarina Rivera  MRN: [id number]  Admission Date: 11/7/2020  Hospital Length of Stay: 1 days  Code Status: Full Code   Attending Provider: Adriana Quinteros*   Consulting Provider: Lyle Wilburn MD  Primary Care Physician: Ethan Denton MD  Principal Problem:GI bleed    Inpatient consult to Gastroenterology  Consult performed by: Lyle Wilburn MD  Consult ordered by: Adriana Quinteros MD        Subjective:     HPI:  This is 85 y/o lady with longstanding UC who presented to outside ER for continued bloody diarrhea, assoc fatigue x 2 weeks.  GI consulted for rectal bleeding and colitis.  Ongoing rectal bleeding for at least 2 months.  Associated with loose stools and diarrhea.  No vomiting.  No abdominal pain.  She takes mesalamine product for her chronic ulcerative colitis.  Last colonoscopy was in 2015 and this was reviewed.  Previously followed by Dr. Sna.  No infectious type symptoms.  No fevers.  On chronic Xarelto for her pulmonary embolism.  Associated fatigue and lethargy.    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        Past Surgical History:   Procedure Laterality Date    APPENDECTOMY      COLONOSCOPY      COLONOSCOPY N/A 11/10/2015    Procedure: COLONOSCOPY;  Surgeon: Michael San MD;  Location: [facility] ([address] [place] [address]);  Service: Endoscopy;  Laterality: N/A;    hemorrhoidectomy      HYSTERECTOMY      OOPHORECTOMY      TOTAL KNEE ARTHROPLASTY      left        Review of patient's allergies indicates:  No Known Allergies  Family History     Problem Relation (Age of Onset)    Blindness Father    Stroke Mother        Tobacco Use    Smoking status: Never Smoker    Smokeless tobacco: Never Used   Substance and Sexual Activity    Alcohol use: No    Drug use: No    Sexual activity:  Not Currently     Review of Systems   Constitutional: Positive for activity change, appetite change and fatigue. Negative for chills and fever.   HENT: Negative for mouth sores and nosebleeds.    Eyes: Negative for pain and redness.   Respiratory: Negative for cough and shortness of breath.    Cardiovascular: Negative for chest pain and palpitations.   Gastrointestinal: Positive for anal bleeding, blood in stool and diarrhea. Negative for abdominal pain and vomiting.   Genitourinary: Negative for dysuria and hematuria.   Musculoskeletal: Negative for arthralgias and joint swelling.   Skin: Positive for color change and pallor.   Neurological: Positive for weakness. Negative for seizures and facial asymmetry.   Psychiatric/Behavioral: Negative for agitation and confusion.     Objective:     Vital Signs (Most Recent):  Temp: 97.1 °F (36.2 °C) (11/08/20 1310)  Pulse: 65 (11/08/20 1310)  Resp: 17 (11/08/20 1310)  BP: 129/82 (11/08/20 1310)  SpO2: (!) 94 % (11/08/20 1310) Vital Signs (24h Range):  Temp:  [97.1 °F (36.2 °C)-99.7 °F (37.6 °C)] 97.1 °F (36.2 °C)  Pulse:  [57-96] 65  Resp:  [16-18] 17  SpO2:  [94 %-99 %] 94 %  BP: (111-146)/(54-82) 129/82     Weight: 56.7 kg (125 lb) (11/08/20 0306)  Body mass index is 22.14 kg/m².      Intake/Output Summary (Last 24 hours) at 11/8/2020 1327  Last data filed at 11/7/2020 2013  Gross per 24 hour   Intake 60 ml   Output --   Net 60 ml       Lines/Drains/Airways     Peripheral Intravenous Line                 Peripheral IV - Single Lumen 11/07/20 1101 20 G;1 in Anterior;Left Hand 1 day                Physical Exam  Vitals signs reviewed.   Constitutional:       General: She is not in acute distress.     Appearance: She is not diaphoretic.   HENT:      Head: Normocephalic and atraumatic.   Eyes:      General: No scleral icterus.     Conjunctiva/sclera: Conjunctivae normal.   Neck:      Musculoskeletal: Neck supple.   Cardiovascular:      Rate and Rhythm: Normal rate.      Heart  sounds: Normal heart sounds.   Pulmonary:      Effort: Pulmonary effort is normal. No respiratory distress.      Breath sounds: Normal breath sounds. No stridor.   Abdominal:      General: There is no distension.      Palpations: Abdomen is soft. There is no mass.      Tenderness: There is no abdominal tenderness. There is no guarding or rebound.      Comments: Tympanic upper abd, soft   Musculoskeletal:         General: No tenderness or deformity.   Lymphadenopathy:      Cervical: No cervical adenopathy.   Skin:     General: Skin is warm and dry.      Coloration: Skin is pale.      Findings: No rash.   Neurological:      Mental Status: She is alert and oriented to person, place, and time.      Gait: Gait normal.      Comments: Motor movement of head and neck   Psychiatric:         Mood and Affect: Mood normal.         Behavior: Behavior normal.         Significant Labs:  Amylase: No results for input(s): AMYLASE in the last 48 hours.  Blood Culture:   Recent Labs   Lab 11/07/20  1126   LABBLOO No Growth to date  No Growth to date     CBC:   Recent Labs   Lab 11/08/20  0558 11/08/20  0830 11/08/20  1236   WBC 9.12 11.08 9.55   HGB 8.4* 9.2* 8.8*   HCT 27.7* 31.3* 29.5*    356* 332     CMP:   Recent Labs   Lab 11/07/20  1127   GLU 87   CALCIUM 8.7   ALBUMIN 3.3*   PROT 7.1      K 3.6   CO2 19*      BUN 21   CREATININE 1.3   ALKPHOS 82   ALT <5*   AST 13   BILITOT 0.5     Coagulation:   Recent Labs   Lab 11/07/20  1127   INR 1.1   APTT 26.6     CRP:   Recent Labs   Lab 11/07/20  1127   CRP 40.5*     ESR:   Recent Labs   Lab 11/07/20  1127   SEDRATE 14     H.Pylori Ab IgG: No results for input(s): HPYLORIIGG in the last 48 hours.  Lipase: No results for input(s): LIPASE in the last 48 hours.  Liver Function Test:   Recent Labs   Lab 11/07/20  1127   ALT <5*   AST 13   ALKPHOS 82   BILITOT 0.5   PROT 7.1   ALBUMIN 3.3*     Peritoneal Fluid Cultures: No results for input(s): AEROBICCULTU, LABGRAM in  the last 48 hours.  Stool C. diff: No results for input(s): CDIFFICILEAN, CDIFFTOX in the last 48 hours.  Stool Culture: No results for input(s): STOOLCULTURE in the last 48 hours.  Stool Ova/Cysts/Parasites: No results for input(s): STLEXAMOCP in the last 48 hours.  Stool Giardia/Crypto: No results for input(s): GIARDIAANTIG, CRSPAG in the last 48 hours.  Stool WBCs:   Recent Labs   Lab 11/07/20  1238   STOOLWBC No neutrophils seen     Stool Lactoferrin: No results for input(s): LACTOFERRINS in the last 48 hours.  All pertinent lab results from the last 24 hours have been reviewed.    Significant Imaging:  Imaging results within the past 24 hours have been reviewed.    Assessment/Plan:     * GI bleed  See above    History of ulcerative colitis  Complicated history of UC.  Follows with Dr. San.  Reviewed last colonoscopy 2015, showed fairly significant sigmoid stricture (required upper gi scope to traverse).  Also, there was apparently a large adenomatous polyp found. This was not removed. This is still present.  She saw CRS and it was recommended to have total colectomy, but she deferred.    Now with bloody diarrhea, but no other symptoms. Severe anemia, likely exacerbated by anticoag and ongoing uc and possible bleeding from polyp ? Or has this progressed to cancer?    Recs:  Follow up stool studies in progress  Colonoscopy tomorrow, higher than avg risk  Clear liquids today, npo past Mn  I will order bowel prep  Hold anticoag (ok to use lovenox if necessary)            Thank you for your consult. I will follow-up with patient. Please contact us if you have any additional questions.    Lyle Wilburn MD  Gastroenterology  [facility]

## 2020-11-08 NOTE — SUBJECTIVE & OBJECTIVE
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        Past Surgical History:   Procedure Laterality Date    APPENDECTOMY      COLONOSCOPY      COLONOSCOPY N/A 11/10/2015    Procedure: COLONOSCOPY;  Surgeon: Michael San MD;  Location: [facility] ([address] [place] [address]);  Service: Endoscopy;  Laterality: N/A;    hemorrhoidectomy      HYSTERECTOMY      OOPHORECTOMY      TOTAL KNEE ARTHROPLASTY      left        Review of patient's allergies indicates:  No Known Allergies    Current Facility-Administered Medications on File Prior to Encounter   Medication    [COMPLETED] iohexoL (OMNIPAQUE 350) injection 75 mL    [COMPLETED] sodium chloride 0.9% bolus 1,000 mL    [COMPLETED] sodium chloride 0.9% bolus 1,000 mL    [DISCONTINUED] 0.9%  NaCl infusion (for blood administration)     Current Outpatient Medications on File Prior to Encounter   Medication Sig    metoprolol tartrate (LOPRESSOR) 100 MG tablet TAKE ONE TABLET BY MOUTH TWICE A DAY (Patient taking differently: 100 mg once daily. )    rivaroxaban (XARELTO) 20 mg Tab Take 1 tablet (20 mg total) by mouth daily with dinner or evening meal.    sulfaSALAzine (AZULFIDINE) 500 mg Tab TAKE ONE TABLET BY MOUTH TWICE A DAY    atorvastatin (LIPITOR) 20 MG tablet Take 20 mg by mouth once daily.    furosemide (LASIX) 40 MG tablet TAKE ONE TABLET BY MOUTH EVERY DAY (Patient not taking: Reported on 2/13/2020)    iron-vit c-vit b12-folic acid (IRON-C PLUS) tablet Take 1 tablet by mouth once daily. (Patient not taking: Reported on 2/13/2020)    latanoprost 0.005 % ophthalmic solution Place 1 drop into both eyes every evening.     nitroGLYCERIN (NITROSTAT) 0.4 MG SL tablet Place 1 tablet (0.4 mg total) under the tongue every 5 (five) minutes as needed for Chest pain.    rivaroxaban (XARELTO) 15 mg (42)- 20 mg (9) tablet dose pack Take 1 tablet (15 mg total) by  mouth 2 (two) times daily with meals. for 21 days, then take one 20 mg tablet by mouth everyday thereafter     Family History     Problem Relation (Age of Onset)    Blindness Father    Stroke Mother        Tobacco Use    Smoking status: Never Smoker    Smokeless tobacco: Never Used   Substance and Sexual Activity    Alcohol use: No    Drug use: No    Sexual activity: Not Currently     Review of Systems   Constitutional: Negative for appetite change, chills and fever.   HENT: Negative for congestion, nosebleeds and trouble swallowing.    Eyes: Negative for pain, redness and visual disturbance.   Respiratory: Positive for shortness of breath. Negative for cough.    Cardiovascular: Negative for chest pain and palpitations.   Gastrointestinal: Positive for blood in stool and diarrhea. Negative for abdominal pain, nausea and vomiting.   Genitourinary: Negative for difficulty urinating, dysuria and hematuria.   Musculoskeletal: Negative for back pain and gait problem.   Skin: Negative for rash and wound.   Neurological: Positive for weakness. Negative for dizziness, syncope, light-headedness and headaches.   Psychiatric/Behavioral: Negative for confusion.     Objective:     Vital Signs (Most Recent):  Temp: 98.4 °F (36.9 °C) (11/07/20 1955)  Pulse: 61 (11/08/20 0000)  Resp: 18 (11/07/20 1955)  BP: 123/78 (11/07/20 1955)  SpO2: 96 % (11/07/20 1955) Vital Signs (24h Range):  Temp:  [98.4 °F (36.9 °C)-99.5 °F (37.5 °C)] 98.4 °F (36.9 °C)  Pulse:  [61-96] 61  Resp:  [16-18] 18  SpO2:  [96 %-99 %] 96 %  BP: (122-146)/(54-78) 123/78     Weight: 52.6 kg (115 lb 15.4 oz)  Body mass index is 20.54 kg/m².    Physical Exam  Constitutional:       General: She is not in acute distress.     Appearance: Normal appearance.   HENT:      Head: Normocephalic and atraumatic.      Mouth/Throat:      Mouth: Mucous membranes are moist.   Eyes:      Conjunctiva/sclera: Conjunctivae normal.   Neck:      Musculoskeletal: Neck supple.    Cardiovascular:      Rate and Rhythm: Normal rate and regular rhythm.      Pulses: Normal pulses.      Heart sounds: Murmur present.   Pulmonary:      Effort: Pulmonary effort is normal.      Breath sounds: Normal breath sounds.   Abdominal:      General: Bowel sounds are normal.      Palpations: Abdomen is soft.   Musculoskeletal: Normal range of motion.   Skin:     General: Skin is warm and dry.      Findings: Bruising present.   Neurological:      Mental Status: She is alert and oriented to person, place, and time.      Motor: Weakness and tremor present.   Psychiatric:         Mood and Affect: Mood normal.         Behavior: Behavior normal.         Thought Content: Thought content normal.             Significant Labs:   CBC:   Recent Labs   Lab 11/07/20  1127   WBC 11.44   HGB 5.7*   HCT 22.0*   *     CMP:   Recent Labs   Lab 11/07/20  1127      K 3.6      CO2 19*   GLU 87   BUN 21   CREATININE 1.3   CALCIUM 8.7   PROT 7.1   ALBUMIN 3.3*   BILITOT 0.5   ALKPHOS 82   AST 13   ALT <5*   ANIONGAP 13   EGFRNONAA 37*     Coagulation:   Recent Labs   Lab 11/07/20  1127   INR 1.1   APTT 26.6     Lactic Acid:   Recent Labs   Lab 11/07/20  1127   LACTATE 1.3     Magnesium:   Recent Labs   Lab 11/07/20  1127   MG 2.1     Troponin:   Recent Labs   Lab 11/07/20  1127   TROPONINI <0.006     TSH:   Recent Labs   Lab 11/07/20  1127   TSH 3.020     Urine Studies: No results for input(s): COLORU, APPEARANCEUA, PHUR, SPECGRAV, PROTEINUA, GLUCUA, KETONESU, BILIRUBINUA, OCCULTUA, NITRITE, UROBILINOGEN, LEUKOCYTESUR, RBCUA, WBCUA, BACTERIA, SQUAMEPITHEL, HYALINECASTS in the last 48 hours.    Invalid input(s): SAIRA    Significant Imaging: I have reviewed all pertinent imaging results/findings within the past 24 hours.

## 2020-11-08 NOTE — PT/OT/SLP EVAL
Occupational Therapy   Evaluation    Name: Katarina Rivera  MRN: [id number]  Admitting Diagnosis:  GI bleed    The primary encounter diagnosis was Ulcerative pancolitis with rectal bleeding. A diagnosis of GI bleed was also pertinent to this visit.    Recommendations:     Discharge Recommendations: (TBD)  Discharge Equipment Recommendations:  none  Barriers to discharge:  None    Assessment:     Katarina Rivera is a 86 y.o. female with a medical diagnosis of GI bleed.  She presents with Performance deficits affecting function: weakness, impaired endurance, impaired functional mobilty, impaired self care skills, decreased safety awareness, impaired coordination.      Rehab Prognosis: Good; patient would benefit from acute skilled OT services to address these deficits and reach maximum level of function.       Plan:     Patient to be seen 5 x/week to address the above listed problems via self-care/home management, therapeutic activities, therapeutic exercises  · Plan of Care Expires: 12/08/20  · Plan of Care Reviewed with: patient, daughter    Subjective     Chief Complaint: need to be cleaned up I had BM  Patient/Family Comments/goals: none    Occupational Profile:  Living Environment: lives with daughter in [facility] with no steps; tub/shower combo with grab bar.  Previous level of function: Indep-Noreen ADLS, ambulated with RW PRN, uses BSc, sits in bottom of tub to bathe, pt drives, daughter does her grocery shopping.  Roles and Routines: likes to go to casino  Equipment Used at Home:  walker, rolling, bedside commode, grab bar(has a wc but does not use)  Assistance upon Discharge: daughter    Pain/Comfort:  · Pain Rating 1: 0/10  · Pain Rating Post-Intervention 1: 0/10    Patients cultural, spiritual, Islam conflicts given the current situation: no    Objective:     Communicated with: nurse prior to session.  Patient found HOB elevated with peripheral IV, bed alarm, telemetry upon OT entry to room.    General Precautions:  Standard, fall   Orthopedic Precautions:N/A   Braces: N/A     Occupational Performance:    Bed Mobility:    ·  NT 2/2 pt waiting for cleanup from staff 2/2 incontinent in brief, bowel prep for Colonoscopy tomorrow    Functional Mobility/Transfers:  · Same as above    Activities of Daily Living:  · Feeding:  independence clear liquids, NPO after midnight for colonsocopy  · Grooming: setup in bed .  · Lower Body Dressing: independence donned/doffed socks in bed  · Toileting: prpping for colonscopy tomorrow, incontinent in brief, awaiting cleanup in bed from staff .    Cognitive/Visual Perceptual:  Cognitive/Psychosocial Skills:     -       Oriented to: Person, Place, Time and Situation   -       Follows Commands/attention:Follows two-step commands  -       Communication: dysarthria  -       Memory: No Deficits noted  -       Safety awareness/insight to disability: intact   -       Mood/Affect/Coping skills/emotional control: Appropriate to situation  Visual/Perceptual:      -Intact .    Physical Exam:  Balance:    -       NT  Dominant hand:    -       right  Upper Extremity Range of Motion:     -       Right Upper Extremity: WFL  -       Left Upper Extremity: WFL  Upper Extremity Strength:    -       Right Upper Extremity: WFL  -       Left Upper Extremity: WFL   Strength:    -       Right Upper Extremity: WFL  -       Left Upper Extremity: WFL  Neurological:    -       essential tremor head     AMPAC 6 Click ADL:  AMPAC Total Score: 19    Treatment & Education:  Education:  role of OT and POC, daughter and pt verbalized understanding      Patient left HOB elevated with all lines intact, call button in reach, bed alarm on, nurse notified and daughter present    GOALS:   Multidisciplinary Problems     Occupational Therapy Goals        Problem: Occupational Therapy Goal    Goal Priority Disciplines Outcome Interventions   Occupational Therapy Goal     OT, PT/OT Ongoing, Progressing    Description: Goals to be met  by: 11/29/2020    Patient will increase functional independence with ADLs by performing:    UE Dressing with [place].  LE Dressing with Modified [place].  Grooming while standing at sink with Modified [place].  Toileting from toilet with Modified [place] for hygiene and clothing management.   Toilet transfer to toilet with Modified [place].                     History:     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        Past Surgical History:   Procedure Laterality Date    APPENDECTOMY      COLONOSCOPY      COLONOSCOPY N/A 11/10/2015    Procedure: COLONOSCOPY;  Surgeon: Michael San MD;  Location: [facility] ([address] [place] [address]);  Service: Endoscopy;  Laterality: N/A;    hemorrhoidectomy      HYSTERECTOMY      OOPHORECTOMY      TOTAL KNEE ARTHROPLASTY      left        Time Tracking:     OT Date of Treatment: 11/08/20  OT Start Time: 1419  OT Stop Time: 1425  OT Total Time (min): 6 min    Billable Minutes:Evaluation 6  Total Time 6    Alisa Bell OT  11/8/2020

## 2020-11-08 NOTE — PLAN OF CARE
Problem: Physical Therapy Goal  Goal: Physical Therapy Goal  Description: Goals to be met by: 2020     Patient will increase functional independence with mobility by performin. Supine to sit with Modified [place]  2. Sit to stand transfer with Modified [place]  3. Bed to chair transfer with Modified [place] using Rolling Walker  4. Gait  x 150 feet with Modified [place] using Rolling Walker.   5. Increased functional strength to WFL for decreased fall risk.  6. Lower extremity exercise program x10 reps per handout, with independence    Outcome: Ongoing, Progressing     PT eval completed. Pt lives with her daughter who is able to assist her as needed however pt modified independent before admission.     Pt without DME needs

## 2020-11-08 NOTE — ASSESSMENT & PLAN NOTE
Bleeding for a month--Hgb 5.7 on arrival  On xarelto for hx PE, DVT  Hold xarelto   Got 2 units of PRBC   Monitor Hgb  Consult GI  protonix BID  NPO  Will likely need IVC filter

## 2020-11-08 NOTE — SUBJECTIVE & OBJECTIVE
Interval History: awake and alert, denies any melena stool,   Patient and family questions and concern addressed  S/p 2 U prbc. H/H stable  awaits GI  evals and rec's     Review of Systems   Constitutional: Negative for appetite change, chills and fever.   Respiratory: Negative for cough and shortness of breath.    Cardiovascular: Negative for chest pain and palpitations.   Gastrointestinal: Negative for abdominal pain, blood in stool, diarrhea, nausea and vomiting.   Genitourinary: Negative for difficulty urinating, dysuria and hematuria.   Musculoskeletal: Negative for back pain and gait problem.   Skin: Negative for rash and wound.   Neurological: Positive for weakness. Negative for dizziness, syncope, light-headedness and headaches.   Psychiatric/Behavioral: Negative for confusion.     Objective:     Vital Signs (Most Recent):  Temp: 97.5 °F (36.4 °C) (11/08/20 0818)  Pulse: 60 (11/08/20 0818)  Resp: 16 (11/08/20 0818)  BP: 111/68 (11/08/20 0818)  SpO2: 96 % (11/08/20 0818) Vital Signs (24h Range):  Temp:  [97.5 °F (36.4 °C)-99.7 °F (37.6 °C)] 97.5 °F (36.4 °C)  Pulse:  [57-96] 60  Resp:  [16-18] 16  SpO2:  [95 %-99 %] 96 %  BP: (111-146)/(54-78) 111/68     Weight: 56.7 kg (125 lb)  Body mass index is 22.14 kg/m².    Intake/Output Summary (Last 24 hours) at 11/8/2020 0909  Last data filed at 11/7/2020 2013  Gross per 24 hour   Intake 60 ml   Output --   Net 60 ml      Physical Exam  Constitutional:       General: She is not in acute distress.     Appearance: Normal appearance.   HENT:      Head: Normocephalic and atraumatic.   Neck:      Musculoskeletal: Neck supple.   Cardiovascular:      Rate and Rhythm: Normal rate and regular rhythm.      Pulses: Normal pulses.      Heart sounds: Murmur present.   Pulmonary:      Effort: Pulmonary effort is normal.      Breath sounds: Normal breath sounds.   Abdominal:      General: Bowel sounds are normal.      Palpations: Abdomen is soft.   Musculoskeletal: Normal range of  motion.   Skin:     General: Skin is warm and dry.   Neurological:      Mental Status: She is alert and oriented to person, place, and time.      Motor: Tremor present. No weakness.   Psychiatric:         Mood and Affect: Mood normal.         Behavior: Behavior normal.         Significant Labs:   ABGs: No results for input(s): PH, PCO2, HCO3, POCSATURATED, BE, TOTALHB, COHB, METHB, O2HB, POCFIO2 in the last 48 hours.  Blood Culture:   Recent Labs   Lab 11/07/20  1126   LABBLOO No Growth to date  No Growth to date     CBC:   Recent Labs   Lab 11/07/20  1127 11/08/20  0558 11/08/20  0830   WBC 11.44 9.12 11.08   HGB 5.7* 8.4* 9.2*   HCT 22.0* 27.7* 31.3*   * 345 356*     CMP:   Recent Labs   Lab 11/07/20  1127      K 3.6      CO2 19*   GLU 87   BUN 21   CREATININE 1.3   CALCIUM 8.7   PROT 7.1   ALBUMIN 3.3*   BILITOT 0.5   ALKPHOS 82   AST 13   ALT <5*   ANIONGAP 13   EGFRNONAA 37*     Lactic Acid:   Recent Labs   Lab 11/07/20  1127   LACTATE 1.3     Lipase: No results for input(s): LIPASE in the last 48 hours.  Magnesium:   Recent Labs   Lab 11/07/20  1127   MG 2.1     Prealbumin: No results for input(s): PREALBUMIN in the last 48 hours.  Respiratory Culture: No results for input(s): GSRESP, RESPIRATORYC in the last 48 hours.  Troponin:   Recent Labs   Lab 11/07/20  1127   TROPONINI <0.006     TSH:   Recent Labs   Lab 11/07/20  1127   TSH 3.020     Urine Culture: No results for input(s): LABURIN in the last 48 hours.  Urine Studies: No results for input(s): COLORU, APPEARANCEUA, PHUR, SPECGRAV, PROTEINUA, GLUCUA, KETONESU, BILIRUBINUA, OCCULTUA, NITRITE, UROBILINOGEN, LEUKOCYTESUR, RBCUA, WBCUA, BACTERIA, SQUAMEPITHEL, HYALINECASTS in the last 48 hours.    Invalid input(s): WRIGHTSUR    Significant Imaging: I have reviewed all pertinent imaging results/findings within the past 24 hours.

## 2020-11-08 NOTE — PLAN OF CARE
VN cued into room.  Pt resting comfortably in bed with call light and personal belongings within reach.  NADN.  Staff in room assisting patient.  Pt AAOx4, able to answer all admission questions reliably.  No family at bedside.  Pt reports no pain.  No other needs or complaints at this time.  Reminded pt to use call light as needed.  VN will continue to follow and be available as needed.

## 2020-11-08 NOTE — SUBJECTIVE & OBJECTIVE
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        Past Surgical History:   Procedure Laterality Date    APPENDECTOMY      COLONOSCOPY      COLONOSCOPY N/A 11/10/2015    Procedure: COLONOSCOPY;  Surgeon: Michael San MD;  Location: [address]);  Service: Endoscopy;  Laterality: N/A;    hemorrhoidectomy      HYSTERECTOMY      OOPHORECTOMY      TOTAL KNEE ARTHROPLASTY      left        Review of patient's allergies indicates:  No Known Allergies  Family History     Problem Relation (Age of Onset)    Blindness Father    Stroke Mother        Tobacco Use    Smoking status: Never Smoker    Smokeless tobacco: Never Used   Substance and Sexual Activity    Alcohol use: No    Drug use: No    Sexual activity: Not Currently     Review of Systems   Constitutional: Positive for activity change, appetite change and fatigue. Negative for chills and fever.   HENT: Negative for mouth sores and nosebleeds.    Eyes: Negative for pain and redness.   Respiratory: Negative for cough and shortness of breath.    Cardiovascular: Negative for chest pain and palpitations.   Gastrointestinal: Positive for anal bleeding, blood in stool and diarrhea. Negative for abdominal pain and vomiting.   Genitourinary: Negative for dysuria and hematuria.   Musculoskeletal: Negative for arthralgias and joint swelling.   Skin: Positive for color change and pallor.   Neurological: Positive for weakness. Negative for seizures and facial asymmetry.   Psychiatric/Behavioral: Negative for agitation and confusion.     Objective:     Vital Signs (Most Recent):  Temp: 97.1 °F (36.2 °C) (11/08/20 1310)  Pulse: 65 (11/08/20 1310)  Resp: 17 (11/08/20 1310)  BP: 129/82 (11/08/20 1310)  SpO2: (!) 94 % (11/08/20 1310) Vital Signs (24h Range):  Temp:  [97.1 °F (36.2 °C)-99.7 °F (37.6 °C)] 97.1 °F (36.2 °C)  Pulse:  [57-96]  65  Resp:  [16-18] 17  SpO2:  [94 %-99 %] 94 %  BP: (111-146)/(54-82) 129/82     Weight: 56.7 kg (125 lb) (11/08/20 0306)  Body mass index is 22.14 kg/m².      Intake/Output Summary (Last 24 hours) at 11/8/2020 1327  Last data filed at 11/7/2020 2013  Gross per 24 hour   Intake 60 ml   Output --   Net 60 ml       Lines/Drains/Airways     Peripheral Intravenous Line                 Peripheral IV - Single Lumen 11/07/20 1101 20 G;1 in Anterior;Left Hand 1 day                Physical Exam  Vitals signs reviewed.   Constitutional:       General: She is not in acute distress.     Appearance: She is not diaphoretic.   HENT:      Head: Normocephalic and atraumatic.   Eyes:      General: No scleral icterus.     Conjunctiva/sclera: Conjunctivae normal.   Neck:      Musculoskeletal: Neck supple.   Cardiovascular:      Rate and Rhythm: Normal rate.      Heart sounds: Normal heart sounds.   Pulmonary:      Effort: Pulmonary effort is normal. No respiratory distress.      Breath sounds: Normal breath sounds. No stridor.   Abdominal:      General: There is no distension.      Palpations: Abdomen is soft. There is no mass.      Tenderness: There is no abdominal tenderness. There is no guarding or rebound.      Comments: Tympanic upper abd, soft   Musculoskeletal:         General: No tenderness or deformity.   Lymphadenopathy:      Cervical: No cervical adenopathy.   Skin:     General: Skin is warm and dry.      Coloration: Skin is pale.      Findings: No rash.   Neurological:      Mental Status: She is alert and oriented to person, place, and time.      Gait: Gait normal.      Comments: Motor movement of head and neck   Psychiatric:         Mood and Affect: Mood normal.         Behavior: Behavior normal.         Significant Labs:  Amylase: No results for input(s): AMYLASE in the last 48 hours.  Blood Culture:   Recent Labs   Lab 11/07/20  1126   LABBLOO No Growth to date  No Growth to date     CBC:   Recent Labs   Lab  11/08/20  0558 11/08/20  0830 11/08/20  1236   WBC 9.12 11.08 9.55   HGB 8.4* 9.2* 8.8*   HCT 27.7* 31.3* 29.5*    356* 332     CMP:   Recent Labs   Lab 11/07/20  1127   GLU 87   CALCIUM 8.7   ALBUMIN 3.3*   PROT 7.1      K 3.6   CO2 19*      BUN 21   CREATININE 1.3   ALKPHOS 82   ALT <5*   AST 13   BILITOT 0.5     Coagulation:   Recent Labs   Lab 11/07/20  1127   INR 1.1   APTT 26.6     CRP:   Recent Labs   Lab 11/07/20  1127   CRP 40.5*     ESR:   Recent Labs   Lab 11/07/20  1127   SEDRATE 14     H.Pylori Ab IgG: No results for input(s): HPYLORIIGG in the last 48 hours.  Lipase: No results for input(s): LIPASE in the last 48 hours.  Liver Function Test:   Recent Labs   Lab 11/07/20  1127   ALT <5*   AST 13   ALKPHOS 82   BILITOT 0.5   PROT 7.1   ALBUMIN 3.3*     Peritoneal Fluid Cultures: No results for input(s): AEROBICCULTU, LABGRAM in the last 48 hours.  Stool C. diff: No results for input(s): CDIFFICILEAN, CDIFFTOX in the last 48 hours.  Stool Culture: No results for input(s): STOOLCULTURE in the last 48 hours.  Stool Ova/Cysts/Parasites: No results for input(s): STLEXAMOCP in the last 48 hours.  Stool Giardia/Crypto: No results for input(s): GIARDIAANTIG, CRSPAG in the last 48 hours.  Stool WBCs:   Recent Labs   Lab 11/07/20  1238   STOOLWBC No neutrophils seen     Stool Lactoferrin: No results for input(s): LACTOFERRINS in the last 48 hours.  All pertinent lab results from the last 24 hours have been reviewed.    Significant Imaging:  Imaging results within the past 24 hours have been reviewed.

## 2020-11-08 NOTE — HPI
Ms. Rivera is an 85 yo female with a pertinent past medical history of ulcerative colitis (stable for several years), bilateral PE and DVT 1/2019 (on xarelto) who presented [place] ED with bloody diarrhea. The patient states she has been seeing bright red blood in her stool for about a month but it got worse over the last few days. She endorses occasional SOB since the bleeding started. The patient reports she fell on Friday after her legs were weak and gave out. She is noted to have bruising to her forehead and face. No abdominal pain or vomiting. No chills, fevers, body aches. In the ED she was found to have bright red blood in her rectum. Her vitals were stable. Labs revealed Hb 5.7 and Hct 22. She received 2 units of PRBC. Her D Dimer was elevated at 4.55.  Stool studies were sent - rotavirus Ag negative; occult blood positive; stool WBC, O&P, giardia/crypto in process.  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 The patient was transferred and admitted to [facility] hospital medicine service for GI services.

## 2020-11-08 NOTE — ASSESSMENT & PLAN NOTE
Complicated history of UC.  Follows with Dr. San.  Reviewed last colonoscopy 2015, showed fairly significant sigmoid stricture (required upper gi scope to traverse).  Also, there was apparently a large adenomatous polyp found. This was not removed. This is still present.  She saw CRS and it was recommended to have total colectomy, but she deferred.    Now with bloody diarrhea, but no other symptoms. Severe anemia, likely exacerbated by anticoag and ongoing uc and possible bleeding from polyp ? Or has this progressed to cancer?    Recs:  Follow up stool studies in progress  Colonoscopy tomorrow, higher than avg risk  Clear liquids today, npo past Mn  I will order bowel prep  Hold anticoag (ok to use lovenox if necessary)

## 2020-11-08 NOTE — HPI
This is 85 y/o lady with longstanding UC who presented to outside ER for continued bloody diarrhea, assoc fatigue x 2 weeks.  GI consulted for rectal bleeding and colitis.  Ongoing rectal bleeding for at least 2 months.  Associated with loose stools and diarrhea.  No vomiting.  No abdominal pain.  She takes mesalamine product for her chronic ulcerative colitis.  Last colonoscopy was in 2015 and this was reviewed.  Previously followed by Dr. San.  No infectious type symptoms.  No fevers.  On chronic Xarelto for her pulmonary embolism.  Associated fatigue and lethargy.

## 2020-11-08 NOTE — PLAN OF CARE
Plan of care reviewed with patient, understanding verbalized. Pt remains  on tele. No complaints or acute distress noted. Instructed to call for any assistance, understanding verbalized. Bed alarm on, call light in reach, fall precautions in place. Will continue to monitor.

## 2020-11-08 NOTE — PLAN OF CARE
Problem: Occupational Therapy Goal  Goal: Occupational Therapy Goal  Description: Goals to be met by: 11/29/2020    Patient will increase functional independence with ADLs by performing:    UE Dressing with [place].  LE Dressing with Modified [place].  Grooming while standing at sink with Modified [place].  Toileting from toilet with Modified [place] for hygiene and clothing management.   Toilet transfer to toilet with Modified [place].    Outcome: Ongoing, Progressing   OT initial eval completed.  DC recs :  TBD.  Continue with OT POC.

## 2020-11-08 NOTE — H&P
[facility] Hospital Medicine  History & Physical    Patient Name: Katarina Rivera  MRN: [id number]  Admission Date: 11/7/2020  Attending Physician: Adriana Quinteros*   Primary Care Provider: Ethan Denton MD         Patient information was obtained from patient, past medical records and ER records.     Subjective:     Principal Problem:GI bleed    Chief Complaint: No chief complaint on file.       HPI: Ms. Rivera is an 85 yo female with a pertinent past medical history of ulcerative colitis (stable for several years), bilateral PE and DVT 1/2019 (on xarelto) who presented [place] ED with bloody diarrhea. The patient states she has been seeing bright red blood in her stool for about a month but it got worse over the last few days. She endorses occasional SOB since the bleeding started. The patient reports she fell on Friday after her legs were weak and gave out. She is noted to have bruising to her forehead and face. No abdominal pain or vomiting. No chills, fevers, body aches. In the ED she was found to have bright red blood in her rectum. Her vitals were stable. Labs revealed Hb 5.7 and Hct 22. She received 2 units of PRBC. Her D Dimer was elevated at 4.55.  Stool studies were sent - rotavirus Ag negative; occult blood positive; stool WBC, O&P, giardia/crypto in process.  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 The patient was transferred and admitted to [facility] hospital medicine service for GI services.       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        Past Surgical History:   Procedure Laterality Date    APPENDECTOMY      COLONOSCOPY      COLONOSCOPY N/A 11/10/2015    Procedure: COLONOSCOPY;  Surgeon: Michael San MD;  Location: [facility] ([address] [place] [address]);  Service: Endoscopy;  Laterality:  N/A;    hemorrhoidectomy      HYSTERECTOMY      OOPHORECTOMY      TOTAL KNEE ARTHROPLASTY      left        Review of patient's allergies indicates:  No Known Allergies    Current Facility-Administered Medications on File Prior to Encounter   Medication    [COMPLETED] iohexoL (OMNIPAQUE 350) injection 75 mL    [COMPLETED] sodium chloride 0.9% bolus 1,000 mL    [COMPLETED] sodium chloride 0.9% bolus 1,000 mL    [DISCONTINUED] 0.9%  NaCl infusion (for blood administration)     Current Outpatient Medications on File Prior to Encounter   Medication Sig    metoprolol tartrate (LOPRESSOR) 100 MG tablet TAKE ONE TABLET BY MOUTH TWICE A DAY (Patient taking differently: 100 mg once daily. )    rivaroxaban (XARELTO) 20 mg Tab Take 1 tablet (20 mg total) by mouth daily with dinner or evening meal.    sulfaSALAzine (AZULFIDINE) 500 mg Tab TAKE ONE TABLET BY MOUTH TWICE A DAY    atorvastatin (LIPITOR) 20 MG tablet Take 20 mg by mouth once daily.    furosemide (LASIX) 40 MG tablet TAKE ONE TABLET BY MOUTH EVERY DAY (Patient not taking: Reported on 2/13/2020)    iron-vit c-vit b12-folic acid (IRON-C PLUS) tablet Take 1 tablet by mouth once daily. (Patient not taking: Reported on 2/13/2020)    latanoprost 0.005 % ophthalmic solution Place 1 drop into both eyes every evening.     nitroGLYCERIN (NITROSTAT) 0.4 MG SL tablet Place 1 tablet (0.4 mg total) under the tongue every 5 (five) minutes as needed for Chest pain.    rivaroxaban (XARELTO) 15 mg (42)- 20 mg (9) tablet dose pack Take 1 tablet (15 mg total) by mouth 2 (two) times daily with meals. for 21 days, then take one 20 mg tablet by mouth everyday thereafter     Family History     Problem Relation (Age of Onset)    Blindness Father    Stroke Mother        Tobacco Use    Smoking status: Never Smoker    Smokeless tobacco: Never Used   Substance and Sexual Activity    Alcohol use: No    Drug use: No    Sexual activity: Not Currently     Review of Systems    Constitutional: Negative for appetite change, chills and fever.   HENT: Negative for congestion, nosebleeds and trouble swallowing.    Eyes: Negative for pain, redness and visual disturbance.   Respiratory: Positive for shortness of breath. Negative for cough.    Cardiovascular: Negative for chest pain and palpitations.   Gastrointestinal: Positive for blood in stool and diarrhea. Negative for abdominal pain, nausea and vomiting.   Genitourinary: Negative for difficulty urinating, dysuria and hematuria.   Musculoskeletal: Negative for back pain and gait problem.   Skin: Negative for rash and wound.   Neurological: Positive for weakness. Negative for dizziness, syncope, light-headedness and headaches.   Psychiatric/Behavioral: Negative for confusion.     Objective:     Vital Signs (Most Recent):  Temp: 98.4 °F (36.9 °C) (11/07/20 1955)  Pulse: 61 (11/08/20 0000)  Resp: 18 (11/07/20 1955)  BP: 123/78 (11/07/20 1955)  SpO2: 96 % (11/07/20 1955) Vital Signs (24h Range):  Temp:  [98.4 °F (36.9 °C)-99.5 °F (37.5 °C)] 98.4 °F (36.9 °C)  Pulse:  [61-96] 61  Resp:  [16-18] 18  SpO2:  [96 %-99 %] 96 %  BP: (122-146)/(54-78) 123/78     Weight: 52.6 kg (115 lb 15.4 oz)  Body mass index is 20.54 kg/m².    Physical Exam  Constitutional:       General: She is not in acute distress.     Appearance: Normal appearance.   HENT:      Head: Normocephalic and atraumatic.      Mouth/Throat:      Mouth: Mucous membranes are moist.   Eyes:      Conjunctiva/sclera: Conjunctivae normal.   Neck:      Musculoskeletal: Neck supple.   Cardiovascular:      Rate and Rhythm: Normal rate and regular rhythm.      Pulses: Normal pulses.      Heart sounds: Murmur present.   Pulmonary:      Effort: Pulmonary effort is normal.      Breath sounds: Normal breath sounds.   Abdominal:      General: Bowel sounds are normal.      Palpations: Abdomen is soft.   Musculoskeletal: Normal range of motion.   Skin:     General: Skin is warm and dry.      Findings:  Bruising present.   Neurological:      Mental Status: She is alert and oriented to person, place, and time.      Motor: Weakness and tremor present.   Psychiatric:         Mood and Affect: Mood normal.         Behavior: Behavior normal.         Thought Content: Thought content normal.             Significant Labs:   CBC:   Recent Labs   Lab 11/07/20  1127   WBC 11.44   HGB 5.7*   HCT 22.0*   *     CMP:   Recent Labs   Lab 11/07/20  1127      K 3.6      CO2 19*   GLU 87   BUN 21   CREATININE 1.3   CALCIUM 8.7   PROT 7.1   ALBUMIN 3.3*   BILITOT 0.5   ALKPHOS 82   AST 13   ALT <5*   ANIONGAP 13   EGFRNONAA 37*     Coagulation:   Recent Labs   Lab 11/07/20  1127   INR 1.1   APTT 26.6     Lactic Acid:   Recent Labs   Lab 11/07/20  1127   LACTATE 1.3     Magnesium:   Recent Labs   Lab 11/07/20  1127   MG 2.1     Troponin:   Recent Labs   Lab 11/07/20  1127   TROPONINI <0.006     TSH:   Recent Labs   Lab 11/07/20  1127   TSH 3.020     Urine Studies: No results for input(s): COLORU, APPEARANCEUA, PHUR, SPECGRAV, PROTEINUA, GLUCUA, KETONESU, BILIRUBINUA, OCCULTUA, NITRITE, UROBILINOGEN, LEUKOCYTESUR, RBCUA, WBCUA, BACTERIA, SQUAMEPITHEL, HYALINECASTS in the last 48 hours.    Invalid input(s): SAIRA    Significant Imaging: I have reviewed all pertinent imaging results/findings within the past 24 hours.    Assessment/Plan:     * GI bleed  Bleeding for a month--Hgb 5.7 on arrival  On xarelto for hx PE, DVT  Hold xarelto   Got 2 units of PRBC   Monitor Hgb  Consult GI  protonix BID  NPO  Will likely need IVC filter      Weakness  Secondary to anemia  Patient is normally independent, still drives  Consult PT/OT      History of pulmonary embolism  History of DVT (deep vein thrombosis)    1/2019  Bilateral  Hospitalized here at the time      Tremor  Chronic, hereditary      Ulcerative colitis  Chronic--25 years  Has been stable for years  Cont sulfasalazine        VTE Risk Mitigation (From admission, onward)          Ordered     IP VTE HIGH RISK PATIENT  Once      11/07/20 1832     Place sequential compression device  Until discontinued      11/07/20 1832                   Terra Nunez NP  Department of Hospital Medicine   [facility]

## 2020-11-08 NOTE — PLAN OF CARE
Patient arrived via stretcher with gina. Vital signs stable at this time. Blood transfusion continued @150ml/hr. Bed locked and low, call light within reach, bed alarm on, non skid socks applied, fall risk band applied, side rails x2. Instructed to call if needing assistance. Will continue to monitor

## 2020-11-08 NOTE — PLAN OF CARE
Problem: Adult Inpatient Plan of Care  Goal: Plan of Care Review  Outcome: Ongoing, Progressing  Chart and care plan reviewed

## 2020-11-09 ENCOUNTER — ANESTHESIA EVENT (OUTPATIENT)
Dept: ENDOSCOPY | Facility: HOSPITAL | Age: 85
DRG: 386 | End: 2020-11-09
Payer: MEDICARE

## 2020-11-09 ENCOUNTER — TELEPHONE (OUTPATIENT)
Dept: GASTROENTEROLOGY | Facility: HOSPITAL | Age: 85
End: 2020-11-09

## 2020-11-09 ENCOUNTER — ANESTHESIA (OUTPATIENT)
Dept: ENDOSCOPY | Facility: HOSPITAL | Age: 85
DRG: 386 | End: 2020-11-09
Payer: MEDICARE

## 2020-11-09 LAB
BASOPHILS # BLD AUTO: 0.06 K/UL (ref 0–0.2)
BASOPHILS NFR BLD: 0.7 % (ref 0–1.9)
DIFFERENTIAL METHOD: ABNORMAL
E COLI SXT1 STL QL IA: NEGATIVE
E COLI SXT2 STL QL IA: NEGATIVE
EOSINOPHIL # BLD AUTO: 0.1 K/UL (ref 0–0.5)
EOSINOPHIL NFR BLD: 1.3 % (ref 0–8)
ERYTHROCYTE [DISTWIDTH] IN BLOOD BY AUTOMATED COUNT: 30.8 % (ref 11.5–14.5)
HCT VFR BLD AUTO: 30.7 % (ref 37–48.5)
HGB BLD-MCNC: 9.1 G/DL (ref 12–16)
IMM GRANULOCYTES # BLD AUTO: 0.13 K/UL (ref 0–0.04)
IMM GRANULOCYTES NFR BLD AUTO: 1.5 % (ref 0–0.5)
LYMPHOCYTES # BLD AUTO: 1.7 K/UL (ref 1–4.8)
LYMPHOCYTES NFR BLD: 19.5 % (ref 18–48)
MCH RBC QN AUTO: 20.3 PG (ref 27–31)
MCHC RBC AUTO-ENTMCNC: 29.6 G/DL (ref 32–36)
MCV RBC AUTO: 68 FL (ref 82–98)
MONOCYTES # BLD AUTO: 0.9 K/UL (ref 0.3–1)
MONOCYTES NFR BLD: 10.8 % (ref 4–15)
NEUTROPHILS # BLD AUTO: 5.7 K/UL (ref 1.8–7.7)
NEUTROPHILS NFR BLD: 66.2 % (ref 38–73)
NRBC BLD-RTO: 0 /100 WBC
PLATELET # BLD AUTO: 368 K/UL (ref 150–350)
PMV BLD AUTO: 10.2 FL (ref 9.2–12.9)
RBC # BLD AUTO: 4.49 M/UL (ref 4–5.4)
WBC # BLD AUTO: 8.63 K/UL (ref 3.9–12.7)

## 2020-11-09 PROCEDURE — 85025 COMPLETE CBC W/AUTO DIFF WBC: CPT

## 2020-11-09 PROCEDURE — 97110 THERAPEUTIC EXERCISES: CPT | Mod: CQ

## 2020-11-09 PROCEDURE — 36415 COLL VENOUS BLD VENIPUNCTURE: CPT

## 2020-11-09 PROCEDURE — 88305 TISSUE EXAM BY PATHOLOGIST: ICD-10-PCS | Mod: 26,,, | Performed by: PATHOLOGY

## 2020-11-09 PROCEDURE — 63600175 PHARM REV CODE 636 W HCPCS: Performed by: NURSE ANESTHETIST, CERTIFIED REGISTERED

## 2020-11-09 PROCEDURE — 88342 IMHCHEM/IMCYTCHM 1ST ANTB: CPT | Performed by: PATHOLOGY

## 2020-11-09 PROCEDURE — 88341 IMHCHEM/IMCYTCHM EA ADD ANTB: CPT | Mod: 26,,, | Performed by: PATHOLOGY

## 2020-11-09 PROCEDURE — 88341 PR IHC OR ICC EACH ADD'L SINGLE ANTIBODY  STAINPR: ICD-10-PCS | Mod: 26,,, | Performed by: PATHOLOGY

## 2020-11-09 PROCEDURE — 37000009 HC ANESTHESIA EA ADD 15 MINS: Performed by: INTERNAL MEDICINE

## 2020-11-09 PROCEDURE — 63600175 PHARM REV CODE 636 W HCPCS: Performed by: FAMILY MEDICINE

## 2020-11-09 PROCEDURE — 45380 COLONOSCOPY AND BIOPSY: CPT | Performed by: INTERNAL MEDICINE

## 2020-11-09 PROCEDURE — C9113 INJ PANTOPRAZOLE SODIUM, VIA: HCPCS | Performed by: FAMILY MEDICINE

## 2020-11-09 PROCEDURE — 88341 IMHCHEM/IMCYTCHM EA ADD ANTB: CPT | Performed by: PATHOLOGY

## 2020-11-09 PROCEDURE — 45380 COLONOSCOPY AND BIOPSY: CPT | Mod: ,,, | Performed by: INTERNAL MEDICINE

## 2020-11-09 PROCEDURE — 37000008 HC ANESTHESIA 1ST 15 MINUTES: Performed by: INTERNAL MEDICINE

## 2020-11-09 PROCEDURE — 88305 TISSUE EXAM BY PATHOLOGIST: CPT | Mod: 26,,, | Performed by: PATHOLOGY

## 2020-11-09 PROCEDURE — 25000003 PHARM REV CODE 250: Performed by: NURSE PRACTITIONER

## 2020-11-09 PROCEDURE — 25000003 PHARM REV CODE 250: Performed by: NURSE ANESTHETIST, CERTIFIED REGISTERED

## 2020-11-09 PROCEDURE — 97530 THERAPEUTIC ACTIVITIES: CPT | Mod: CQ

## 2020-11-09 PROCEDURE — 88342 CHG IMMUNOCYTOCHEMISTRY: ICD-10-PCS | Mod: 26,,, | Performed by: PATHOLOGY

## 2020-11-09 PROCEDURE — 88305 TISSUE EXAM BY PATHOLOGIST: CPT | Performed by: PATHOLOGY

## 2020-11-09 PROCEDURE — 27201012 HC FORCEPS, HOT/COLD, DISP: Performed by: INTERNAL MEDICINE

## 2020-11-09 PROCEDURE — 88342 IMHCHEM/IMCYTCHM 1ST ANTB: CPT | Mod: 26,,, | Performed by: PATHOLOGY

## 2020-11-09 PROCEDURE — 45380 PR COLONOSCOPY,BIOPSY: ICD-10-PCS | Mod: ,,, | Performed by: INTERNAL MEDICINE

## 2020-11-09 PROCEDURE — 11000001 HC ACUTE MED/SURG PRIVATE ROOM

## 2020-11-09 RX ORDER — PROPOFOL 10 MG/ML
VIAL (ML) INTRAVENOUS CONTINUOUS PRN
Status: DISCONTINUED | OUTPATIENT
Start: 2020-11-09 | End: 2020-11-09

## 2020-11-09 RX ORDER — LIDOCAINE HCL/PF 100 MG/5ML
SYRINGE (ML) INTRAVENOUS
Status: DISCONTINUED | OUTPATIENT
Start: 2020-11-09 | End: 2020-11-09

## 2020-11-09 RX ORDER — SODIUM CHLORIDE 9 MG/ML
INJECTION, SOLUTION INTRAVENOUS CONTINUOUS PRN
Status: DISCONTINUED | OUTPATIENT
Start: 2020-11-09 | End: 2020-11-09

## 2020-11-09 RX ORDER — PROPOFOL 10 MG/ML
VIAL (ML) INTRAVENOUS
Status: DISCONTINUED | OUTPATIENT
Start: 2020-11-09 | End: 2020-11-09

## 2020-11-09 RX ADMIN — PANTOPRAZOLE SODIUM 40 MG: 40 INJECTION, POWDER, LYOPHILIZED, FOR SOLUTION INTRAVENOUS at 08:11

## 2020-11-09 RX ADMIN — LIDOCAINE HYDROCHLORIDE 50 MG: 20 INJECTION, SOLUTION INTRAVENOUS at 02:11

## 2020-11-09 RX ADMIN — SODIUM CHLORIDE: 0.9 INJECTION, SOLUTION INTRAVENOUS at 02:11

## 2020-11-09 RX ADMIN — SULFASALAZINE 500 MG: 500 TABLET ORAL at 08:11

## 2020-11-09 RX ADMIN — LATANOPROST 1 DROP: 50 SOLUTION OPHTHALMIC at 08:11

## 2020-11-09 RX ADMIN — PROPOFOL 40 MG: 10 INJECTION, EMULSION INTRAVENOUS at 02:11

## 2020-11-09 RX ADMIN — PROPOFOL 75 MCG/KG/MIN: 10 INJECTION, EMULSION INTRAVENOUS at 02:11

## 2020-11-09 NOTE — OR NURSING
Patient scheduled for EGD/Colon today w/Dr. Jacob. Chart reviewed and report taken from Alem. Continues to drink bowel prep. IV access available.. [place] both ears.

## 2020-11-09 NOTE — PLAN OF CARE
Problem: Physical Therapy Goal  Goal: Physical Therapy Goal  Description: Goals to be met by: 2020     Patient will increase functional independence with mobility by performin. Supine to sit with Modified [place]  2. Sit to stand transfer with Modified [place]  3. Bed to chair transfer with Modified [place] using Rolling Walker  4. Gait  x 150 feet with Modified [place] using Rolling Walker.   5. Increased functional strength to WFL for decreased fall risk.  6. Lower extremity exercise program x10 reps per handout, with independence    Outcome: Ongoing, Progressing

## 2020-11-09 NOTE — HOSPITAL COURSE
Ms. Rivera presented with hematochezia to [facility]. Transferred to [facility] for GI service. Her hemoglobin was 5.7 on arrival. She was admitted for GI bleed, and was transfused 2U PRBC, started on PPI and Xarelto held. She improved some with holding Xarelto alone. A colonoscopy was performed which showed severe (Daley Score 3) pancolitis, ulcerative colitis s/p biopsy of polypoid lesion in the sigmoid colon, caecum. Suspected large and growing and oozing inflammatory polyp on 11/9/20.  It was recommended in the past that the patient undergo total abdominal colectomy, and that it would be the first choice of next step, as these lesions will be very difficult to perform surveillance on. She was continued on sulfasalazine for her UC. GI cleared patient to resume anticoagulation with Xarelto. Extensive done with patient and daughter at the bedside on multiple occasion on the risk/benefits of resuming Xarelto, and it was finally determined that she would resume Xarelto for her PE/DVT and be monitored overnight in the hospital to assess for bleeding. Patient had no further bleeding and her H/H was stable with Hgb of 9.5 on day of discharge. Patient to follow up with General Surgery to discuss option of total colectomy for her UC and then follow up with GI, and her PCP. She was seen by therapy services and arranged for home health with PT/OT upon discharge.

## 2020-11-09 NOTE — PLAN OF CARE
Plan of care reviewed with patient, understanding verbalized. Pt remains  on tele. No complaints or acute distress noted. Encouraged pt to drink  Golytely throughout shift. Having light brown loose stools. NPO.  Instructed to call for any assistance, understanding verbalized.  Bed alarm on, call light in reach, fall precautions in place. Will continue to monitor.

## 2020-11-09 NOTE — PT/OT/SLP PROGRESS
Physical Therapy Treatment    Patient Name:  Katarina Rivera   MRN:  [id number]    Recommendations:     Discharge Recommendations:  home health PT   Discharge Equipment Recommendations: none   Barriers to discharge: decreased mobility,strength and endurance    Assessment:     Katarina Rivera is a 86 y.o. female admitted with a medical diagnosis of GI bleed.  She presents with the following impairments/functional limitations:  weakness, impaired endurance, impaired functional mobilty, gait instability, impaired balance, decreased ROM, impaired coordination,pt with good participation and requires assistance with all mobility,pt will benefit from  services upon discharge.    Rehab Prognosis: Good; patient would benefit from acute skilled PT services to address these deficits and reach maximum level of function.    Recent Surgery: Procedure(s) (LRB):  COLONOSCOPY (N/A) Day of Surgery    Plan:     During this hospitalization, patient to be seen 4 x/week to address the identified rehab impairments via gait training, therapeutic activities, therapeutic exercises, neuromuscular re-education and progress toward the following goals:    · Plan of Care Expires:  12/08/20    Subjective     Chief Complaint: n/a  Patient/Family Comments/goals: pt states she was soiled.  Pain/Comfort:  · Pain Rating 1: (no c/o's)      Objective:     Communicated with nsg prior to session.  Patient found supine with bed alarm, peripheral IV, telemetry upon PT entry to room.     General Precautions: Standard, fall   Orthopedic Precautions:N/A   Braces: N/A, Aspen collar     Functional Mobility:  · Bed Mobility:     · Supine to Sit: supervision  · Sit to Supine: stand by assistance  · Transfers:     · Sit to Stand:  stand by assistance with no AD  · Balance: fair standing balance      AM-PAC 6 CLICK MOBILITY  Turning over in bed (including adjusting bedclothes, sheets and blankets)?: 4  Sitting down on and standing up from a chair with arms (e.g., wheelchair,  bedside commode, etc.): 3  Moving from lying on back to sitting on the side of the bed?: 4  Moving to and from a bed to a chair (including a wheelchair)?: 3  Need to walk in hospital room?: 3  Climbing 3-5 steps with a railing?: 2  Basic Mobility Total Score: 19       Therapeutic Activities and Exercises: le supine ex's X 10-12 reps inc: ap,qs,hs,abd/add,slr,initiated gait and pt soiled herself,pt supine.       Patient left supine with all lines intact, call button in reach, bed alarm on, nsg and pct notified and daughter present..    GOALS:   Multidisciplinary Problems     Physical Therapy Goals        Problem: Physical Therapy Goal    Goal Priority Disciplines Outcome Goal Variances Interventions   Physical Therapy Goal     PT, PT/OT Ongoing, Progressing     Description: Goals to be met by: 2020     Patient will increase functional independence with mobility by performin. Supine to sit with Modified [place]  2. Sit to stand transfer with Modified [place]  3. Bed to chair transfer with Modified [place] using Rolling Walker  4. Gait  x 150 feet with Modified [place] using Rolling Walker.   5. Increased functional strength to WFL for decreased fall risk.  6. Lower extremity exercise program x10 reps per handout, with independence                     Time Tracking:     PT Received On: 20  PT Start Time: 1019     PT Stop Time: 1042  PT Total Time (min): 23 min     Billable Minutes: Therapeutic Activity 13 and Therapeutic Exercise 10    Treatment Type: Treatment  PT/PTA: PTA     PTA Visit Number: 1     Eze Orellana, PTA  2020

## 2020-11-09 NOTE — NURSING
Plan of care discussed with pt & daughter. AAOx4, tremors noted. Some difficulty speaking clearly due to tremors. Pt drinking Golytely for colonoscopy prep. 1/3 of the bottle remaining. Pt states she cannot drink anymore. Stool liquid, yellow in color. Endo RN aware. Ok to stop prep. Multiple BMs. Redness noted on sacral area, barrier cream applied. NPO except meds. Pt able to swallow meds whole. Transported off floor via stretcher at 1345.

## 2020-11-09 NOTE — ANESTHESIA PREPROCEDURE EVALUATION
11/09/2020  Katarina Rivera is a 86 y.o., female.  Patient Active Problem List   Diagnosis    Glaucoma (increased eye pressure)    Ulcerative colitis    Osteoarthritis    Tremor    ALLERGIC RHINITIS    Cataract NEC    Pre-operative clearance    Ulcerative colitis, chronic    Dental abscess    NSTEMI (non-ST elevated myocardial infarction)    SOB (shortness of breath)    Atrial flutter    Leukocytosis    Pneumonia    Diastolic dysfunction    Elevated LFTs    Chest pain    History of pulmonary embolism    History of DVT (deep vein thrombosis)    History of ulcerative colitis    Sore throat    Cough    GI bleed    Weakness       Anesthesia Evaluation       I have reviewed the Medications.     Review of Systems  Anesthesia Hx:  Denies Family Hx of Anesthesia complications.   Social:  Non-Smoker, No Alcohol Use    Cardiovascular:   Past MI     Pulmonary:   Pneumonia Shortness of breath    Hepatic/GI:   PUD,    Musculoskeletal:   Arthritis         Physical Exam  General:  Well nourished    Airway/Jaw/Neck:  Airway Findings: Mouth Opening: Normal Tongue: Normal  General Airway Assessment: Adult  Mallampati: II      Dental:  Dental Findings: In tact   Chest/Lungs:  Chest/Lungs Findings: Clear to auscultation, Normal Respiratory Rate     Heart/Vascular:  Heart Findings: Rate: Normal  Rhythm: Regular Rhythm        Mental Status:  Mental Status Findings:  Cooperative, Alert and Oriented         Anesthesia Plan  Type of Anesthesia, risks & benefits discussed:  Anesthesia Type:  MAC  Patient's Preference: MAC  Intra-op Monitoring Plan:   Intra-op Monitoring Plan Comments:   Post Op Pain Control Plan:   Post Op Pain Control Plan Comments:   Induction:   IV  Beta Blocker:  Patient is not currently on a Beta-Blocker (No further documentation required).       Informed Consent: Patient understands risks  and agrees with Anesthesia plan.  Questions answered. Anesthesia consent signed with patient.  ASA Score: 3     Day of Surgery Review of History & Physical:            Ready For Surgery From Anesthesia Perspective.

## 2020-11-09 NOTE — PLAN OF CARE
Recovery complete. Patient recovered to baseline. VSS.   Dr. Wilburn visited at bedside, discussed findings and recommendations with patient and family member; all questions asked and answered. Verbalized understanding. Report called and given to Elen

## 2020-11-09 NOTE — SUBJECTIVE & OBJECTIVE
Interval History: awake and alert, denies any melena stool,   H/H stable plan for EGD today.       Review of Systems   Constitutional: Negative for appetite change, chills and fever.   Respiratory: Negative for cough and shortness of breath.    Cardiovascular: Negative for chest pain and palpitations.   Gastrointestinal: Negative for abdominal pain, blood in stool, diarrhea, nausea and vomiting.   Genitourinary: Negative for difficulty urinating, dysuria and hematuria.   Musculoskeletal: Negative for back pain and gait problem.   Skin: Negative for rash and wound.   Neurological: Positive for weakness. Negative for dizziness, syncope, light-headedness and headaches.   Psychiatric/Behavioral: Negative for confusion.     Objective:     Vital Signs (Most Recent):  Temp: 97.2 °F (36.2 °C) (11/09/20 1635)  Pulse: 75 (11/09/20 1635)  Resp: 17 (11/09/20 1635)  BP: (!) 152/68 (11/09/20 1635)  SpO2: 96 % (11/09/20 1635) Vital Signs (24h Range):  Temp:  [97.2 °F (36.2 °C)-98.8 °F (37.1 °C)] 97.2 °F (36.2 °C)  Pulse:  [59-82] 75  Resp:  [16-20] 17  SpO2:  [94 %-100 %] 96 %  BP: (120-153)/(44-80) 152/68     Weight: 56.7 kg (125 lb)  Body mass index is 22.14 kg/m².    Intake/Output Summary (Last 24 hours) at 11/9/2020 1710  Last data filed at 11/9/2020 1458  Gross per 24 hour   Intake 450 ml   Output --   Net 450 ml      Physical Exam  Constitutional:       General: She is not in acute distress.     Appearance: Normal appearance.   HENT:      Head: Normocephalic and atraumatic.   Neck:      Musculoskeletal: Neck supple.   Cardiovascular:      Rate and Rhythm: Normal rate and regular rhythm.      Pulses: Normal pulses.      Heart sounds: Murmur present.   Pulmonary:      Effort: Pulmonary effort is normal.      Breath sounds: Normal breath sounds.   Abdominal:      General: Bowel sounds are normal.      Palpations: Abdomen is soft.   Musculoskeletal: Normal range of motion.   Skin:     General: Skin is warm and dry.    Neurological:      Mental Status: She is alert and oriented to person, place, and time.      Motor: Tremor present. No weakness.   Psychiatric:         Mood and Affect: Mood normal.         Behavior: Behavior normal.         Significant Labs:   ABGs: No results for input(s): PH, PCO2, HCO3, POCSATURATED, BE, TOTALHB, COHB, METHB, O2HB, POCFIO2 in the last 48 hours.  Blood Culture:   No results for input(s): LABBLOO in the last 48 hours.  CBC:   Recent Labs   Lab 11/08/20  1236 11/08/20  1812 11/09/20  0547   WBC 9.55 12.10 8.63   HGB 8.8* 10.3* 9.1*   HCT 29.5* 34.4* 30.7*    375* 368*     CMP:   No results for input(s): NA, K, CL, CO2, GLU, BUN, CREATININE, CALCIUM, PROT, ALBUMIN, BILITOT, ALKPHOS, AST, ALT, ANIONGAP, EGFRNONAA in the last 48 hours.    Invalid input(s): ESTGFAFRICA  Lactic Acid:   No results for input(s): LACTATE in the last 48 hours.  Lipase: No results for input(s): LIPASE in the last 48 hours.  Magnesium:   No results for input(s): MG in the last 48 hours.  Prealbumin: No results for input(s): PREALBUMIN in the last 48 hours.  Respiratory Culture: No results for input(s): GSRESP, RESPIRATORYC in the last 48 hours.  Troponin:   No results for input(s): TROPONINI in the last 48 hours.  TSH:   Recent Labs   Lab 11/07/20  1127   TSH 3.020     Urine Culture: No results for input(s): LABURIN in the last 48 hours.  Urine Studies: No results for input(s): COLORU, APPEARANCEUA, PHUR, SPECGRAV, PROTEINUA, GLUCUA, KETONESU, BILIRUBINUA, OCCULTUA, NITRITE, UROBILINOGEN, LEUKOCYTESUR, RBCUA, WBCUA, BACTERIA, SQUAMEPITHEL, HYALINECASTS in the last 48 hours.    Invalid input(s): WRIGHTSUR    Significant Imaging: I have reviewed all pertinent imaging results/findings within the past 24 hours.

## 2020-11-09 NOTE — ANESTHESIA POSTPROCEDURE EVALUATION
Anesthesia Post Evaluation    Patient: Katarina Rivera    Procedure(s) Performed: Procedure(s) (LRB):  COLONOSCOPY (N/A)    Final Anesthesia Type: MAC    Patient location during evaluation: GI PACU  Patient participation: Yes- Able to Participate  Level of consciousness: awake and alert  Post-procedure vital signs: reviewed and stable  Pain management: adequate  Airway patency: patent    PONV status at discharge: No PONV  Anesthetic complications: no      Cardiovascular status: blood pressure returned to baseline and hemodynamically stable  Respiratory status: spontaneous ventilation  Hydration status: euvolemic  Follow-up not needed.          Vitals Value Taken Time   /70 11/09/20 1509   Temp 37 11/09/20 1509   Pulse 82 11/09/20 1509   Resp 18 11/09/20 1509   SpO2 100 11/09/20 1509         No case tracking events are documented in the log.      Pain/Eugenia Score: No data recorded

## 2020-11-09 NOTE — PROGRESS NOTES
[facility] Hospital Medicine  Progress Note    Patient Name: Katarina Rivera  MRN: [id number]  Patient Class: IP- Inpatient   Admission Date: 11/7/2020  Length of Stay: 2 days  Attending Physician: Adriana Quinteros*  Primary Care Provider: Ethan Denton MD        Subjective:     Principal Problem:GI bleed        HPI:  Ms. Rivera is an 87 yo female with a pertinent past medical history of ulcerative colitis (stable for several years), bilateral PE and DVT 1/2019 (on xarelto) who presented [place] ED with bloody diarrhea. The patient states she has been seeing bright red blood in her stool for about a month but it got worse over the last few days. She endorses occasional SOB since the bleeding started. The patient reports she fell on Friday after her legs were weak and gave out. She is noted to have bruising to her forehead and face. No abdominal pain or vomiting. No chills, fevers, body aches. In the ED she was found to have bright red blood in her rectum. Her vitals were stable. Labs revealed Hb 5.7 and Hct 22. She received 2 units of PRBC. Her D Dimer was elevated at 4.55.  Stool studies were sent - rotavirus Ag negative; occult blood positive; stool WBC, O&P, giardia/crypto in process.  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 The patient was transferred and admitted to [facility] hospital medicine service for GI services.       Overview/Hospital Course:  No notes on file    Interval History: awake and alert, denies any melena stool,   H/H stable plan for EGD today.       Review of Systems   Constitutional: Negative for appetite change, chills and fever.   Respiratory: Negative for cough and shortness of breath.    Cardiovascular: Negative for chest pain and palpitations.   Gastrointestinal: Negative for abdominal pain, blood in stool, diarrhea, nausea and vomiting.   Genitourinary: Negative for difficulty urinating,  dysuria and hematuria.   Musculoskeletal: Negative for back pain and gait problem.   Skin: Negative for rash and wound.   Neurological: Positive for weakness. Negative for dizziness, syncope, light-headedness and headaches.   Psychiatric/Behavioral: Negative for confusion.     Objective:     Vital Signs (Most Recent):  Temp: 97.2 °F (36.2 °C) (11/09/20 1635)  Pulse: 75 (11/09/20 1635)  Resp: 17 (11/09/20 1635)  BP: (!) 152/68 (11/09/20 1635)  SpO2: 96 % (11/09/20 1635) Vital Signs (24h Range):  Temp:  [97.2 °F (36.2 °C)-98.8 °F (37.1 °C)] 97.2 °F (36.2 °C)  Pulse:  [59-82] 75  Resp:  [16-20] 17  SpO2:  [94 %-100 %] 96 %  BP: (120-153)/(44-80) 152/68     Weight: 56.7 kg (125 lb)  Body mass index is 22.14 kg/m².    Intake/Output Summary (Last 24 hours) at 11/9/2020 1710  Last data filed at 11/9/2020 1458  Gross per 24 hour   Intake 450 ml   Output --   Net 450 ml      Physical Exam  Constitutional:       General: She is not in acute distress.     Appearance: Normal appearance.   HENT:      Head: Normocephalic and atraumatic.   Neck:      Musculoskeletal: Neck supple.   Cardiovascular:      Rate and Rhythm: Normal rate and regular rhythm.      Pulses: Normal pulses.      Heart sounds: Murmur present.   Pulmonary:      Effort: Pulmonary effort is normal.      Breath sounds: Normal breath sounds.   Abdominal:      General: Bowel sounds are normal.      Palpations: Abdomen is soft.   Musculoskeletal: Normal range of motion.   Skin:     General: Skin is warm and dry.   Neurological:      Mental Status: She is alert and oriented to person, place, and time.      Motor: Tremor present. No weakness.   Psychiatric:         Mood and Affect: Mood normal.         Behavior: Behavior normal.         Significant Labs:   ABGs: No results for input(s): PH, PCO2, HCO3, POCSATURATED, BE, TOTALHB, COHB, METHB, O2HB, POCFIO2 in the last 48 hours.  Blood Culture:   No results for input(s): LABBLOO in the last 48 hours.  CBC:   Recent Labs    Lab 11/08/20  1236 11/08/20  1812 11/09/20  0547   WBC 9.55 12.10 8.63   HGB 8.8* 10.3* 9.1*   HCT 29.5* 34.4* 30.7*    375* 368*     CMP:   No results for input(s): NA, K, CL, CO2, GLU, BUN, CREATININE, CALCIUM, PROT, ALBUMIN, BILITOT, ALKPHOS, AST, ALT, ANIONGAP, EGFRNONAA in the last 48 hours.    Invalid input(s): ESTGFAFRICA  Lactic Acid:   No results for input(s): LACTATE in the last 48 hours.  Lipase: No results for input(s): LIPASE in the last 48 hours.  Magnesium:   No results for input(s): MG in the last 48 hours.  Prealbumin: No results for input(s): PREALBUMIN in the last 48 hours.  Respiratory Culture: No results for input(s): GSRESP, RESPIRATORYC in the last 48 hours.  Troponin:   No results for input(s): TROPONINI in the last 48 hours.  TSH:   Recent Labs   Lab 11/07/20  1127   TSH 3.020     Urine Culture: No results for input(s): LABURIN in the last 48 hours.  Urine Studies: No results for input(s): COLORU, APPEARANCEUA, PHUR, SPECGRAV, PROTEINUA, GLUCUA, KETONESU, BILIRUBINUA, OCCULTUA, NITRITE, UROBILINOGEN, LEUKOCYTESUR, RBCUA, WBCUA, BACTERIA, SQUAMEPITHEL, HYALINECASTS in the last 48 hours.    Invalid input(s): SAIRA    Significant Imaging: I have reviewed all pertinent imaging results/findings within the past 24 hours.      Assessment/Plan:      * GI bleed  Bleeding for a month--Hgb 5.7 on arrival  On xarelto for hx PE, DVT  Hold xarelto   Got 2 units of PRBC   Monitor Hgb  Consult GI- plan for colonosocpy today  protonix BID  NPO  Will likely need IVC filter      Weakness  Secondary to anemia  Patient is normally independent, still drives  Consult PT/OT      History of pulmonary embolism  History of DVT (deep vein thrombosis)    1/2019  Bilateral  Hospitalized here at the time      Tremor  Chronic, hereditary      Ulcerative colitis  Chronic--25 years  Has been stable for years  Cont sulfasalazine  Consult GI - awaiting eval.        VTE Risk Mitigation (From admission, onward)          Ordered     IP VTE HIGH RISK PATIENT  Once      11/07/20 1832     Place sequential compression device  Until discontinued      11/07/20 1832                Discharge Planning   CHARLI:      Code Status: Full Code   Is the patient medically ready for discharge?:     Reason for patient still in hospital (select all that apply): Patient trending condition  Discharge Plan A: Home Health                  Adriana Quinteros MD  Department of Hospital Medicine   [facility]

## 2020-11-09 NOTE — [employer]ATION PATIENT INSTRUCTIONS
Discharge Summary/Instructions after an Endoscopic Procedure  Patient Name: Katarina Rivera  Patient MRN: [id number]  Patient YOB: 1934 Monday, November 9, 2020  Lyle Wilburn MD  Your health is very important to us during the Covid Crisis. Following your   procedure today, you will receive a daily text for 2 weeks asking about   signs or symptoms of Covid 19.  Please respond to this text when you   receive it so we can follow up and keep you as safe as possible.   RESTRICTIONS:  During your procedure today, you received medications for sedation.  These   medications may affect your judgment, balance and coordination.  Therefore,   for 24 hours, you have the following restrictions:   - DO NOT drive a car, operate machinery, make legal/financial decisions,   sign important papers or drink alcohol.    ACTIVITY:  Today: no heavy lifting, straining or running due to procedural   sedation/anesthesia.  The following day: return to full activity including work.  DIET:  Eat and drink normally unless instructed otherwise.     TREATMENT FOR COMMON SIDE EFFECTS:  - Mild abdominal pain, nausea, belching, bloating or excessive gas:  rest,   eat lightly and use a heating pad.  - Sore Throat: treat with throat lozenges and/or gargle with warm salt   water.  - Because air was used during the procedure, expelling large amounts of air   from your rectum or belching is normal.  - If a bowel prep was taken, you may not have a bowel movement for 1-3 days.    This is normal.  SYMPTOMS TO WATCH FOR AND REPORT TO YOUR PHYSICIAN:  1. Abdominal pain or bloating, other than gas cramps.  2. Chest pain.  3. Back pain.  4. Signs of infection such as: chills or fever occurring within 24 hours   after the procedure.  5. Rectal bleeding, which would show as bright red, maroon, or black stools.   (A tablespoon of blood from the rectum is not serious, especially if   hemorrhoids are present.)  6. Vomiting.  7. Weakness or dizziness.  GO  DIRECTLY TO THE NEAREST EMERGENCY ROOM IF YOU HAVE ANY OF THE FOLLOWING:      Difficulty breathing              Chills and/or fever over 101 F   Persistent vomiting and/or vomiting blood   Severe abdominal pain   Severe chest pain   Black, tarry stools   Bleeding- more than one tablespoon   Any other symptom or condition that you feel may need urgent attention  Your doctor recommends these additional instructions:  If any biopsies were taken, your doctors clinic will contact you in 1 to 2   weeks with any results.  - [facility].   - Resume previous diet.   - Continue present medications.   - Will discuss with patient and family their wishes and goals of care... It   was recommended in the past to undergo total abdominal colectomy, and I   believe that would be the first choice of next step, as these lesions will   be very difficult to perform surveillance on.   - If patient adamantly declines surgery, can consider biologic therapy to   control disease with the understanding that cancer surveillance will not be   adequate given the concerning findings on todays exam.  - Repeat colonoscopy (date not yet determined) for surveillance.  For questions, problems or results please call your physician - Lyle Wilburn MD.  EMERGENCY PHONE NUMBER: [phone],  LAB RESULTS: [phone]  IF A COMPLICATION OR EMERGENCY SITUATION ARISES AND YOU ARE UNABLE TO REACH   YOUR PHYSICIAN - GO DIRECTLY TO THE EMERGENCY ROOM.  Lyle iWlburn MD  11/9/2020 3:16:27 PM  This report has been verified and signed electronically.  [employer]

## 2020-11-09 NOTE — TRANSFER OF CARE
"Anesthesia Transfer of Care Note    Patient: Katarina Rivera    Procedure(s) Performed: Procedure(s) (LRB):  COLONOSCOPY (N/A)    Patient location: GI    Anesthesia Type: general    Transport from OR: Transported from OR on room air with adequate spontaneous ventilation    Post pain: adequate analgesia    Post assessment: no apparent anesthetic complications and tolerated procedure well    Post vital signs: stable    Level of consciousness: awake and alert    Nausea/Vomiting: no nausea/vomiting    Complications: none    Transfer of care protocol was followed      Last vitals:   Visit Vitals  BP (!) 150/49   Pulse 82   Temp 36.7 °C (98.1 °F)   Resp 18   Ht 5' 3" (1.6 m)   Wt 56.7 kg (125 lb)   SpO2 100%   BMI 22.14 kg/m²     "

## 2020-11-09 NOTE — OR NURSING
Patient scheduled for Colonoscopy today w/Dr. Wilburn. Chart reviewed and report taken from Elen. 2/3 of bowel prep completed with stool results reported as yellow in color. Npo status appropriate for exam for afternoon procedure and IV access available.

## 2020-11-09 NOTE — TELEPHONE ENCOUNTER
Discussed with patient and luisyl.    She needs to see dr. Kapadia for discussion of surgery / and make decision if she wants / desires surgery    Pending that visit, if she ultimately firmly decides no surgery, or if not a surgical candidate, I will consider prednisone and / or entyvio

## 2020-11-09 NOTE — PLAN OF CARE
Patient stable. Plan of care reviewed with patient. Patient verbal reported of understanding. NSR on tele monitor with no red alarms noted. Golytely solution 4000mL started per ordered. Due meds given per ordered. Free of fall throughout the shift. Patient's safety maintained. No acute distress noted. Bed in lowest position. Bed alarm on. Head of bed elevated. Side rails x 2 are up. Call bell within reach. Patient will be monitored overnight.

## 2020-11-09 NOTE — ASSESSMENT & PLAN NOTE
Bleeding for a month--Hgb 5.7 on arrival  On xarelto for hx PE, DVT  Hold xarelto   Got 2 units of PRBC   Monitor Hgb  Consult GI- plan for colonosocpy today  protonix BID  NPO  Will likely need IVC filter     Toxic metabolic encephalopathy

## 2020-11-09 NOTE — ASSESSMENT & PLAN NOTE
Bleeding for a month--Hgb 5.7 on arrival  On xarelto for hx PE, DVT  Holding xarelto   Got 2 units of PRBC   Monitor Hgb - has been stable  Consulted GI - colonosocpy with extensive pancolitis UC on 11/9  Recommendation for colectomy declined by patient  Discussed restarting Xarelto - discussed risk/benefit  Patient and daughter at bedside wish to think about it  May need IVC filter if no anticoagulation  Continue to monitor and re-address again tomorrow

## 2020-11-09 NOTE — PLAN OF CARE
Pt transferred from [facility] for GI evaluation.   Pt reports she lives with her daughter Constance who is at bedside. Pt uses RW as needed and normally drives. Pt's daughter informed Tn she can provide help at home if needed and transportation upon d/c.  Tn gave pt d/c brochure, card, and folder and encouraged to call for any needs/concerns.     11/09/20 1325   Discharge Assessment   Assessment Type Discharge Planning Assessment   Confirmed/corrected address and phone number on facesheet? Yes   Assessment information obtained from? Patient   Expected Length of Stay (days) 2   Prior to hospitilization cognitive status: Alert/Oriented   Prior to hospitalization functional status: Independent;Assistive Equipment   Current cognitive status: Alert/Oriented   Current Functional Status: Needs Assistance   Facility Arrived From: [facility]   Lives With child(alyse), adult   Able to Return to Prior Arrangements yes   Is patient able to care for self after discharge? Yes   Who are your caregiver(s) and their phone number(s)? Constance (daughter) [phone]   Patient's perception of discharge disposition home or selfcare   Readmission Within the Last 30 Days no previous admission in last 30 days   Patient currently being followed by outpatient case management? No   Patient currently receives any other outside agency services? No   Equipment Currently Used at Home bedside commode;walker, rolling   Do you have any problems affording any of your prescribed medications? No   Is the patient taking medications as prescribed? yes   Does the patient have transportation home? Yes   Transportation Anticipated family or friend will provide   Does the patient receive services at the [facility]? No   Discharge Plan A Home Health   Discharge Plan B Home with family   DME Needed Upon Discharge  none   Patient/Family in Agreement with Plan yes

## 2020-11-10 PROBLEM — E87.6 HYPOKALEMIA: Status: ACTIVE | Noted: 2020-11-10

## 2020-11-10 LAB
ALBUMIN SERPL BCP-MCNC: 1.9 G/DL (ref 3.5–5.2)
ALP SERPL-CCNC: 113 U/L (ref 55–135)
ALT SERPL W/O P-5'-P-CCNC: 39 U/L (ref 10–44)
ANION GAP SERPL CALC-SCNC: 14 MMOL/L (ref 8–16)
AST SERPL-CCNC: 144 U/L (ref 10–40)
BASOPHILS # BLD AUTO: 0.04 K/UL (ref 0–0.2)
BASOPHILS NFR BLD: 0.5 % (ref 0–1.9)
BILIRUB SERPL-MCNC: 1.1 MG/DL (ref 0.1–1)
BUN SERPL-MCNC: 23 MG/DL (ref 8–23)
CALCIUM SERPL-MCNC: 8.3 MG/DL (ref 8.7–10.5)
CHLORIDE SERPL-SCNC: 99 MMOL/L (ref 95–110)
CO2 SERPL-SCNC: 24 MMOL/L (ref 23–29)
CREAT SERPL-MCNC: 1.1 MG/DL (ref 0.5–1.4)
DIFFERENTIAL METHOD: ABNORMAL
EOSINOPHIL # BLD AUTO: 0.2 K/UL (ref 0–0.5)
EOSINOPHIL NFR BLD: 2.1 % (ref 0–8)
ERYTHROCYTE [DISTWIDTH] IN BLOOD BY AUTOMATED COUNT: 31.6 % (ref 11.5–14.5)
EST. GFR  (AFRICAN AMERICAN): 53 ML/MIN/1.73 M^2
EST. GFR  (NON AFRICAN AMERICAN): 46 ML/MIN/1.73 M^2
GLUCOSE SERPL-MCNC: 121 MG/DL (ref 70–110)
HCT VFR BLD AUTO: 30.8 % (ref 37–48.5)
HGB BLD-MCNC: 9 G/DL (ref 12–16)
IMM GRANULOCYTES # BLD AUTO: 0.1 K/UL (ref 0–0.04)
IMM GRANULOCYTES NFR BLD AUTO: 1.4 % (ref 0–0.5)
LYMPHOCYTES # BLD AUTO: 1.2 K/UL (ref 1–4.8)
LYMPHOCYTES NFR BLD: 15.9 % (ref 18–48)
MAGNESIUM SERPL-MCNC: 1.4 MG/DL (ref 1.6–2.6)
MCH RBC QN AUTO: 20.6 PG (ref 27–31)
MCHC RBC AUTO-ENTMCNC: 29.2 G/DL (ref 32–36)
MCV RBC AUTO: 71 FL (ref 82–98)
MONOCYTES # BLD AUTO: 0.7 K/UL (ref 0.3–1)
MONOCYTES NFR BLD: 10 % (ref 4–15)
NEUTROPHILS # BLD AUTO: 5.1 K/UL (ref 1.8–7.7)
NEUTROPHILS NFR BLD: 70.1 % (ref 38–73)
NRBC BLD-RTO: 0 /100 WBC
PLATELET # BLD AUTO: 264 K/UL (ref 150–350)
PMV BLD AUTO: 10 FL (ref 9.2–12.9)
POCT GLUCOSE: 98 MG/DL (ref 70–110)
POTASSIUM SERPL-SCNC: 2.2 MMOL/L (ref 3.5–5.1)
PROT SERPL-MCNC: 8.2 G/DL (ref 6–8.4)
RBC # BLD AUTO: 4.36 M/UL (ref 4–5.4)
SODIUM SERPL-SCNC: 137 MMOL/L (ref 136–145)
WBC # BLD AUTO: 7.28 K/UL (ref 3.9–12.7)

## 2020-11-10 PROCEDURE — 25000003 PHARM REV CODE 250: Performed by: HOSPITALIST

## 2020-11-10 PROCEDURE — 86480 TB TEST CELL IMMUN MEASURE: CPT

## 2020-11-10 PROCEDURE — 63600175 PHARM REV CODE 636 W HCPCS: Performed by: HOSPITALIST

## 2020-11-10 PROCEDURE — 25000003 PHARM REV CODE 250: Performed by: NURSE PRACTITIONER

## 2020-11-10 PROCEDURE — 97110 THERAPEUTIC EXERCISES: CPT | Mod: CQ

## 2020-11-10 PROCEDURE — 99232 PR SUBSEQUENT HOSPITAL CARE,LEVL II: ICD-10-PCS | Mod: ,,, | Performed by: INTERNAL MEDICINE

## 2020-11-10 PROCEDURE — 86704 HEP B CORE ANTIBODY TOTAL: CPT

## 2020-11-10 PROCEDURE — 86706 HEP B SURFACE ANTIBODY: CPT

## 2020-11-10 PROCEDURE — 83735 ASSAY OF MAGNESIUM: CPT

## 2020-11-10 PROCEDURE — 87340 HEPATITIS B SURFACE AG IA: CPT

## 2020-11-10 PROCEDURE — 63600175 PHARM REV CODE 636 W HCPCS: Performed by: FAMILY MEDICINE

## 2020-11-10 PROCEDURE — 99232 SBSQ HOSP IP/OBS MODERATE 35: CPT | Mod: ,,, | Performed by: INTERNAL MEDICINE

## 2020-11-10 PROCEDURE — 85025 COMPLETE CBC W/AUTO DIFF WBC: CPT

## 2020-11-10 PROCEDURE — C9113 INJ PANTOPRAZOLE SODIUM, VIA: HCPCS | Performed by: FAMILY MEDICINE

## 2020-11-10 PROCEDURE — 80053 COMPREHEN METABOLIC PANEL: CPT

## 2020-11-10 PROCEDURE — 36415 COLL VENOUS BLD VENIPUNCTURE: CPT

## 2020-11-10 PROCEDURE — 11000001 HC ACUTE MED/SURG PRIVATE ROOM

## 2020-11-10 RX ORDER — POTASSIUM CHLORIDE 1.5 G/1.58G
40 POWDER, FOR SOLUTION ORAL EVERY 4 HOURS
Status: COMPLETED | OUTPATIENT
Start: 2020-11-10 | End: 2020-11-10

## 2020-11-10 RX ORDER — MAGNESIUM SULFATE HEPTAHYDRATE 40 MG/ML
4 INJECTION, SOLUTION INTRAVENOUS ONCE
Status: COMPLETED | OUTPATIENT
Start: 2020-11-10 | End: 2020-11-10

## 2020-11-10 RX ORDER — POTASSIUM CHLORIDE 7.45 MG/ML
10 INJECTION INTRAVENOUS
Status: COMPLETED | OUTPATIENT
Start: 2020-11-10 | End: 2020-11-10

## 2020-11-10 RX ADMIN — MAGNESIUM SULFATE IN WATER 4 G: 40 INJECTION, SOLUTION INTRAVENOUS at 12:11

## 2020-11-10 RX ADMIN — POTASSIUM CHLORIDE 10 MEQ: 7.46 INJECTION, SOLUTION INTRAVENOUS at 12:11

## 2020-11-10 RX ADMIN — POTASSIUM CHLORIDE 40 MEQ: 1.5 FOR SOLUTION ORAL at 02:11

## 2020-11-10 RX ADMIN — POTASSIUM CHLORIDE 40 MEQ: 1.5 FOR SOLUTION ORAL at 12:11

## 2020-11-10 RX ADMIN — POTASSIUM CHLORIDE 40 MEQ: 1.5 FOR SOLUTION ORAL at 05:11

## 2020-11-10 RX ADMIN — SULFASALAZINE 500 MG: 500 TABLET ORAL at 08:11

## 2020-11-10 RX ADMIN — LATANOPROST 1 DROP: 50 SOLUTION OPHTHALMIC at 09:11

## 2020-11-10 RX ADMIN — POTASSIUM CHLORIDE 10 MEQ: 7.46 INJECTION, SOLUTION INTRAVENOUS at 01:11

## 2020-11-10 RX ADMIN — POTASSIUM CHLORIDE 40 MEQ: 1.5 FOR SOLUTION ORAL at 09:11

## 2020-11-10 RX ADMIN — PANTOPRAZOLE SODIUM 40 MG: 40 INJECTION, POWDER, LYOPHILIZED, FOR SOLUTION INTRAVENOUS at 10:11

## 2020-11-10 RX ADMIN — PANTOPRAZOLE SODIUM 40 MG: 40 INJECTION, POWDER, LYOPHILIZED, FOR SOLUTION INTRAVENOUS at 11:11

## 2020-11-10 RX ADMIN — SULFASALAZINE 500 MG: 500 TABLET ORAL at 09:11

## 2020-11-10 NOTE — PT/OT/SLP PROGRESS
Occupational Therapy      Patient Name:  Katarina Rivera   MRN:  [id number]    Attempts made 2:09 PM and 3:15 PM Patient not seen today secondary to PT working with pt upon first attempt, pt and daughter declined OT upon 2nd attempts 2/2 Patient fatigue, Patient unwilling to participate. Will follow-up as able.    Sanaz Dubois, OT  11/10/2020

## 2020-11-10 NOTE — ASSESSMENT & PLAN NOTE
Complicated history of UC.  Follows with Dr. San.  Reviewed last colonoscopy 2015, showed fairly significant sigmoid stricture (required upper gi scope to traverse).  Also, there was apparently a large adenomatous polyp found. This was not removed. This is still present.  She saw CRS and it was recommended to have total colectomy, but she deferred.    Colonoscopy showing active pancolitis with areas of nodularity and inflammatory polyps as well as deep rectal ulcer.  Suspect bleeding is multifactorial, mainly driven by active pancolitis but also inflammatory polyps      Recs:  Follow up pathology  OK to resume anticoag from my perspective.  She needs follow up with colorectal surgery after discharge, I have placed request.    Ok for discharge from GI perspective    Once she is seen by colorectal, I will arrange a plan going forward.

## 2020-11-10 NOTE — PLAN OF CARE
Plan of care reviewed with patient and family. Patient and family member verbalized understanding. AAOX4. Patient could not tolerate IV potassium. Notified . Potassium switched to oral. Telemetry applied. Zero true red alarms or ectopy. Patient is on room air and reports No SOB or signs/symptoms of distress observed. Bed locked and low, call light within reach, bed alarm on, non skid socks applied, side railsx2, fall risk band applied. Instructed to call if needing assistance. Will continue to monitor.

## 2020-11-10 NOTE — PLAN OF CARE
TN noted therapy recommending hh upon d/c and Tn provide list to pt and daughter. Pt requesting [facility] as she reports she had them in past. Tn to send referral and orders upon d/c.     11/10/20 1348   Post-Acute Status   Post-Acute Authorization Home Health   Patient choice form signed by patient/caregiver List with quality metrics by geographic area provided   Discharge Plan   Discharge Plan A Home Health

## 2020-11-10 NOTE — PLAN OF CARE
Patient refused second bag of magnesium. She stated it burns and refused to let me start a second IV. MD aware.

## 2020-11-10 NOTE — PROGRESS NOTES
[facility]  Gastroenterology  Progress Note    Patient Name: Katarina Rivera  MRN: [id number]  Admission Date: 11/7/2020  Hospital Length of Stay: 3 days  Code Status: Full Code   Attending Provider: Christine Baer MD  Consulting Provider: Lyle Wilburn MD  Primary Care Physician: Ethan Denton MD  Principal Problem: GI bleed      Subjective:     Interval History:     Feeling better this morning.  Still some lethargy.  No abdominal pain.  No radiating symptoms.  Appetite is okay.    Review of Systems   Constitutional: Positive for appetite change and fatigue. Negative for fever.   Respiratory: Negative for choking and chest tightness.    Gastrointestinal: Positive for abdominal distention and blood in stool. Negative for abdominal pain and vomiting.   Skin: Positive for pallor. Negative for color change.     Objective:     Vital Signs (Most Recent):  Temp: 98.5 °F (36.9 °C) (11/10/20 0734)  Pulse: 90 (11/10/20 0734)  Resp: 18 (11/10/20 0734)  BP: (!) 148/65 (11/10/20 0734)  SpO2: 96 % (11/10/20 0734) Vital Signs (24h Range):  Temp:  [97.1 °F (36.2 °C)-98.8 °F (37.1 °C)] 98.5 °F (36.9 °C)  Pulse:  [63-93] 90  Resp:  [16-20] 18  SpO2:  [94 %-100 %] 96 %  BP: (120-162)/(44-68) 148/65     Weight: 53.4 kg (117 lb 11.6 oz) (11/10/20 0424)  Body mass index is 20.85 kg/m².      Intake/Output Summary (Last 24 hours) at 11/10/2020 0913  Last data filed at 11/10/2020 0900  Gross per 24 hour   Intake 450 ml   Output 550 ml   Net -100 ml       Lines/Drains/Airways     Peripheral Intravenous Line                 Peripheral IV - Single Lumen 11/08/20 1120 Left Forearm 1 day                Physical Exam  Constitutional:       Appearance: She is not ill-appearing.      Comments: Chronically ill appearing   HENT:      Head: Normocephalic and atraumatic.   Eyes:      General: No scleral icterus.     Conjunctiva/sclera: Conjunctivae normal.   Abdominal:      Palpations: Abdomen is soft. There is no mass.       Tenderness: There is no abdominal tenderness. There is no guarding.   Neurological:      Mental Status: She is alert.   Psychiatric:         Mood and Affect: Mood normal.         Behavior: Behavior normal.         Significant Labs:  Amylase: No results for input(s): AMYLASE in the last 48 hours.  Blood Culture: No results for input(s): LABBLOO in the last 48 hours.  CBC:   Recent Labs   Lab 11/08/20  1812 11/09/20  0547 11/10/20  0745   WBC 12.10 8.63 7.28   HGB 10.3* 9.1* 9.0*   HCT 34.4* 30.7* 30.8*   * 368* 264     CMP:   Recent Labs   Lab 11/10/20  0745   *   CALCIUM 8.3*   ALBUMIN 1.9*   PROT 8.2      K 2.2*   CO2 24   CL 99   BUN 23   ALKPHOS 113   ALT 39   *   BILITOT 1.1*     Coagulation: No results for input(s): PT, INR, APTT in the last 48 hours.      Significant Imaging:  Imaging results within the past 24 hours have been reviewed.    Assessment/Plan:     History of ulcerative colitis  Complicated history of UC.  Follows with Dr. San.  Reviewed last colonoscopy 2015, showed fairly significant sigmoid stricture (required upper gi scope to traverse).  Also, there was apparently a large adenomatous polyp found. This was not removed. This is still present.  She saw CRS and it was recommended to have total colectomy, but she deferred.    Colonoscopy showing active pancolitis with areas of nodularity and inflammatory polyps as well as deep rectal ulcer.  Suspect bleeding is multifactorial, mainly driven by active pancolitis but also inflammatory polyps      Recs:  Follow up pathology  OK to resume anticoag from my perspective.  She needs follow up with colorectal surgery after discharge, I have placed request.    Ok for discharge from GI perspective    Once she is seen by colorectal, I will arrange a plan going forward.              Thank you for your consult. I will sign off. Please contact us if you have any additional questions.    Lyle Wilburn MD  Gastroenterology  [facility]

## 2020-11-10 NOTE — PLAN OF CARE
Problem: Fall Injury Risk  Goal: Absence of Fall and Fall-Related Injury  Outcome: Ongoing, Progressing     Pt tremors make ambulation difficult but reminded to take time when walking, use assistive devices. Pt remains free from fall while in the hospital.

## 2020-11-10 NOTE — PT/OT/SLP PROGRESS
Physical Therapy Treatment    Patient Name:  Katarina Rivera   MRN:  [id number]    Recommendations:     Discharge Recommendations:  home health PT, home health OT   Discharge Equipment Recommendations: none   Barriers to discharge: decreased mobility,strength and endurance    Assessment:     Katarina Rivera is a 86 y.o. female admitted with a medical diagnosis of GI bleed.  She presents with the following impairments/functional limitations:  weakness, impaired endurance, impaired functional mobilty, gait instability, impaired balance, decreased lower extremity function, decreased ROM, impaired coordination,pt with decreased participation and appeared agrevated,pt.requires assistance with all mobility and will benefit from HH services upon discharge    Rehab Prognosis: Fair; patient would benefit from acute skilled PT services to address these deficits and reach maximum level of function.    Recent Surgery: Procedure(s) (LRB):  COLONOSCOPY (N/A) 1 Day Post-Op    Plan:     During this hospitalization, patient to be seen 4 x/week to address the identified rehab impairments via gait training, therapeutic activities, therapeutic exercises, neuromuscular re-education and progress toward the following goals:    · Plan of Care Expires:  12/08/20    Subjective     Chief Complaint: n/a  Patient/Family Comments/goals: pt stated no OOB today.  Pain/Comfort:  · Pain Rating 1: (no c/o's)      Objective:     Communicated with nsg prior to session.  Patient found supine with bed alarm, peripheral IV, telemetry upon PT entry to room.     General Precautions: Standard, fall   Orthopedic Precautions:N/A   Braces: N/A     Functional Mobility:  · Gait: n/a      AM-PAC 6 CLICK MOBILITY  Turning over in bed (including adjusting bedclothes, sheets and blankets)?: 4  Sitting down on and standing up from a chair with arms (e.g., wheelchair, bedside commode, etc.): 3  Moving from lying on back to sitting on the side of the bed?: 4  Moving to and from a  bed to a chair (including a wheelchair)?: 3  Need to walk in hospital room?: 3  Climbing 3-5 steps with a railing?: 2  Basic Mobility Total Score: 19       Therapeutic Activities and Exercises: le supine ex's X 10-12 reps inc: ap,qs,hs,abd/add,refused further rx,nsg notified.       Patient left supine with all lines intact, call button in reach, bed alarm on, nsg notified and daughter present..    GOALS:  See general POC  Multidisciplinary Problems     Physical Therapy Goals        Problem: Physical Therapy Goal    Goal Priority Disciplines Outcome Goal Variances Interventions   Physical Therapy Goal     PT, PT/OT Ongoing, Progressing     Description: Goals to be met by: 2020     Patient will increase functional independence with mobility by performin. Supine to sit with Modified [place]  2. Sit to stand transfer with Modified [place]  3. Bed to chair transfer with Modified [place] using Rolling Walker  4. Gait  x 150 feet with Modified [place] using Rolling Walker.   5. Increased functional strength to WFL for decreased fall risk.  6. Lower extremity exercise program x10 reps per handout, with independence                     Time Tracking:     PT Received On: 11/10/20  PT Start Time: 1402     PT Stop Time: 1413  PT Total Time (min): 11 min     Billable Minutes: Therapeutic Exercise 11    Treatment Type: Treatment  PT/PTA: PTA     PTA Visit Number: 2     Eze Orellana, PTA  11/10/2020

## 2020-11-10 NOTE — SUBJECTIVE & OBJECTIVE
Subjective:     Interval History:     Feeling better this morning.  Still some lethargy.  No abdominal pain.  No radiating symptoms.  Appetite is okay.    Review of Systems   Constitutional: Positive for appetite change and fatigue. Negative for fever.   Respiratory: Negative for choking and chest tightness.    Gastrointestinal: Positive for abdominal distention and blood in stool. Negative for abdominal pain and vomiting.   Skin: Positive for pallor. Negative for color change.     Objective:     Vital Signs (Most Recent):  Temp: 98.5 °F (36.9 °C) (11/10/20 0734)  Pulse: 90 (11/10/20 0734)  Resp: 18 (11/10/20 0734)  BP: (!) 148/65 (11/10/20 0734)  SpO2: 96 % (11/10/20 0734) Vital Signs (24h Range):  Temp:  [97.1 °F (36.2 °C)-98.8 °F (37.1 °C)] 98.5 °F (36.9 °C)  Pulse:  [63-93] 90  Resp:  [16-20] 18  SpO2:  [94 %-100 %] 96 %  BP: (120-162)/(44-68) 148/65     Weight: 53.4 kg (117 lb 11.6 oz) (11/10/20 0424)  Body mass index is 20.85 kg/m².      Intake/Output Summary (Last 24 hours) at 11/10/2020 0913  Last data filed at 11/10/2020 0900  Gross per 24 hour   Intake 450 ml   Output 550 ml   Net -100 ml       Lines/Drains/Airways     Peripheral Intravenous Line                 Peripheral IV - Single Lumen 11/08/20 1120 Left Forearm 1 day                Physical Exam  Constitutional:       Appearance: She is not ill-appearing.      Comments: Chronically ill appearing   HENT:      Head: Normocephalic and atraumatic.   Eyes:      General: No scleral icterus.     Conjunctiva/sclera: Conjunctivae normal.   Abdominal:      Palpations: Abdomen is soft. There is no mass.      Tenderness: There is no abdominal tenderness. There is no guarding.   Neurological:      Mental Status: She is alert.   Psychiatric:         Mood and Affect: Mood normal.         Behavior: Behavior normal.         Significant Labs:  Amylase: No results for input(s): AMYLASE in the last 48 hours.  Blood Culture: No results for input(s): LABBLOO in the last  48 hours.  CBC:   Recent Labs   Lab 11/08/20  1812 11/09/20  0547 11/10/20  0745   WBC 12.10 8.63 7.28   HGB 10.3* 9.1* 9.0*   HCT 34.4* 30.7* 30.8*   * 368* 264     CMP:   Recent Labs   Lab 11/10/20  0745   *   CALCIUM 8.3*   ALBUMIN 1.9*   PROT 8.2      K 2.2*   CO2 24   CL 99   BUN 23   ALKPHOS 113   ALT 39   *   BILITOT 1.1*     Coagulation: No results for input(s): PT, INR, APTT in the last 48 hours.      Significant Imaging:  Imaging results within the past 24 hours have been reviewed.

## 2020-11-10 NOTE — PLAN OF CARE
Problem: Physical Therapy Goal  Goal: Physical Therapy Goal  Description: Goals to be met by: 2020     Patient will increase functional independence with mobility by performin. Supine to sit with Modified [place]  2. Sit to stand transfer with Modified [place]  3. Bed to chair transfer with Modified [place] using Rolling Walker  4. Gait  x 150 feet with Modified [place] using Rolling Walker.   5. Increased functional strength to WFL for decreased fall risk.  6. Lower extremity exercise program x10 reps per handout, with independence    Outcome: Ongoing, Progressing

## 2020-11-11 LAB
ALBUMIN SERPL BCP-MCNC: 3.2 G/DL (ref 3.5–5.2)
ALP SERPL-CCNC: 79 U/L (ref 55–135)
ALT SERPL W/O P-5'-P-CCNC: 5 U/L (ref 10–44)
ANION GAP SERPL CALC-SCNC: 13 MMOL/L (ref 8–16)
AST SERPL-CCNC: 13 U/L (ref 10–40)
BASOPHILS # BLD AUTO: 0.06 K/UL (ref 0–0.2)
BASOPHILS NFR BLD: 0.7 % (ref 0–1.9)
BILIRUB SERPL-MCNC: 0.8 MG/DL (ref 0.1–1)
BUN SERPL-MCNC: 9 MG/DL (ref 8–23)
CALCIUM SERPL-MCNC: 8.8 MG/DL (ref 8.7–10.5)
CHLORIDE SERPL-SCNC: 105 MMOL/L (ref 95–110)
CO2 SERPL-SCNC: 16 MMOL/L (ref 23–29)
CREAT SERPL-MCNC: 1 MG/DL (ref 0.5–1.4)
DIFFERENTIAL METHOD: ABNORMAL
EOSINOPHIL # BLD AUTO: 0.3 K/UL (ref 0–0.5)
EOSINOPHIL NFR BLD: 3.1 % (ref 0–8)
ERYTHROCYTE [DISTWIDTH] IN BLOOD BY AUTOMATED COUNT: 32.8 % (ref 11.5–14.5)
EST. GFR  (AFRICAN AMERICAN): 59 ML/MIN/1.73 M^2
EST. GFR  (NON AFRICAN AMERICAN): 51 ML/MIN/1.73 M^2
GLUCOSE SERPL-MCNC: 86 MG/DL (ref 70–110)
HBV CORE AB SERPL QL IA: NEGATIVE
HBV SURFACE AB SER-ACNC: NEGATIVE M[IU]/ML
HBV SURFACE AG SERPL QL IA: NEGATIVE
HCT VFR BLD AUTO: 33.6 % (ref 37–48.5)
HGB BLD-MCNC: 9.7 G/DL (ref 12–16)
IMM GRANULOCYTES # BLD AUTO: 0.15 K/UL (ref 0–0.04)
IMM GRANULOCYTES NFR BLD AUTO: 1.7 % (ref 0–0.5)
INR PPP: 1.1 (ref 0.8–1.2)
LYMPHOCYTES # BLD AUTO: 1.6 K/UL (ref 1–4.8)
LYMPHOCYTES NFR BLD: 17.6 % (ref 18–48)
MAGNESIUM SERPL-MCNC: 2.2 MG/DL (ref 1.6–2.6)
MCH RBC QN AUTO: 20.8 PG (ref 27–31)
MCHC RBC AUTO-ENTMCNC: 28.9 G/DL (ref 32–36)
MCV RBC AUTO: 72 FL (ref 82–98)
MONOCYTES # BLD AUTO: 0.9 K/UL (ref 0.3–1)
MONOCYTES NFR BLD: 10 % (ref 4–15)
NEUTROPHILS # BLD AUTO: 5.9 K/UL (ref 1.8–7.7)
NEUTROPHILS NFR BLD: 66.9 % (ref 38–73)
NRBC BLD-RTO: 0 /100 WBC
PHOSPHATE SERPL-MCNC: 2 MG/DL (ref 2.7–4.5)
PLATELET # BLD AUTO: 247 K/UL (ref 150–350)
PMV BLD AUTO: 9.4 FL (ref 9.2–12.9)
POTASSIUM SERPL-SCNC: 5.2 MMOL/L (ref 3.5–5.1)
PROT SERPL-MCNC: 7.2 G/DL (ref 6–8.4)
PROTHROMBIN TIME: 11.6 SEC (ref 9–12.5)
RBC # BLD AUTO: 4.67 M/UL (ref 4–5.4)
SODIUM SERPL-SCNC: 134 MMOL/L (ref 136–145)
WBC # BLD AUTO: 8.81 K/UL (ref 3.9–12.7)

## 2020-11-11 PROCEDURE — 85025 COMPLETE CBC W/AUTO DIFF WBC: CPT

## 2020-11-11 PROCEDURE — 85610 PROTHROMBIN TIME: CPT

## 2020-11-11 PROCEDURE — 25000003 PHARM REV CODE 250: Performed by: NURSE PRACTITIONER

## 2020-11-11 PROCEDURE — 11000001 HC ACUTE MED/SURG PRIVATE ROOM

## 2020-11-11 PROCEDURE — 25000003 PHARM REV CODE 250: Performed by: FAMILY MEDICINE

## 2020-11-11 PROCEDURE — 83735 ASSAY OF MAGNESIUM: CPT

## 2020-11-11 PROCEDURE — 99232 PR SUBSEQUENT HOSPITAL CARE,LEVL II: ICD-10-PCS | Mod: ,,, | Performed by: INTERNAL MEDICINE

## 2020-11-11 PROCEDURE — 97110 THERAPEUTIC EXERCISES: CPT | Mod: CQ

## 2020-11-11 PROCEDURE — 97116 GAIT TRAINING THERAPY: CPT | Mod: CQ

## 2020-11-11 PROCEDURE — 84100 ASSAY OF PHOSPHORUS: CPT

## 2020-11-11 PROCEDURE — C9113 INJ PANTOPRAZOLE SODIUM, VIA: HCPCS | Performed by: FAMILY MEDICINE

## 2020-11-11 PROCEDURE — 80053 COMPREHEN METABOLIC PANEL: CPT

## 2020-11-11 PROCEDURE — 63600175 PHARM REV CODE 636 W HCPCS: Performed by: FAMILY MEDICINE

## 2020-11-11 PROCEDURE — 25000003 PHARM REV CODE 250: Performed by: HOSPITALIST

## 2020-11-11 PROCEDURE — 97530 THERAPEUTIC ACTIVITIES: CPT

## 2020-11-11 PROCEDURE — 99232 SBSQ HOSP IP/OBS MODERATE 35: CPT | Mod: ,,, | Performed by: INTERNAL MEDICINE

## 2020-11-11 RX ADMIN — SULFASALAZINE 500 MG: 500 TABLET ORAL at 08:11

## 2020-11-11 RX ADMIN — LATANOPROST 1 DROP: 50 SOLUTION OPHTHALMIC at 08:11

## 2020-11-11 RX ADMIN — PANTOPRAZOLE SODIUM 40 MG: 40 INJECTION, POWDER, LYOPHILIZED, FOR SOLUTION INTRAVENOUS at 09:11

## 2020-11-11 RX ADMIN — RIVAROXABAN 20 MG: 20 TABLET, FILM COATED ORAL at 07:11

## 2020-11-11 RX ADMIN — SULFASALAZINE 500 MG: 500 TABLET ORAL at 09:11

## 2020-11-11 RX ADMIN — PANTOPRAZOLE SODIUM 40 MG: 40 INJECTION, POWDER, LYOPHILIZED, FOR SOLUTION INTRAVENOUS at 08:11

## 2020-11-11 RX ADMIN — ATORVASTATIN CALCIUM 20 MG: 20 TABLET, FILM COATED ORAL at 09:11

## 2020-11-11 NOTE — PLAN OF CARE
Problem: Physical Therapy Goal  Goal: Physical Therapy Goal  Description: Goals to be met by: 2020     Patient will increase functional independence with mobility by performin. Supine to sit with Modified [place]  2. Sit to stand transfer with Modified [place]  3. Bed to chair transfer with Modified [place] using Rolling Walker  4. Gait  x 150 feet with Modified [place] using Rolling Walker.   5. Increased functional strength to WFL for decreased fall risk.  6. Lower extremity exercise program x10 reps per handout, with independence    Outcome: Ongoing, Progressing

## 2020-11-11 NOTE — PHYSICIAN QUERY
PT Name: Katarina Rivera  MR #: [id number]    HEMATOLOGY CLARIFICATION      CDS: Cherise TATE RN  Contact information: [email or web address]    This form is a permanent document in the medical record.      Query Date: November 11, 2020    By submitting this query, we are merely seeking further clarification of documentation. Please utilize your independent clinical judgment when addressing the question(s) below.    The Medical Record contains the following:   Indicators  Supporting Clinical Findings Location in Medical Record   X Anemia documented Now with bloody diarrhea, but no other symptoms. Severe anemia, likely exacerbated by anticoag and ongoing uc and possible bleeding from polyp ? Or has this progressed to cancer? Consults Gastro 11/8/2020   X H&H H/H= 5.7/22 -- > 8.4/27.7 -- > 10.3/34.4 -- > 9.7/33.6 Labs 111/7/2020- 11/11/2020    BP                    HR     X GI bleeding documented * GI bleed  Bleeding for a month--Hgb 5.7 on arrival  On xarelto for hx PE, DVT  Hold xarelto     Ongoing rectal bleeding for at least 2 months.  Associated with loose stools and diarrhea.  No vomiting.  No abdominal pain.  She takes mesalamine product for her chronic ulcerative colitis.  Last colonoscopy was in 2015 and this was reviewed.  Previously followed by Dr. San.  No infectious type symptoms.  No fevers.  On chronic Xarelto for her pulmonary embolism.  Associated fatigue and lethargy. Consults Gastro 11/8/2020          [facility] 11/8/2020    Acute bleeding (Non GI site)     X Transfusion(s) Got 2 units of PRBC  [facility] 11/8/2020   X Acute/Chronic illness HPI: Ms. 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     Colonoscopy showing active pancolitis with areas of nodularity and inflammatory polyps as well as deep rectal ulcer.  Suspect bleeding is  multifactorial, mainly driven by active pancolitis but also inflammatory polyps H[facility] Hosp Med 11/8/2020          PN Gastro 11/10/2020   X Treatments Monitor Hgb  Consult GI  protonix BID [facility] 11/8/2020    Other       Provider, please specify diagnosis or diagnoses associated with above clinical findings.     Doctor, please select all that apply:    [ x  ] Acute blood loss anemia    [   ] Chronic blood loss anemia     [   ] Anemia due to chronic disease (please specify): _________________   [   ] Anemia, unspecified    [   ] Other Hematological Diagnosis (please specify): _________________   [   ] Clinically Undetermined     Present on admission (POA) status:   [ x  ] Yes (Y)   [   ] No (N)   [   ] Documentation insufficient to determine if condition is POA (U)   [  ] Clinically Undetermined (W)          Please document in your progress notes daily for the duration of treatment, until resolved, and include in your discharge summary.    Form No. [id number]

## 2020-11-11 NOTE — SUBJECTIVE & OBJECTIVE
Subjective:     Interval History:   No further bleeding. Having some diarrhea. No abd pain. No radiating symptmos.    Asking again about her options regarding surgery etc, biologic therapy etc.    Review of Systems   Constitutional: Positive for appetite change and fatigue. Negative for fever.   Respiratory: Negative for choking and chest tightness.    Gastrointestinal: Positive for abdominal distention. Negative for abdominal pain, blood in stool and vomiting.   Skin: Positive for pallor. Negative for color change.     Objective:     Vital Signs (Most Recent):  Temp: 98 °F (36.7 °C) (11/11/20 1554)  Pulse: 103 (11/11/20 1554)  Resp: 20 (11/11/20 1554)  BP: 134/63 (11/11/20 1554)  SpO2: (!) 94 % (11/11/20 1554) Vital Signs (24h Range):  Temp:  [97.2 °F (36.2 °C)-99 °F (37.2 °C)] 98 °F (36.7 °C)  Pulse:  [] 103  Resp:  [17-20] 20  SpO2:  [94 %-98 %] 94 %  BP: (130-163)/(60-67) 134/63     Weight: 53.4 kg (117 lb 11.6 oz) (11/10/20 0424)  Body mass index is 20.85 kg/m².      Intake/Output Summary (Last 24 hours) at 11/11/2020 1713  Last data filed at 11/11/2020 1606  Gross per 24 hour   Intake 580 ml   Output --   Net 580 ml       Lines/Drains/Airways     Peripheral Intravenous Line                 Peripheral IV - Single Lumen 11/10/20 2340 22 G Left;Posterior Forearm less than 1 day                Physical Exam  Constitutional:       Appearance: She is not ill-appearing.      Comments: Chronically ill appearing   HENT:      Head: Normocephalic and atraumatic.   Eyes:      General: No scleral icterus.     Conjunctiva/sclera: Conjunctivae normal.   Abdominal:      Palpations: Abdomen is soft. There is no mass.      Tenderness: There is no abdominal tenderness. There is no guarding.   Neurological:      Mental Status: She is alert.   Psychiatric:         Mood and Affect: Mood normal.         Behavior: Behavior normal.         Significant Labs:  Amylase: No results for input(s): AMYLASE in the last 48  hours.  Blood Culture: No results for input(s): LABBLOO in the last 48 hours.  CBC:   Recent Labs   Lab 11/10/20  0745 11/11/20  0822   WBC 7.28 8.81   HGB 9.0* 9.7*   HCT 30.8* 33.6*    247     CMP:   Recent Labs   Lab 11/11/20  0823   GLU 86   CALCIUM 8.8   ALBUMIN 3.2*   PROT 7.2   *   K 5.2*   CO2 16*      BUN 9   CREATININE 1.0   ALKPHOS 79   ALT 5*   AST 13   BILITOT 0.8     Coagulation:   Recent Labs   Lab 11/11/20  0823   INR 1.1         Significant Imaging:  Imaging results within the past 24 hours have been reviewed.

## 2020-11-11 NOTE — PT/OT/SLP PROGRESS
Physical Therapy Treatment    Patient Name:  Katarina Rivera   MRN:  [id number]    Recommendations:     Discharge Recommendations:  home health PT, home health OT   Discharge Equipment Recommendations: none   Barriers to discharge: decreased mobility,strength and endurance    Assessment:     Katarina Rivera is a 86 y.o. female admitted with a medical diagnosis of GI bleed.  She presents with the following impairments/functional limitations:  weakness, impaired endurance, impaired functional mobilty, gait instability, impaired balance, decreased lower extremity function, decreased ROM, impaired coordination,pt with increased participation and tolerated gait w/o difficulty,pt will benefit from HH services upon discharge.    Rehab Prognosis: Good; patient would benefit from acute skilled PT services to address these deficits and reach maximum level of function.    Recent Surgery: Procedure(s) (LRB):  COLONOSCOPY (N/A) 2 Days Post-Op    Plan:     During this hospitalization, patient to be seen 4 x/week to address the identified rehab impairments via gait training, therapeutic activities, therapeutic exercises, neuromuscular re-education and progress toward the following goals:    · Plan of Care Expires:  12/08/20    Subjective     Chief Complaint: n/a  Patient/Family Comments/goals: pt agreeable to OOB.  Pain/Comfort:  · Pain Rating 1: 0/10      Objective:     Communicated with nsg prior to session.  Patient found supine with telemetry upon PT entry to room.     General Precautions: Standard, fall   Orthopedic Precautions:N/A   Braces: N/A     Functional Mobility:  · Bed Mobility:     · Supine to Sit: contact guard assistance  · Transfers:     · Sit to Stand:  contact guard assistance with rolling walker  · Bed to Chair: stand by assistance and contact guard assistance with  rolling walker  using  ambulation  · Gait: amb ~14' with RW and SBA/CGA   · Balance: fair standing balance with RW      AM-PAC 6 CLICK MOBILITY  Turning over  in bed (including adjusting bedclothes, sheets and blankets)?: 4  Sitting down on and standing up from a chair with arms (e.g., wheelchair, bedside commode, etc.): 3  Moving from lying on back to sitting on the side of the bed?: 3  Moving to and from a bed to a chair (including a wheelchair)?: 3  Need to walk in hospital room?: 3  Climbing 3-5 steps with a railing?: 2  Basic Mobility Total Score: 18       Therapeutic Activities and Exercises: le supine ex's x 10-12 reps inc: ap,qs,hs,abd/add,slr.       Patient left up in chair with all lines intact, call button in reach and nsg notified..    GOALS: see general POC  Multidisciplinary Problems     Physical Therapy Goals        Problem: Physical Therapy Goal    Goal Priority Disciplines Outcome Goal Variances Interventions   Physical Therapy Goal     PT, PT/OT Ongoing, Progressing     Description: Goals to be met by: 2020     Patient will increase functional independence with mobility by performin. Supine to sit with Modified [place]  2. Sit to stand transfer with Modified [place]  3. Bed to chair transfer with Modified [place] using Rolling Walker  4. Gait  x 150 feet with Modified [place] using Rolling Walker.   5. Increased functional strength to WFL for decreased fall risk.  6. Lower extremity exercise program x10 reps per handout, with independence                     Time Tracking:     PT Received On: 20  PT Start Time: 1053     PT Stop Time: 1117  PT Total Time (min): 24 min     Billable Minutes: Gait Training 14 and Therapeutic Exercise 10    Treatment Type: Treatment  PT/PTA: PTA     PTA Visit Number: 3     Eze Orellana, PTA  2020

## 2020-11-11 NOTE — SUBJECTIVE & OBJECTIVE
Interval History: No reported bleeding, could not tolerate IV KCL.    Review of Systems   Constitutional: Negative for chills.   Respiratory: Negative for shortness of breath.    Gastrointestinal: Negative for abdominal pain and blood in stool.     Objective:     Vital Signs (Most Recent):  Temp: 97.2 °F (36.2 °C) (11/10/20 2000)  Pulse: 75 (11/10/20 2000)  Resp: 17 (11/10/20 2000)  BP: (!) 140/63 (11/10/20 2000)  SpO2: 98 % (11/10/20 2000) Vital Signs (24h Range):  Temp:  [97.2 °F (36.2 °C)-98.7 °F (37.1 °C)] 97.2 °F (36.2 °C)  Pulse:  [] 75  Resp:  [16-20] 17  SpO2:  [93 %-98 %] 98 %  BP: (135-151)/(60-69) 140/63     Weight: 53.4 kg (117 lb 11.6 oz)  Body mass index is 20.85 kg/m².    Intake/Output Summary (Last 24 hours) at 11/10/2020 2230  Last data filed at 11/10/2020 1800  Gross per 24 hour   Intake 680 ml   Output 550 ml   Net 130 ml      Physical Exam  Constitutional:       General: She is not in acute distress.     Appearance: Normal appearance.   HENT:      Head: Normocephalic and atraumatic.      Nose: Nose normal.   Eyes:      Extraocular Movements: Extraocular movements intact.      Conjunctiva/sclera: Conjunctivae normal.   Neck:      Musculoskeletal: Neck supple.   Cardiovascular:      Rate and Rhythm: Normal rate and regular rhythm.      Pulses: Normal pulses.      Heart sounds: Murmur present.   Pulmonary:      Effort: Pulmonary effort is normal.      Breath sounds: Normal breath sounds.   Abdominal:      General: Bowel sounds are normal.      Palpations: Abdomen is soft.   Musculoskeletal: Normal range of motion.   Skin:     General: Skin is warm and dry.   Neurological:      Mental Status: She is alert and oriented to person, place, and time.      Motor: Tremor present. No weakness.   Psychiatric:         Mood and Affect: Mood normal.         Behavior: Behavior normal.         Significant Labs: All pertinent labs within the past 24 hours have been reviewed.    Significant Imaging: I have  reviewed and interpreted all pertinent imaging results/findings within the past 24 hours.

## 2020-11-11 NOTE — PT/OT/SLP PROGRESS
Occupational Therapy   Treatment and Discharge    Name: Katarina Rivera  MRN: [id number]  Admitting Diagnosis:  GI bleed  2 Days Post-Op    Recommendations:     Discharge Recommendations: home health OT, home health PT  Discharge Equipment Recommendations:  none  Barriers to discharge:  None    Assessment:     Katarina Rivera is a 86 y.o. female with a medical diagnosis of GI bleed.  She presents with improved ability to complete ADLs. Pt stated that she no longer wants OT to follow in the hospital but agreed to continue with plans for HHOT to assess in the home environment. Performance deficits affecting function are  .     Rehab Prognosis:  Good; Pt has asked for discontinuation of OT services and is at/near baseline. D/C OT    Plan:     Patient to be seen 5 x/week to address the above listed problems via self-care/home management, therapeutic activities, therapeutic exercises  · Plan of Care Expires: 12/08/20  · Plan of Care Reviewed with: patient    Subjective     Pain/Comfort:  · Pain Rating 1: 0/10    Objective:     Communicated with: nsg prior to session.  Patient found HOB elevated with telemetry, peripheral IV upon OT entry to room.    General Precautions: Standard, fall   Orthopedic Precautions:N/A   Braces: N/A     Occupational Performance:     Bed Mobility:    · Patient completed Rolling/Turning to Right with modified independence  · Patient completed Scooting/Bridging with modified independence  · Patient completed Supine to Sit with modified independence  · Patient completed Sit to Supine with modified independence     Functional Mobility/Transfers:  · Patient completed Sit <> Stand Transfer with modified independence and supervision  with  no assistive device   Functional Mobility: Pt with good dynamic seated and standing balance.  ·      Activities of Daily Living:  · Lower Body Dressing: modified independence to don/doff B sock seated EOB      [facility] 6 Click ADL: 24    Treatment & Education:  Pt educated on  role of OT and POC.   Pt performing skills as listed above.   Pt completed 1x10 reps BUE alternating punches with good force with x1 v/c; 1x10 reps BUE shoulder flexion and cross body reaches while seated EOB. Pt declined grooming this session 2/2 wanting to take a nap but agreeable to demonstrating simulated grooming in stance at sink with no deficits noted. Pt reports she is at/near baseline.     Patient left  with all lines intact, call button in reach and nsg notifiedEducation:      GOALS:   Multidisciplinary Problems     Occupational Therapy Goals        Problem: Occupational Therapy Goal    Goal Priority Disciplines Outcome Interventions   Occupational Therapy Goal     OT, PT/OT Ongoing, Progressing    Description: Goals to be met by: 11/29/2020    Patient will increase functional independence with ADLs by performing:    UE Dressing with [place].  LE Dressing with Modified [place].  Grooming while standing at sink with Modified [place].  Toileting from toilet with Modified [place] for hygiene and clothing management.   Toilet transfer to toilet with Modified [place].                     Time Tracking:     OT Date of Treatment: 11/11/20  OT Start Time: 1405  OT Stop Time: 1417  OT Total Time (min): 12 min    Billable Minutes:Therapeutic Activity 12    Sanaz Dubois OT  11/11/2020

## 2020-11-11 NOTE — ASSESSMENT & PLAN NOTE
History of DVT (deep vein thrombosis)  1/2019  Bilateral  As discussed above, anticoagulation on hold for now

## 2020-11-11 NOTE — ASSESSMENT & PLAN NOTE
Chronic--25 years  Has been stable for years  Cont sulfasalazine  Consult GI - C-scope done with extensive disease  As discussed above

## 2020-11-11 NOTE — PROGRESS NOTES
[facility]  Gastroenterology  Progress Note    Patient Name: Katarina Rivera  MRN: [id number]  Admission Date: 11/7/2020  Hospital Length of Stay: 4 days  Code Status: Full Code   Attending Provider: Christine Baer MD  Consulting Provider: Lyle Wilburn MD  Primary Care Physician: Ethan Denton MD  Principal Problem: GI bleed      Subjective:     Interval History:   No further bleeding. Having some diarrhea. No abd pain. No radiating symptmos.    Asking again about her options regarding surgery etc, biologic therapy etc.    Review of Systems   Constitutional: Positive for appetite change and fatigue. Negative for fever.   Respiratory: Negative for choking and chest tightness.    Gastrointestinal: Positive for abdominal distention. Negative for abdominal pain, blood in stool and vomiting.   Skin: Positive for pallor. Negative for color change.     Objective:     Vital Signs (Most Recent):  Temp: 98 °F (36.7 °C) (11/11/20 1554)  Pulse: 103 (11/11/20 1554)  Resp: 20 (11/11/20 1554)  BP: 134/63 (11/11/20 1554)  SpO2: (!) 94 % (11/11/20 1554) Vital Signs (24h Range):  Temp:  [97.2 °F (36.2 °C)-99 °F (37.2 °C)] 98 °F (36.7 °C)  Pulse:  [] 103  Resp:  [17-20] 20  SpO2:  [94 %-98 %] 94 %  BP: (130-163)/(60-67) 134/63     Weight: 53.4 kg (117 lb 11.6 oz) (11/10/20 0424)  Body mass index is 20.85 kg/m².      Intake/Output Summary (Last 24 hours) at 11/11/2020 1713  Last data filed at 11/11/2020 1606  Gross per 24 hour   Intake 580 ml   Output --   Net 580 ml       Lines/Drains/Airways     Peripheral Intravenous Line                 Peripheral IV - Single Lumen 11/10/20 2340 22 G Left;Posterior Forearm less than 1 day                Physical Exam  Constitutional:       Appearance: She is not ill-appearing.      Comments: Chronically ill appearing   HENT:      Head: Normocephalic and atraumatic.   Eyes:      General: No scleral icterus.     Conjunctiva/sclera: Conjunctivae normal.   Abdominal:       Palpations: Abdomen is soft. There is no mass.      Tenderness: There is no abdominal tenderness. There is no guarding.   Neurological:      Mental Status: She is alert.   Psychiatric:         Mood and Affect: Mood normal.         Behavior: Behavior normal.         Significant Labs:  Amylase: No results for input(s): AMYLASE in the last 48 hours.  Blood Culture: No results for input(s): LABBLOO in the last 48 hours.  CBC:   Recent Labs   Lab 11/10/20  0745 11/11/20  0822   WBC 7.28 8.81   HGB 9.0* 9.7*   HCT 30.8* 33.6*    247     CMP:   Recent Labs   Lab 11/11/20  0823   GLU 86   CALCIUM 8.8   ALBUMIN 3.2*   PROT 7.2   *   K 5.2*   CO2 16*      BUN 9   CREATININE 1.0   ALKPHOS 79   ALT 5*   AST 13   BILITOT 0.8     Coagulation:   Recent Labs   Lab 11/11/20  0823   INR 1.1         Significant Imaging:  Imaging results within the past 24 hours have been reviewed.    Assessment/Plan:     History of ulcerative colitis  Complicated history of UC.  Follows with Dr. San.  Reviewed last colonoscopy 2015, showed fairly significant sigmoid stricture (required upper gi scope to traverse).  Also, there was apparently a large adenomatous polyp found. This was not removed. This is still present.  She saw CRS and it was recommended to have total colectomy, but she deferred.    Colonoscopy showing active pancolitis with areas of nodularity and inflammatory polyps as well as deep rectal ulcer.  Suspect bleeding is multifactorial, mainly driven by active pancolitis but also inflammatory polyps      Recs:  Follow up pathology  OK to resume anticoag from my perspective.  She needs follow up with colorectal surgery after discharge, I have placed request.    Ok for discharge from GI perspective    Once she is seen by colorectal, I will arrange a plan going forward.              Thank you for your consult. I will follow-up with patient. Please contact us if you have any additional questions.    Lyle Wilburn,  MD  Gastroenterology  [facility]

## 2020-11-11 NOTE — PROGRESS NOTES
[facility] Hospital Medicine  Progress Note    Patient Name: Katarina Rivera  MRN: [id number]  Patient Class: IP- Inpatient   Admission Date: 11/7/2020  Length of Stay: 3 days  Attending Physician: Christine Baer MD  Primary Care Provider: Ethan Denton MD        Subjective:     Principal Problem:GI bleed        HPI:  Ms. Rivera is an 85 yo female with a pertinent past medical history of ulcerative colitis (stable for several years), bilateral PE and DVT 1/2019 (on xarelto) who presented [place] ED with bloody diarrhea. The patient states she has been seeing bright red blood in her stool for about a month but it got worse over the last few days. She endorses occasional SOB since the bleeding started. The patient reports she fell on Friday after her legs were weak and gave out. She is noted to have bruising to her forehead and face. No abdominal pain or vomiting. No chills, fevers, body aches. In the ED she was found to have bright red blood in her rectum. Her vitals were stable. Labs revealed Hb 5.7 and Hct 22. She received 2 units of PRBC. Her D Dimer was elevated at 4.55.  Stool studies were sent - rotavirus Ag negative; occult blood positive; stool WBC, O&P, giardia/crypto in process.  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 The patient was transferred and admitted to [facility] hospital medicine service for GI services.       Overview/Hospital Course:  Admitted with GI bleed that improved some with holding Xeralto S/p colonoscopy, severe (Daley Score 3) pancolitis ulcerative colitis. S/p biopsied polypoid lesion in the sigmoid colon, caecum. Suspected large and growing and oozing inflammatory polyp. It was recommended in the past to undergo total abdominal colectomy, and that would be the first choice of next step, as these lesions will be very difficult to perform surveillance on. Awaits GI recommendation.     Interval  History: No reported bleeding, could not tolerate IV KCL.    Review of Systems   Constitutional: Negative for chills.   Respiratory: Negative for shortness of breath.    Gastrointestinal: Negative for abdominal pain and blood in stool.     Objective:     Vital Signs (Most Recent):  Temp: 97.2 °F (36.2 °C) (11/10/20 2000)  Pulse: 75 (11/10/20 2000)  Resp: 17 (11/10/20 2000)  BP: (!) 140/63 (11/10/20 2000)  SpO2: 98 % (11/10/20 2000) Vital Signs (24h Range):  Temp:  [97.2 °F (36.2 °C)-98.7 °F (37.1 °C)] 97.2 °F (36.2 °C)  Pulse:  [] 75  Resp:  [16-20] 17  SpO2:  [93 %-98 %] 98 %  BP: (135-151)/(60-69) 140/63     Weight: 53.4 kg (117 lb 11.6 oz)  Body mass index is 20.85 kg/m².    Intake/Output Summary (Last 24 hours) at 11/10/2020 2230  Last data filed at 11/10/2020 1800  Gross per 24 hour   Intake 680 ml   Output 550 ml   Net 130 ml      Physical Exam  Constitutional:       General: She is not in acute distress.     Appearance: Normal appearance.   HENT:      Head: Normocephalic and atraumatic.      Nose: Nose normal.   Eyes:      Extraocular Movements: Extraocular movements intact.      Conjunctiva/sclera: Conjunctivae normal.   Neck:      Musculoskeletal: Neck supple.   Cardiovascular:      Rate and Rhythm: Normal rate and regular rhythm.      Pulses: Normal pulses.      Heart sounds: Murmur present.   Pulmonary:      Effort: Pulmonary effort is normal.      Breath sounds: Normal breath sounds.   Abdominal:      General: Bowel sounds are normal.      Palpations: Abdomen is soft.   Musculoskeletal: Normal range of motion.   Skin:     General: Skin is warm and dry.   Neurological:      Mental Status: She is alert and oriented to person, place, and time.      Motor: Tremor present. No weakness.   Psychiatric:         Mood and Affect: Mood normal.         Behavior: Behavior normal.         Significant Labs: All pertinent labs within the past 24 hours have been reviewed.    Significant Imaging: I have reviewed and  interpreted all pertinent imaging results/findings within the past 24 hours.      Assessment/Plan:      * GI bleed  Bleeding for a month--Hgb 5.7 on arrival  On xarelto for hx PE, DVT  Holding xarelto   Got 2 units of PRBC   Monitor Hgb - has been stable  Consulted GI - colonosocpy with extensive pancolitis UC on 11/9  Recommendation for colectomy declined by patient  Discussed restarting Xarelto - discussed risk/benefit  Patient and daughter at bedside wish to think about it  May need IVC filter if no anticoagulation  Continue to monitor and re-address again tomorrow    Ulcerative colitis  Chronic--25 years  Has been stable for years  Cont sulfasalazine  Consult GI - C-scope done with extensive disease  As discussed above    History of pulmonary embolism  History of DVT (deep vein thrombosis)  1/2019  Bilateral  As discussed above, anticoagulation on hold for now    Hypokalemia  Hypomagnesemia  Will replete and monitor    Weakness  Secondary to anemia  Patient is normally independent, still drives  Consult PT/OT    History of ulcerative colitis  As discussed above    History of DVT (deep vein thrombosis)  As discussed above    Diastolic dysfunction  Compensated, monitor    Tremor  Chronic, hereditary  Stable    VTE Risk Mitigation (From admission, onward)         Ordered     IP VTE HIGH RISK PATIENT  Once      11/07/20 1832     Place sequential compression device  Until discontinued      11/07/20 1832                  Christine Baer MD  Department of Hospital Medicine   [facility]

## 2020-11-12 ENCOUNTER — TELEPHONE (OUTPATIENT)
Dept: FAMILY MEDICINE | Facility: CLINIC | Age: 85
End: 2020-11-12

## 2020-11-12 VITALS
SYSTOLIC BLOOD PRESSURE: 115 MMHG | HEART RATE: 82 BPM | OXYGEN SATURATION: 96 % | HEIGHT: 63 IN | DIASTOLIC BLOOD PRESSURE: 56 MMHG | WEIGHT: 117.75 LBS | BODY MASS INDEX: 20.86 KG/M2 | TEMPERATURE: 97 F | RESPIRATION RATE: 18 BRPM

## 2020-11-12 LAB
ANION GAP SERPL CALC-SCNC: 11 MMOL/L (ref 8–16)
BACTERIA BLD CULT: NORMAL
BACTERIA BLD CULT: NORMAL
BASOPHILS # BLD AUTO: 0.07 K/UL (ref 0–0.2)
BASOPHILS NFR BLD: 0.9 % (ref 0–1.9)
BUN SERPL-MCNC: 9 MG/DL (ref 8–23)
CALCIUM SERPL-MCNC: 8.6 MG/DL (ref 8.7–10.5)
CHLORIDE SERPL-SCNC: 105 MMOL/L (ref 95–110)
CO2 SERPL-SCNC: 20 MMOL/L (ref 23–29)
CREAT SERPL-MCNC: 0.9 MG/DL (ref 0.5–1.4)
DIFFERENTIAL METHOD: ABNORMAL
EOSINOPHIL # BLD AUTO: 0.2 K/UL (ref 0–0.5)
EOSINOPHIL NFR BLD: 2.6 % (ref 0–8)
ERYTHROCYTE [DISTWIDTH] IN BLOOD BY AUTOMATED COUNT: 33.6 % (ref 11.5–14.5)
EST. GFR  (AFRICAN AMERICAN): >60 ML/MIN/1.73 M^2
EST. GFR  (NON AFRICAN AMERICAN): 58 ML/MIN/1.73 M^2
GAMMA INTERFERON BACKGROUND BLD IA-ACNC: 0.03 IU/ML
GLUCOSE SERPL-MCNC: 77 MG/DL (ref 70–110)
HCT VFR BLD AUTO: 33.1 % (ref 37–48.5)
HGB BLD-MCNC: 9.5 G/DL (ref 12–16)
IMM GRANULOCYTES # BLD AUTO: 0.14 K/UL (ref 0–0.04)
IMM GRANULOCYTES NFR BLD AUTO: 1.7 % (ref 0–0.5)
LYMPHOCYTES # BLD AUTO: 1.5 K/UL (ref 1–4.8)
LYMPHOCYTES NFR BLD: 18.5 % (ref 18–48)
M TB IFN-G CD4+ BCKGRND COR BLD-ACNC: 0.07 IU/ML
MAGNESIUM SERPL-MCNC: 2.1 MG/DL (ref 1.6–2.6)
MCH RBC QN AUTO: 20.5 PG (ref 27–31)
MCHC RBC AUTO-ENTMCNC: 28.7 G/DL (ref 32–36)
MCV RBC AUTO: 71 FL (ref 82–98)
MITOGEN IGNF BCKGRD COR BLD-ACNC: >10 IU/ML
MONOCYTES # BLD AUTO: 1 K/UL (ref 0.3–1)
MONOCYTES NFR BLD: 12.1 % (ref 4–15)
NEUTROPHILS # BLD AUTO: 5.2 K/UL (ref 1.8–7.7)
NEUTROPHILS NFR BLD: 64.2 % (ref 38–73)
NRBC BLD-RTO: 0 /100 WBC
PLATELET # BLD AUTO: 222 K/UL (ref 150–350)
PMV BLD AUTO: ABNORMAL FL (ref 9.2–12.9)
POTASSIUM SERPL-SCNC: 5.1 MMOL/L (ref 3.5–5.1)
RBC # BLD AUTO: 4.64 M/UL (ref 4–5.4)
SODIUM SERPL-SCNC: 136 MMOL/L (ref 136–145)
TB GOLD PLUS: NEGATIVE
TB2 - NIL: 0.08 IU/ML
WBC # BLD AUTO: 8.11 K/UL (ref 3.9–12.7)

## 2020-11-12 PROCEDURE — 25000003 PHARM REV CODE 250: Performed by: HOSPITALIST

## 2020-11-12 PROCEDURE — G0008 ADMIN INFLUENZA VIRUS VAC: HCPCS | Performed by: HOSPITALIST

## 2020-11-12 PROCEDURE — 25000003 PHARM REV CODE 250: Performed by: NURSE PRACTITIONER

## 2020-11-12 PROCEDURE — 85025 COMPLETE CBC W/AUTO DIFF WBC: CPT

## 2020-11-12 PROCEDURE — 90694 VACC AIIV4 NO PRSRV 0.5ML IM: CPT | Performed by: HOSPITALIST

## 2020-11-12 PROCEDURE — 83735 ASSAY OF MAGNESIUM: CPT

## 2020-11-12 PROCEDURE — 25000003 PHARM REV CODE 250: Performed by: FAMILY MEDICINE

## 2020-11-12 PROCEDURE — 80048 BASIC METABOLIC PNL TOTAL CA: CPT

## 2020-11-12 PROCEDURE — 63600175 PHARM REV CODE 636 W HCPCS: Performed by: HOSPITALIST

## 2020-11-12 PROCEDURE — 63600175 PHARM REV CODE 636 W HCPCS: Performed by: FAMILY MEDICINE

## 2020-11-12 PROCEDURE — 90472 IMMUNIZATION ADMIN EACH ADD: CPT | Performed by: HOSPITALIST

## 2020-11-12 PROCEDURE — 63600175 PHARM REV CODE 636 W HCPCS: Performed by: NURSE PRACTITIONER

## 2020-11-12 PROCEDURE — C9113 INJ PANTOPRAZOLE SODIUM, VIA: HCPCS | Performed by: FAMILY MEDICINE

## 2020-11-12 PROCEDURE — 36415 COLL VENOUS BLD VENIPUNCTURE: CPT

## 2020-11-12 PROCEDURE — 90471 IMMUNIZATION ADMIN: CPT | Performed by: HOSPITALIST

## 2020-11-12 RX ORDER — FAMOTIDINE 10 MG/ML
20 INJECTION INTRAVENOUS ONCE
Status: COMPLETED | OUTPATIENT
Start: 2020-11-12 | End: 2020-11-12

## 2020-11-12 RX ORDER — PANTOPRAZOLE SODIUM 40 MG/1
40 TABLET, DELAYED RELEASE ORAL DAILY
Status: DISCONTINUED | OUTPATIENT
Start: 2020-11-12 | End: 2020-11-12 | Stop reason: HOSPADM

## 2020-11-12 RX ORDER — METOPROLOL TARTRATE 50 MG/1
100 TABLET ORAL DAILY
Status: DISCONTINUED | OUTPATIENT
Start: 2020-11-12 | End: 2020-11-12 | Stop reason: HOSPADM

## 2020-11-12 RX ORDER — PANTOPRAZOLE SODIUM 40 MG/1
40 TABLET, DELAYED RELEASE ORAL DAILY
Qty: 30 TABLET | Refills: 1 | Status: SHIPPED | OUTPATIENT
Start: 2020-11-13 | End: 2021-03-09

## 2020-11-12 RX ORDER — METOPROLOL TARTRATE 100 MG/1
100 TABLET ORAL DAILY
Start: 2020-11-12 | End: 2021-07-12 | Stop reason: SDUPTHER

## 2020-11-12 RX ORDER — DIPHENHYDRAMINE HYDROCHLORIDE 50 MG/ML
12.5 INJECTION INTRAMUSCULAR; INTRAVENOUS ONCE
Status: COMPLETED | OUTPATIENT
Start: 2020-11-12 | End: 2020-11-12

## 2020-11-12 RX ADMIN — FAMOTIDINE 20 MG: 10 INJECTION, SOLUTION INTRAVENOUS at 01:11

## 2020-11-12 RX ADMIN — METOPROLOL TARTRATE 100 MG: 50 TABLET, FILM COATED ORAL at 10:11

## 2020-11-12 RX ADMIN — DIPHENHYDRAMINE HYDROCHLORIDE 12.5 MG: 50 INJECTION, SOLUTION INTRAMUSCULAR; INTRAVENOUS at 01:11

## 2020-11-12 RX ADMIN — PANTOPRAZOLE SODIUM 40 MG: 40 TABLET, DELAYED RELEASE ORAL at 10:11

## 2020-11-12 RX ADMIN — ATORVASTATIN CALCIUM 20 MG: 20 TABLET, FILM COATED ORAL at 10:11

## 2020-11-12 RX ADMIN — A/SINGAPORE/GP1908/2015 IVR-180 (AN A/MICHIGAN/45/2015 (H1N1)PDM09-LIKE VIRUS, A/HONG KONG/4801/2014, NYMC X-263B (H3N2) (AN A/HONG KONG/4801/2014-LIKE VIRUS), AND B/BRISBANE/60/2008, WILD TYPE (A B/BRISBANE/60/2008-LIKE VIRUS) 0.5 ML: 15; 15; 15 INJECTION, SUSPENSION INTRAMUSCULAR at 02:11

## 2020-11-12 RX ADMIN — SULFASALAZINE 500 MG: 500 TABLET ORAL at 10:11

## 2020-11-12 NOTE — PLAN OF CARE
Iv removed. Tip intact. Patient tolerated. Telemetry removed. Home discharge paper work at bedside. Home meds at bedside.

## 2020-11-12 NOTE — PROGRESS NOTES
[facility]                    Pharmacy       Discharge Medication Education    Patient ACCEPTED medication education. Pharmacy has provided education on the name, indication, and possible side effects of the medication(s) prescribed, using teach-back method.     The following medications have also been discussed, during this admission.        Medication List        START taking these medications      pantoprazole 40 MG tablet  Commonly known as: PROTONIX  Take 1 tablet (40 mg total) by mouth once daily.  Start taking on: November 13, 2020            CHANGE how you take these medications      metoprolol tartrate 100 MG tablet  Commonly known as: LOPRESSOR  Take 1 tablet (100 mg total) by mouth once daily.  What changed: when to take this     rivaroxaban 20 mg Tab  Commonly known as: XARELTO  Take 1 tablet (20 mg total) by mouth daily with dinner or evening meal.  What changed: Another medication with the same name was removed. Continue taking this medication, and follow the directions you see here.            CONTINUE taking these medications      atorvastatin 20 MG tablet  Commonly known as: LIPITOR     furosemide 40 MG tablet  Commonly known as: LASIX  TAKE ONE TABLET BY MOUTH EVERY DAY     IRON 100 PLUS Tab  Generic drug: iron-vit c-b12-folic acid  Take 1 tablet by mouth once daily.     latanoprost 0.005 % ophthalmic solution     nitroGLYCERIN 0.4 MG SL tablet  Commonly known as: NITROSTAT  Place 1 tablet (0.4 mg total) under the tongue every 5 (five) minutes as needed for Chest pain.     sulfaSALAzine 500 mg Tab  Commonly known as: AZULFIDINE  TAKE ONE TABLET BY MOUTH TWICE A DAY               Where to Get Your Medications        These medications were sent to [facility]  [address], [place] [address]      Hours: Mon-Fri, 8a-5:30p Phone: [phone]   pantoprazole 40 MG tablet       Information about where to get these medications is not yet available    Ask your  nurse or doctor about these medications  metoprolol tartrate 100 MG tablet          Thank you  Julio Gongora, PharmD  [phone]

## 2020-11-12 NOTE — PLAN OF CARE
[place] sent hh orders to [facility] via [facility] per pt preference.     11/12/20 1044   Post-Acute Status   Post-Acute Authorization Home Health   Home Health Status Referrals Sent   Patient choice form signed by patient/caregiver List with quality metrics by geographic area provided   Discharge Plan   Discharge Plan [facility]

## 2020-11-12 NOTE — SUBJECTIVE & OBJECTIVE
Interval History: No reported bleeding, still deciding what to do.    Review of Systems   Constitutional: Negative for chills.   Respiratory: Negative for shortness of breath.    Gastrointestinal: Negative for abdominal pain and blood in stool.     Objective:     Vital Signs (Most Recent):  Temp: 97.7 °F (36.5 °C) (11/11/20 2031)  Pulse: 107 (11/11/20 2031)  Resp: 16 (11/11/20 2031)  BP: 127/62 (11/11/20 2031)  SpO2: (!) 94 % (11/11/20 2031) Vital Signs (24h Range):  Temp:  [97.5 °F (36.4 °C)-99 °F (37.2 °C)] 97.7 °F (36.5 °C)  Pulse:  [] 107  Resp:  [16-20] 16  SpO2:  [94 %-95 %] 94 %  BP: (127-163)/(60-67) 127/62     Weight: 53.4 kg (117 lb 11.6 oz)  Body mass index is 20.85 kg/m².    Intake/Output Summary (Last 24 hours) at 11/11/2020 2351  Last data filed at 11/11/2020 1814  Gross per 24 hour   Intake 525 ml   Output --   Net 525 ml      Physical Exam  Constitutional:       General: She is not in acute distress.     Appearance: Normal appearance.   HENT:      Head: Normocephalic and atraumatic.      Nose: Nose normal.   Eyes:      Extraocular Movements: Extraocular movements intact.      Conjunctiva/sclera: Conjunctivae normal.   Neck:      Musculoskeletal: Neck supple.   Cardiovascular:      Rate and Rhythm: Normal rate and regular rhythm.      Pulses: Normal pulses.      Heart sounds: Murmur present.   Pulmonary:      Effort: Pulmonary effort is normal.      Breath sounds: Normal breath sounds.   Abdominal:      General: Bowel sounds are normal.      Palpations: Abdomen is soft.   Musculoskeletal: Normal range of motion.   Skin:     General: Skin is warm and dry.   Neurological:      Mental Status: She is alert and oriented to person, place, and time.      Motor: Tremor present. No weakness.   Psychiatric:         Mood and Affect: Mood normal.         Behavior: Behavior normal.         Significant Labs: All pertinent labs within the past 24 hours have been reviewed.    Significant Imaging: I have  reviewed and interpreted all pertinent imaging results/findings within the past 24 hours.

## 2020-11-12 NOTE — PLAN OF CARE
Pt AAOx4, no c/o of pain. Pt has bad tremors. Pt broke off some hives all over her back and bilateral buttocks and groin, Benadryl given per MD order. Hives resolved. VSS, no acute distress noted. Cont to monitor.    Problem: Adult Inpatient Plan of Care  Goal: Plan of Care Review  Description: Chart and Care plan reviewed.    Outcome: Ongoing, Progressing     Problem: Adjustment to Illness (Gastrointestinal Bleeding)  Goal: Optimal Coping with Acute Illness  Outcome: Ongoing, Progressing     Problem: Bleeding (Gastrointestinal Bleeding)  Goal: Hemostasis  Outcome: Ongoing, Progressing     Problem: Fall Injury Risk  Goal: Absence of Fall and Fall-Related Injury  Outcome: Ongoing, Progressing     Problem: Skin Injury Risk Increased  Goal: Skin Health and Integrity  Outcome: Ongoing, Progressing

## 2020-11-12 NOTE — NURSING
TAMY Morelos notified that pt just broke out some hives all over on bilateral buttock/groin area as well as her back. No blister noted. Pt c/o of itching really bad. Awaiting order. Cont to monitor.

## 2020-11-12 NOTE — ASSESSMENT & PLAN NOTE
Bleeding for a month--Hgb 5.7 on arrival  On xarelto for hx PE, DVT  Holding xarelto   Got 2 units of PRBC   Monitor Hgb - has been stable  Consulted GI - colonosocpy with extensive pancolitis UC on 11/9  Recommendation for colectomy declined by patient  Discussed restarting Xarelto - discussed risk/benefit  Patient and daughter at bedside wish to think about it  May need IVC filter if no anticoagulation  Continue to monitor and re-addressed again today  Discussed twice with patient and after discussion with GI  Will resume Xarelto and monitor

## 2020-11-12 NOTE — TELEPHONE ENCOUNTER
----- Message from Maria Alejandra Jaquez sent at 2020 11:13 AM CST -----  Regarding: home health order  Contact: [facility]  Katarina Rivera  MRN: [id number]  : 1934  PCP: Ethan Denton  Home Phone      [phone]  Work Phone      Not on file.  Mobile          Not on file.      MESSAGE:   Patient being discharged today from [facility], being sent home with Home health orders, would like to know if Dr. Denton will sign her orders for [facility] home health    Phone:  [phone]

## 2020-11-12 NOTE — PT/OT/SLP PROGRESS
Physical Therapy      Patient Name:  Katarina Rviera   MRN:  [id number]    Patient not seen today secondary to (pt refusal,states she is going home today). Will follow-up n/a.    Eze Orellana, PTA

## 2020-11-12 NOTE — PLAN OF CARE
Discharge orders noted. Additional clinical references attached.    Patient's discharge instructions given by bedside RN and reviewed via this VN.  Education provided on new medication, diagnosis, and follow-up appointments. Patient verbalized understanding. All questions answered. Transport to [facility] requested. Floor nurse notified.

## 2020-11-12 NOTE — PROGRESS NOTES
[facility] Hospital Medicine  Progress Note    Patient Name: Katarina Rivera  MRN: [id number]  Patient Class: IP- Inpatient   Admission Date: 11/7/2020  Length of Stay: 4 days  Attending Physician: Christine Baer MD  Primary Care Provider: Ethan Denton MD        Subjective:     Principal Problem:GI bleed        HPI:  Ms. Rivera is an 87 yo female with a pertinent past medical history of ulcerative colitis (stable for several years), bilateral PE and DVT 1/2019 (on xarelto) who presented [place] ED with bloody diarrhea. The patient states she has been seeing bright red blood in her stool for about a month but it got worse over the last few days. She endorses occasional SOB since the bleeding started. The patient reports she fell on Friday after her legs were weak and gave out. She is noted to have bruising to her forehead and face. No abdominal pain or vomiting. No chills, fevers, body aches. In the ED she was found to have bright red blood in her rectum. Her vitals were stable. Labs revealed Hb 5.7 and Hct 22. She received 2 units of PRBC. Her D Dimer was elevated at 4.55.  Stool studies were sent - rotavirus Ag negative; occult blood positive; stool WBC, O&P, giardia/crypto in process.  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 The patient was transferred and admitted to [facility] hospital medicine service for GI services.       Overview/Hospital Course:  Admitted with GI bleed that improved some with holding Xeralto S/p colonoscopy, severe (Daley Score 3) pancolitis ulcerative colitis. S/p biopsied polypoid lesion in the sigmoid colon, caecum. Suspected large and growing and oozing inflammatory polyp. It was recommended in the past to undergo total abdominal colectomy, and that would be the first choice of next step, as these lesions will be very difficult to perform surveillance on. Awaits GI recommendation.     Interval  History: No reported bleeding, still deciding what to do.    Review of Systems   Constitutional: Negative for chills.   Respiratory: Negative for shortness of breath.    Gastrointestinal: Negative for abdominal pain and blood in stool.     Objective:     Vital Signs (Most Recent):  Temp: 97.7 °F (36.5 °C) (11/11/20 2031)  Pulse: 107 (11/11/20 2031)  Resp: 16 (11/11/20 2031)  BP: 127/62 (11/11/20 2031)  SpO2: (!) 94 % (11/11/20 2031) Vital Signs (24h Range):  Temp:  [97.5 °F (36.4 °C)-99 °F (37.2 °C)] 97.7 °F (36.5 °C)  Pulse:  [] 107  Resp:  [16-20] 16  SpO2:  [94 %-95 %] 94 %  BP: (127-163)/(60-67) 127/62     Weight: 53.4 kg (117 lb 11.6 oz)  Body mass index is 20.85 kg/m².    Intake/Output Summary (Last 24 hours) at 11/11/2020 2351  Last data filed at 11/11/2020 1814  Gross per 24 hour   Intake 525 ml   Output --   Net 525 ml      Physical Exam  Constitutional:       General: She is not in acute distress.     Appearance: Normal appearance.   HENT:      Head: Normocephalic and atraumatic.      Nose: Nose normal.   Eyes:      Extraocular Movements: Extraocular movements intact.      Conjunctiva/sclera: Conjunctivae normal.   Neck:      Musculoskeletal: Neck supple.   Cardiovascular:      Rate and Rhythm: Normal rate and regular rhythm.      Pulses: Normal pulses.      Heart sounds: Murmur present.   Pulmonary:      Effort: Pulmonary effort is normal.      Breath sounds: Normal breath sounds.   Abdominal:      General: Bowel sounds are normal.      Palpations: Abdomen is soft.   Musculoskeletal: Normal range of motion.   Skin:     General: Skin is warm and dry.   Neurological:      Mental Status: She is alert and oriented to person, place, and time.      Motor: Tremor present. No weakness.   Psychiatric:         Mood and Affect: Mood normal.         Behavior: Behavior normal.         Significant Labs: All pertinent labs within the past 24 hours have been reviewed.    Significant Imaging: I have reviewed and  interpreted all pertinent imaging results/findings within the past 24 hours.      Assessment/Plan:      * GI bleed  Bleeding for a month--Hgb 5.7 on arrival  On xarelto for hx PE, DVT  Holding xarelto   Got 2 units of PRBC   Monitor Hgb - has been stable  Consulted GI - colonosocpy with extensive pancolitis UC on 11/9  Recommendation for colectomy declined by patient  Discussed restarting Xarelto - discussed risk/benefit  Patient and daughter at bedside wish to think about it  May need IVC filter if no anticoagulation  Continue to monitor and re-addressed again today  Discussed twice with patient and after discussion with GI  Will resume Xarelto and monitor    Ulcerative colitis  Chronic--25 years  Has been stable for years  Cont sulfasalazine  Consult GI - C-scope done with extensive disease  As discussed above    History of pulmonary embolism  History of DVT (deep vein thrombosis)  1/2019  Bilateral  As discussed above, anticoagulation on hold for now    Hypokalemia  Hypomagnesemia  Corrected, monitor    Weakness  Secondary to anemia  Patient is normally independent, still drives  Consult PT/OT    History of ulcerative colitis  As discussed above    History of DVT (deep vein thrombosis)  As discussed above    Diastolic dysfunction  Compensated, monitor    Tremor  Chronic, hereditary  Stable      VTE Risk Mitigation (From admission, onward)         Ordered     rivaroxaban tablet 20 mg  With dinner      11/11/20 1750     IP VTE HIGH RISK PATIENT  Once      11/07/20 1832     Place sequential compression device  Until discontinued      11/07/20 1832                Christine Baer MD  Department of Hospital Medicine   [facility]

## 2020-11-12 NOTE — PLAN OF CARE
Plan of care reviewed with patient and daughter. Both verbalized understanding. Patient is currently on room air. Telemetry applied. Zero true red alarms or ectopy. Bed locked and low,call light within reach, bed alarm on, non skid socks applied side rails x2, fall risk band applied. Instructed to call if needing assistance. Will continue to monitor.

## 2020-11-12 NOTE — PLAN OF CARE
[facility] HOME HEALTH ORDERS  FACE TO FACE ENCOUNTER    Patient Name: Katarina Rivera  YOB: 1934    PCP: Ethan Denton MD   PCP Address: [address]  / [place] [address]  PCP Phone Number: [phone]  PCP Fax: [phone]    Encounter Date: 11/12/2020    Admit to Home Health    Diagnoses:  Active Hospital Problems    Diagnosis  POA    *GI bleed [K92.2]  Yes     Priority: 1 - High    Ulcerative colitis [K51.90]  Yes     Priority: 2     History of pulmonary embolism [Z86.711]  Yes     Priority: 3     Hypokalemia [E87.6]  Yes    Weakness [R53.1]  Yes    History of ulcerative colitis [Z87.19]  Not Applicable    History of DVT (deep vein thrombosis) [Z86.718]  Not Applicable    Diastolic dysfunction [I51.89]  Yes    Tremor [R25.1]  Yes      Resolved Hospital Problems   No resolved problems to display.       Future Appointments   Date Time Provider Department Center   11/17/2020  1:30 PM Ethan Denton MD [facility]   12/3/2020 10:00 AM Augusto Kapadia MD [facility]     Follow-up Information     Colorectal surgery In 2 weeks.    Why: time:1:30           Lyle Wilburn MD In 3 weeks.    Specialty: Gastroenterology  Contact information:  [address]  [address]  [place] [address] [phone]                     I have seen and examined this patient face to face today. My clinical findings that support the need for the home health skilled services and home bound status are the following:  Weakness/numbness causing balance and gait disturbance due to Weakness/Debility making it taxing to leave home.    Allergies:Review of patient's allergies indicates:  No Known Allergies    Diet: cardiac diet    Activities: activity as tolerated    Nursing:   SN to complete comprehensive assessment including routine vital signs. Instruct on disease process and s/s of complications to report to MD. Review/verify medication list sent home with the  patient at time of discharge  and instruct patient/caregiver as needed. Frequency may be adjusted depending on start of care date.    Notify MD if SBP > 160 or < 90; DBP > 90 or < 50; HR > 120 or < 50; Temp > 101.      CONSULTS:    Physical Therapy to evaluate and treat. Evaluate for home safety and equipment needs; Establish/upgrade home exercise program. Perform / instruct on therapeutic exercises, gait training, transfer training, and Range of Motion.  Occupational Therapy to evaluate and treat. Evaluate home environment for safety and equipment needs. Perform/Instruct on transfers, ADL training, ROM, and therapeutic exercises.   to evaluate for community resources/long-range planning.  Aide to provide assistance with personal care, ADLs, and vital signs.      Medications: Review discharge medications with patient and family and provide education.      Current Discharge Medication List      START taking these medications    Details   pantoprazole (PROTONIX) 40 MG tablet Take 1 tablet (40 mg total) by mouth once daily.  Qty: 30 tablet, Refills: 1         CONTINUE these medications which have CHANGED    Details   metoprolol tartrate (LOPRESSOR) 100 MG tablet Take 1 tablet (100 mg total) by mouth once daily.    Comments: .         CONTINUE these medications which have NOT CHANGED    Details   rivaroxaban (XARELTO) 20 mg Tab Take 1 tablet (20 mg total) by mouth daily with dinner or evening meal.  Qty: 30 tablet, Refills: 11      sulfaSALAzine (AZULFIDINE) 500 mg Tab TAKE ONE TABLET BY MOUTH TWICE A DAY  Qty: 60 tablet, Refills: 5      atorvastatin (LIPITOR) 20 MG tablet Take 20 mg by mouth once daily.      furosemide (LASIX) 40 MG tablet TAKE ONE TABLET BY MOUTH EVERY DAY  Qty: 30 tablet, Refills: 11    Associated Diagnoses: Essential hypertension      iron-vit c-vit b12-folic acid (IRON-C PLUS) tablet Take 1 tablet by mouth once daily.  Qty: 30 tablet, Refills: 11      latanoprost 0.005 % ophthalmic  solution Place 1 drop into both eyes every evening.       nitroGLYCERIN (NITROSTAT) 0.4 MG SL tablet Place 1 tablet (0.4 mg total) under the tongue every 5 (five) minutes as needed for Chest pain.  Qty: 25 tablet, Refills: 11         STOP taking these medications       rivaroxaban (XARELTO) 15 mg (42)- 20 mg (9) tablet dose pack Comments:   Reason for Stopping:               I certify that this patient is confined to her home and needs intermittent skilled nursing care, physical therapy and occupational therapy.

## 2020-11-13 ENCOUNTER — TELEPHONE (OUTPATIENT)
Dept: FAMILY MEDICINE | Facility: CLINIC | Age: 85
End: 2020-11-13

## 2020-11-13 ENCOUNTER — PATIENT OUTREACH (OUTPATIENT)
Dept: ADMINISTRATIVE | Facility: CLINIC | Age: 85
End: 2020-11-13

## 2020-11-13 DIAGNOSIS — Z74.09 IMPAIRED MOBILITY: Primary | ICD-10-CM

## 2020-11-13 LAB — BACTERIA STL CULT: NORMAL

## 2020-11-13 PROCEDURE — G0180 PR HOME HEALTH MD CERTIFICATION: ICD-10-PCS | Mod: ,,, | Performed by: FAMILY MEDICINE

## 2020-11-13 PROCEDURE — G0180 MD CERTIFICATION HHA PATIENT: HCPCS | Mod: ,,, | Performed by: FAMILY MEDICINE

## 2020-11-13 NOTE — TELEPHONE ENCOUNTER
Pt refused MSW. Pt has Pt and Ot starting. Pt fell, but she is fine. Pt has stage 2 pressure ulcer that are treating duo derm every 5-7 days PRN. Pt has f/u appt next week.

## 2020-11-13 NOTE — TELEPHONE ENCOUNTER
----- Message from Maria Alejandra Jaquez sent at 2020  1:27 PM CST -----  Regarding: Home health  Contact: Eve Taylor  Katarina Rivera  MRN: [id number]  : 1934  PCP: Ethan Denton  Home Phone      [phone]  Work Phone      Not on file.  Mobile          Not on file.      MESSAGE:   Patient was admitted to , states they need to go over medication and discuss a new wound the patient has from a fall yesterday.    Phone:  [phone]

## 2020-11-13 NOTE — PATIENT INSTRUCTIONS
When You Have Gastrointestinal (GI) Bleeding    Blood in your vomit or stool can be a sign of gastrointestinal (GI) bleeding. GI bleeding can be scary. But the cause may not be serious. You should always see a doctor if GI bleeding occurs.  The GI tract  The GI tract is the path through which food travels in the body. Food passes from the mouth down the esophagus (the tube from the mouth to the stomach). Food begins to break down in the stomach. It then moves through the duodenum, the first part of the small intestine. Nutrients are absorbed as food travels through the small intestine. What is left passes into the colon (large intestine) as waste. The colon removes water from the waste. Waste continues from the colon to the rectum (where stool is stored). Waste then leaves the body through the anus.  Causes of GI bleeding  GI bleeding can be caused by many different problems. Some of the more common causes include:  · Swollen veins in the anus (hemorrhoids)  · Swollen veins in the esophagus (varices)  · Sore on the lining of the GI tract (ulcer)  · Cuts or scrapes in the mouth or throat  · Infection caused by germs such as bacteria or parasites  · Food allergies, such as milk allergy in young children  · Medicines  · Inflammation of the GI tract (gastritis or esophagitis)  · Colitis (Crohn's disease or ulcerative colitis)  · Cancer (tumors or polyps)  · Abnormal pouches in the colon (diverticula)  · Tears in the esophagus or anus  · Nosebleed  · Abnormal blood vessels in the GI tract (angiodysplasia)  Diagnosing the cause of blood in stool  If blood is coming out in your stool, you may have a lower GI tract problem or a very fast upper GI tract bleed. Bleeding from the GI tract can be bright red. Or it may look dark and tarry. Tests may also find blood in your stool that cant be seen with the eye (occult blood). To find out the cause, tests that may be ordered include:  · Blood tests. A blood sample is taken and  sent to a lab for exam.  · Hemoccult test. Checks a stool sample for blood.  · Stool culture. Checks a stool sample for bacteria or parasites.  · X-ray, ultrasound, or CT scan. Imaging tests that take pictures of the digestive tract.  · Colonoscopy or sigmoidoscopy. This test uses a flexible tube with a tiny camera. The tube is inserted through your anus into your rectum to see the inside of your colon. Your provider can also take a tiny tissue sample (biopsy) and treat a bleeding source  Diagnosing the cause of blood in vomit  If you are vomiting blood or something that looks like coffee grounds, you may have an upper GI tract problem. To find the cause, tests that may be done include:  · Upper Endoscopy. A flexible tube with a tiny camera is inserted through your mouth and throat to see inside your upper GI tract. This lets your provider take a tiny tissue sample (biopsy) and treat a bleeding source.  · Nasogastric lavage. This can tell if you have upper GI or lower GI bleeding.  · X-ray, ultrasound, or CT scan. Imaging tests that take pictures of your digestive tract.  · Upper GI series. X-rays of the upper part of your GI tract taken from inside your body.  · Enteroscopy. This sends a flexible tube or a small, swallowed capsule camera into your small intestine.  When to call your healthcare provider  Call your healthcare provider right away if you have any of the following:  · Bleeding from your mouth or anus that can't be stopped  · Fever of 100.4°F (38.0°) or higher  · Bleeding along with feeling lightheaded or dizzy  · Signs of fluid loss (dehydration). These include a dry, sticky mouth, decreased urine output; and very dark urine.  · Belly (abdominal) pain   Date Last Reviewed: 7/1/2016  © 5779-9243 [employer]. [address] [place] [address], [place] [address]. All rights reserved. This information is not intended as a substitute for professional medical care. Always follow your healthcare  professional's instructions.

## 2020-11-13 NOTE — TELEPHONE ENCOUNTER
----- Message from Karen Deutsch sent at 2020  1:21 PM CST -----  Contact: genna [email or web address]  Katarina Rivera  MRN: [id number]  : 1934  PCP: Ethan Denton  Home Phone      [phone]  Work Phone      Not on file.  Mobile          Not on file.      MESSAGE:   Georgie is calling to let doctor know she will be continuing home health twice a week starting next week for 4 weeks.    Also would like get a order for a transfer tub bench.    Phone:[phone]

## 2020-11-16 LAB
FINAL PATHOLOGIC DIAGNOSIS: NORMAL
GROSS: NORMAL
Lab: NORMAL
MICROSCOPIC EXAM: NORMAL

## 2020-11-17 ENCOUNTER — OFFICE VISIT (OUTPATIENT)
Dept: FAMILY MEDICINE | Facility: CLINIC | Age: 85
End: 2020-11-17
Payer: MEDICARE

## 2020-11-17 VITALS
TEMPERATURE: 97 F | HEIGHT: 63 IN | WEIGHT: 117.75 LBS | DIASTOLIC BLOOD PRESSURE: 52 MMHG | RESPIRATION RATE: 12 BRPM | HEART RATE: 68 BPM | SYSTOLIC BLOOD PRESSURE: 108 MMHG | BODY MASS INDEX: 20.86 KG/M2

## 2020-11-17 DIAGNOSIS — Z74.09 IMPAIRED MOBILITY: Primary | ICD-10-CM

## 2020-11-17 DIAGNOSIS — K92.2 LOWER GI BLEEDING: ICD-10-CM

## 2020-11-17 DIAGNOSIS — K51.90 CHRONIC ULCERATIVE COLITIS WITHOUT COMPLICATION, UNSPECIFIED LOCATION: ICD-10-CM

## 2020-11-17 DIAGNOSIS — K51.011 ULCERATIVE PANCOLITIS WITH RECTAL BLEEDING: ICD-10-CM

## 2020-11-17 DIAGNOSIS — I82.4Z3 ACUTE DEEP VEIN THROMBOSIS (DVT) OF DISTAL VEIN OF BOTH LOWER EXTREMITIES: ICD-10-CM

## 2020-11-17 PROCEDURE — 99999 PR PBB SHADOW E&M-EST. PATIENT-LVL III: ICD-10-PCS | Mod: PBBFAC,,, | Performed by: FAMILY MEDICINE

## 2020-11-17 PROCEDURE — 99213 OFFICE O/P EST LOW 20 MIN: CPT | Mod: PBBFAC | Performed by: FAMILY MEDICINE

## 2020-11-17 PROCEDURE — 99213 OFFICE O/P EST LOW 20 MIN: CPT | Mod: S$PBB | Performed by: FAMILY MEDICINE

## 2020-11-17 PROCEDURE — 99999 PR PBB SHADOW E&M-EST. PATIENT-LVL III: CPT | Mod: PBBFAC,,, | Performed by: FAMILY MEDICINE

## 2020-11-17 PROCEDURE — 99999 PR STA SHADOW: CPT | Mod: PBBFAC,,, | Performed by: FAMILY MEDICINE

## 2020-11-17 NOTE — PROGRESS NOTES
Subjective:       Patient ID: Katarina Rivera is a 86 y.o. female.    Chief Complaint: Follow-up (follow up from Hospital stay in [facility] )    Pt is a 86 y.o. female who presents for check up for [facility] for GI bleeding. Impaired mobility  (primary encounter diagnosis)  Chronic ulcerative colitis without complication, unspecified location  Acute deep vein thrombosis (dvt) of distal vein of both lower extremities  Ulcerative pancolitis with rectal bleeding. Doing well on current meds. Denies any side effects. Prevention is up to date.    Review of Systems   Constitutional: Negative for appetite change, chills and fever.   HENT: Negative for rhinorrhea, sinus pressure, sore throat and trouble swallowing.    Respiratory: Negative for cough, chest tightness, shortness of breath and wheezing.    Cardiovascular: Negative for chest pain and palpitations.   Gastrointestinal: Positive for abdominal pain and rectal pain. Negative for blood in stool, diarrhea, nausea and vomiting.        Controlled bleeding   Genitourinary: Negative for dysuria, flank pain, hematuria, pelvic pain, urgency, vaginal bleeding, vaginal discharge and vaginal pain.   Musculoskeletal: Negative for back pain, joint swelling and neck stiffness.   Skin: Negative for rash.   Neurological: Negative for dizziness, weakness, light-headedness, numbness and headaches.   Hematological: Does not bruise/bleed easily.   Psychiatric/Behavioral: Negative for agitation. The patient is not nervous/anxious.        Objective:      Physical Exam  Constitutional:       Appearance: She is well-developed.      Comments: Milfay W/C   HENT:      Head: Normocephalic.   Eyes:      Pupils: Pupils are equal, round, and reactive to light.   Neck:      Musculoskeletal: Normal range of motion and neck supple.      Thyroid: No thyromegaly.   Cardiovascular:      Rate and Rhythm: Normal rate and regular rhythm.   Pulmonary:      Effort: No respiratory distress.      Breath sounds: No  wheezing or rales.   Chest:      Chest wall: No tenderness.   Abdominal:      General: There is no distension.      Tenderness: There is no abdominal tenderness. There is no guarding or rebound.      Comments: BBS ++   Musculoskeletal: Normal range of motion.         General: No tenderness.   Lymphadenopathy:      Cervical: No cervical adenopathy.   Skin:     General: Skin is warm and dry.      Coloration: Skin is not pale.      Findings: No rash.   Neurological:      Mental Status: She is alert and oriented to person, place, and time.      Cranial Nerves: No cranial nerve deficit.      Motor: No abnormal muscle tone.      Coordination: Coordination normal.      Deep Tendon Reflexes: Reflexes are normal and symmetric. Reflexes normal.      Comments: Tremor and in a W/C   Psychiatric:         Thought Content: Thought content normal.         Judgment: Judgment normal.         Assessment:       1. Impaired mobility    2. Chronic ulcerative colitis without complication, unspecified location    3. Acute deep vein thrombosis (DVT) of distal vein of both lower extremities    4. Ulcerative pancolitis with rectal bleeding        Plan:   Katarina was seen today for follow-up.    Diagnoses and all orders for this visit:    Impaired mobility    Chronic ulcerative colitis without complication, unspecified location    Acute deep vein thrombosis (DVT) of distal vein of both lower extremities    Ulcerative pancolitis with rectal bleeding    P t would be high risk for total colectomy but if necessary can proceed

## 2020-11-18 NOTE — DISCHARGE SUMMARY
[facility] Hospital Medicine  Discharge Summary      Patient Name: Katarina Rivera  MRN: [id number]  Admission Date: 11/7/2020  Hospital Length of Stay: 5 days  Discharge Date and Time: 11/12/2020  4:18 PM  Attending Physician:Christine Baer MD   Discharging Provider: Christine Baer MD  Primary Care Provider: Ethan Denton MD      HPI:   Ms. Rivera is an 87 yo female with a pertinent past medical history of ulcerative colitis (stable for several years), bilateral PE and DVT 1/2019 (on xarelto) who presented [place] ED with bloody diarrhea. The patient states she has been seeing bright red blood in her stool for about a month but it got worse over the last few days. She endorses occasional SOB since the bleeding started. The patient reports she fell on Friday after her legs were weak and gave out. She is noted to have bruising to her forehead and face. No abdominal pain or vomiting. No chills, fevers, body aches. In the ED she was found to have bright red blood in her rectum. Her vitals were stable. Labs revealed Hb 5.7 and Hct 22. She received 2 units of PRBC. Her D Dimer was elevated at 4.55.  Stool studies were sent - rotavirus Ag negative; occult blood positive; stool WBC, O&P, giardia/crypto in process.  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 The patient was transferred and admitted to [facility] hospital medicine service for GI services.       Procedure(s) (LRB):  COLONOSCOPY (N/A)      Hospital Course:   Ms. Rivera presented with hematochezia to [facility]. Transferred to [facility] for GI service. Her hemoglobin was 5.7 on arrival. She was admitted for GI bleed, and was transfused 2U PRBC, started on PPI and Xarelto held. She improved some with holding Xarelto alone. A colonoscopy was performed which showed severe (Daley Score 3) pancolitis, ulcerative colitis s/p biopsy of polypoid lesion in the sigmoid colon,  caecum. Suspected large and growing and oozing inflammatory polyp on 11/9/20.  It was recommended in the past that the patient undergo total abdominal colectomy, and that it would be the first choice of next step, as these lesions will be very difficult to perform surveillance on. She was continued on sulfasalazine for her UC. GI cleared patient to resume anticoagulation with Xarelto. Extensive done with patient and daughter at the bedside on multiple occasion on the risk/benefits of resuming Xarelto, and it was finally determined that she would resume Xarelto for her PE/DVT and be monitored overnight in the hospital to assess for bleeding. Patient had no further bleeding and her H/H was stable with Hgb of 9.5 on day of discharge. Patient to follow up with General Surgery to discuss option of total colectomy for her UC and then follow up with GI, and her PCP. She was seen by therapy services and arranged for home health with PT/OT upon discharge.     Consults:   Consults (From admission, onward)        Status Ordering Provider     Inpatient consult to Gastroenterology  Once     Provider:  (Not yet assigned)    Completed NITHIN BAH     IP consult to case management  Once     Provider:  (Not yet assigned)    Completed NITHIN BAH        Service: Hospital Medicine    Final Active Diagnoses:    Diagnosis Date Noted POA    PRINCIPAL PROBLEM:  GI bleed [K92.2] 11/07/2020 Yes    Ulcerative colitis [K51.90] 07/26/2012 Yes    History of pulmonary embolism [Z86.711] 01/25/2019 Yes    Hypokalemia [E87.6] 11/10/2020 Yes    Weakness [R53.1] 11/08/2020 Yes    History of ulcerative colitis [Z87.19]  Not Applicable    History of DVT (deep vein thrombosis) [Z86.718] 01/28/2019 Not Applicable    Diastolic dysfunction [I51.89] 07/29/2016 Yes    Tremor [R25.1] 07/26/2012 Yes      Problems Resolved During this Admission:       Discharged Condition: good    Disposition: Home-Health Care Svc    Follow  Up:    Patient Instructions:      Diet Cardiac     Activity as tolerated       Significant Diagnostic Studies:     Pending Diagnostic Studies:     None         Medications:  Reconciled Home Medications:      Medication List      START taking these medications    pantoprazole 40 MG tablet  Commonly known as: PROTONIX  Take 1 tablet (40 mg total) by mouth once daily.        CHANGE how you take these medications    metoprolol tartrate 100 MG tablet  Commonly known as: LOPRESSOR  Take 1 tablet (100 mg total) by mouth once daily.  What changed: when to take this     rivaroxaban 20 mg Tab  Commonly known as: XARELTO  Take 1 tablet (20 mg total) by mouth daily with dinner or evening meal.  What changed: Another medication with the same name was removed. Continue taking this medication, and follow the directions you see here.        CONTINUE taking these medications    atorvastatin 20 MG tablet  Commonly known as: LIPITOR  Take 20 mg by mouth once daily.     furosemide 40 MG tablet  Commonly known as: LASIX  TAKE ONE TABLET BY MOUTH EVERY DAY     IRON 100 PLUS Tab  Generic drug: iron-vit c-b12-folic acid  Take 1 tablet by mouth once daily.     latanoprost 0.005 % ophthalmic solution  Place 1 drop into both eyes every evening.     nitroGLYCERIN 0.4 MG SL tablet  Commonly known as: NITROSTAT  Place 1 tablet (0.4 mg total) under the tongue every 5 (five) minutes as needed for Chest pain.     sulfaSALAzine 500 mg Tab  Commonly known as: AZULFIDINE  TAKE ONE TABLET BY MOUTH TWICE A DAY            Indwelling Lines/Drains at time of discharge:   Lines/Drains/Airways     None                 Time spent on the discharge of patient: 45 minutes  Patient was seen and examined on the date of discharge and determined to be suitable for discharge.         Christine Baer MD  Department of Hospital Medicine  [facility]

## 2020-11-20 ENCOUNTER — TELEPHONE (OUTPATIENT)
Dept: FAMILY MEDICINE | Facility: CLINIC | Age: 85
End: 2020-11-20

## 2020-11-20 NOTE — TELEPHONE ENCOUNTER
----- Message from Becky Negrete sent at 2020 10:28 AM CST -----  Contact: tabitha borrego  Katarina Rivera  MRN: [id number]  : 1934  PCP: Ethan Denton  Home Phone      [phone]  Work Phone      Not on file.  Mobile          Not on file.      MESSAGE:  Pt has aid to go and help bathe. Family requesting only once a week as daughter can do the other day.   Phone :[phone]

## 2020-11-22 ENCOUNTER — NURSE TRIAGE (OUTPATIENT)
Dept: ADMINISTRATIVE | Facility: CLINIC | Age: 85
End: 2020-11-22

## 2020-11-22 NOTE — TELEPHONE ENCOUNTER
Contacted pt on behalf of Post Procedural Symptom Tracker. Pt's daughter verified by first and last name and . Pt denies any fever, cough, or difficulty breathing since procedure. Advised pt if these symptoms do arise to contact [facility] or PCP. No follow up needed.    Reason for Disposition   Health Information question, no triage required and triager able to answer question    Additional Information   Negative: [1] Caller is not with the adult (patient) AND [2] reporting urgent symptoms   Negative: Lab result questions   Negative: Medication questions   Negative: Caller can't be reached by phone   Negative: Caller has already spoken to PCP or another triager   Negative: RN needs further essential information from caller in order to complete triage   Negative: Requesting regular office appointment   Negative: [1] Caller requesting NON-URGENT health information AND [2] PCP's office is the best resource    Protocols used: INFORMATION ONLY CALL - NO TRIAGE[facility]

## 2020-11-25 ENCOUNTER — EXTERNAL HOME HEALTH (OUTPATIENT)
Dept: HOME HEALTH SERVICES | Facility: HOSPITAL | Age: 85
End: 2020-11-25
Payer: MEDICARE

## 2020-12-02 ENCOUNTER — DOCUMENT SCAN (OUTPATIENT)
Dept: HOME HEALTH SERVICES | Facility: HOSPITAL | Age: 85
End: 2020-12-02
Payer: MEDICARE

## 2020-12-02 NOTE — PROGRESS NOTES
"Subjective:      Katarina Rivera is a 86 y.o. female with a longstanding history of ulcerative colitis (dx'd 1995), who was last seen in colon and rectal surgery clinic on 02/19/2016 following referral by Dr. Michael San of GI. At that time, she was on Lialda with well controlled disease undergoing surveillance colonoscopies, the most recent of which (on 11/10/15) had shown a sigmoid stricture and a 2cm sessile polyp at the hepatic flexure that was not amenable to EMR (but biopsied and found to be "mixed villous and adenomatous polyp"). Given concerns for this sigmoid stricture and unresectable hepatic flexure polyp in the setting of longstanding UC, she was offered a total abdominal colectomy with end ileostomy, however she opted to hold off on surgery and was lost to follow-up.     Since then, she has had a NSTEMI and has also suffered from bilateral PEs for which she is on Xarelto, and has been on Sulfasalazine with reportedly good control of her disease. She recently presented to [place] ED on 11/7/20 with BRBPR x 1 month and was found to have a Hgb of 5.7. She was transfused 2 units of PRBCs and subsequently transferred to [facility] for further GI evaluation and her Xarelto was held. She underwent a CT A/P what showed colitis from the transverse colon to the rectum. A colonoscopy performed on 11/9/2020 showing pancolitis with detailed results below. Prior to this admission, she had not undergone any colonoscopy since 11/10/2015. It was thought that her bleeding was from the inflammatory sigmoid polyp noted below. She was continued on her home sulfasalazine during her hospitalization. Her Xarelto was eventually restarted and she was discharged home on Xarelto on 11/18.     She now presents for discussion regarding the possibility of total abdominal colectomy.       Today she reports that she is feeling well and remains on xarelto. She denies any BRBPR or melena. She states that prior to this recent flare, she " had not had any flares since 2016. She remains of sulfasalazine, which is managed by her PCP, Dr. Denton at [facility] with no abdominal discomfort. She has no abdominal pain and continues to have 3-4 BMs per day.   At this time she would like to avoid surgery if at all possible. She reports some RUTLEDGE and ambulates with a walker.    She denies any FH of colon or rectal cancer. Denies any FH of IBD.      Colonoscopy 11/9/20 (Lyle Wilburn)  1. The perianal and digital rectal examinations were normal.   2. Inflammation was found in a continuous and circumferential pattern from the rectum to the cecum. This was graded as Daley Score 3 (severe, with spontaneous bleeding, ulcerations).   3. A 30 mm polypoid lesion was found in the sigmoid colon. The lesion was fungating. Oozing was present. Biopsies were taken with a cold forceps for histology. Path - Colonic mucosa with crypt architectural distortion and multifocal acute cryptitis, consistent with chronic mildly active colitis. Negative for granulomas and dysplasia.    4. A localized area of moderately nodular mucosa was found in the transverse colon located just distal to the tattoo site and just distal to the large semi-peduculated polyp Biopsies were taken with a cold forceps for histology. Path - Colonic mucosa with crypt architectural distortion, multifocal acute cryptitis with crypt abscesses and focal erosion, consistent with chronic moderately active colitis  5. A 20 mm polyp was found in the transverse colon. The polyp was semi-pedunculated. Polypectomy was not attempted due to the presence of severe  colitis.   6. A 15 mm polypoid lesion was found in the cecum. The lesion was sessile. No bleeding was present. Biopsies were taken with a cold forceps for histology. Path - tubular adenoma  7. A single (solitary) 10 mm ulcer was found in the distal rectum. No bleeding was present. No stigmata of recent bleeding were seen. Biopsies were taken with a cold forceps for  histology. This is fairly deep ulcerated lesion, unclear if this is fistulous or just an excavated / deep ulcer. Path - Squamous mucosa, acutely inflamed. Fragment of granulation tissue, consistent with ulcer bed. CMV immunostain is negative.         Patient Active Problem List    Diagnosis Date Noted    Chronic ulcerative colitis without complication 11/17/2020    Impaired mobility 11/17/2020    Acute deep vein thrombosis (DVT) of distal vein of both lower extremities 11/17/2020    Ulcerative pancolitis with rectal bleeding 11/17/2020    Hypokalemia 11/10/2020    Weakness 11/08/2020    GI bleed 11/07/2020    Sore throat 02/13/2020    Cough 02/13/2020    History of ulcerative colitis     History of DVT (deep vein thrombosis) 01/28/2019    Chest pain 01/25/2019    History of pulmonary embolism 01/25/2019    Elevated LFTs 09/08/2016    Diastolic dysfunction 07/29/2016    Pneumonia 07/26/2016    SOB (shortness of breath) 07/25/2016    Atrial flutter 07/25/2016    Leukocytosis 07/25/2016    NSTEMI (non-ST elevated myocardial infarction) 07/23/2016    Dental abscess 04/01/2016    Ulcerative colitis, chronic 11/10/2015    Cataract NEC 07/12/2013    Pre-operative clearance 07/12/2013    ALLERGIC RHINITIS 08/09/2012    Glaucoma (increased eye pressure) 07/26/2012    Ulcerative colitis 07/26/2012    Osteoarthritis 07/26/2012    Tremor 07/26/2012     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       Past Surgical History:   Procedure Laterality Date    APPENDECTOMY      COLONOSCOPY      COLONOSCOPY N/A 11/10/2015    Procedure: COLONOSCOPY;  Surgeon: Michael San MD;  Location: [facility] ENDO ([address] [place] [address]);  Service: Endoscopy;  Laterality: N/A;    COLONOSCOPY N/A 11/9/2020    Procedure: COLONOSCOPY;  Surgeon: Lyle Wilburn MD;  Location: [facility] ENDO;  Service: Endoscopy;   Laterality: N/A;    hemorrhoidectomy      HYSTERECTOMY      OOPHORECTOMY      TOTAL KNEE ARTHROPLASTY      left         Review of patient's allergies indicates:  No Known Allergies   Social History     Tobacco Use    Smoking status: Never Smoker    Smokeless tobacco: Never Used   Substance Use Topics    Alcohol use: No      Family History   Problem Relation Age of Onset    Stroke Mother     Blindness Father     Colon cancer Neg Hx     Ulcerative colitis Neg Hx         Review of Systems  Constitutional: negative  Eyes: negative  Ears, nose, mouth, throat, and face: negative, some dysphonia with chronic vocal cord paresis  Respiratory: positive for dyspnea on exertion  Cardiovascular: negative  Gastrointestinal: negative  Genitourinary:negative  Integument/breast: negative  Hematologic/lymphatic: negative  Musculoskeletal:negative  Neurological: negative  Behavioral/Psych: negative  Endocrine: negative  Allergic/Immunologic: negative        Objective:      There were no vitals taken for this visit.   Physical Exam  Vitals signs reviewed.   Constitutional:       Appearance: Normal appearance.   HENT:      Head: Normocephalic and atraumatic.      Mouth/Throat:      Mouth: Mucous membranes are moist.   Eyes:      General: No scleral icterus.  Neck:      Musculoskeletal: Normal range of motion.   Cardiovascular:      Rate and Rhythm: Normal rate and regular rhythm.   Pulmonary:      Effort: Pulmonary effort is normal.   Abdominal:      Comments: Soft, NT. ND   Musculoskeletal:      Right lower leg: No edema.      Left lower leg: No edema.   Skin:     General: Skin is warm.      Capillary Refill: Capillary refill takes less than 2 seconds.   Neurological:      General: No focal deficit present.      Mental Status: She is alert and oriented to person, place, and time.         Imaging  CT ABDOMEN PELVIS WITH CONTRAST 11/7/20     CLINICAL HISTORY:  Abdominal distension;Nausea/vomiting;     TECHNIQUE:  Low dose  axial images, sagittal and coronal reformations were obtained from the lung bases to the pubic symphysis following the IV administration of 75 mL of Omnipaque 350 .  Oral contrast was not given.     COMPARISON:  01/25/2019     FINDINGS:  Lung bases are clear.  Normal sized heart.  Calcification aortic valve.  Nondistended gallbladder.     There is thinning of the renal cortices especially on the left.  Simple cyst lower pole right kidney.  Pancreas atrophy.  Wedge-shaped peripheral hypodense lesion splenic midbody.  Nodular hyperdense lesion inferior right hepatic lobe, segment 6, 1.1 cm in size.  1.6 cm left adrenal nodule Hounsfield units 64.  Remaining solid abdominal organs are unremarkable.     There is no enteric contrast which limits bowel assessment.  No dilated bowel loops.  Circumferential wall thickening with mucosal enhancement rectum, extending proximally through the colon to the transverse segment.     Atherosclerosis.  Small fat containing umbilical defect.  Borderline enlarged lymph nodes in the right lower abdominal quadrant mesentery with reference measuring 1 cm series 2, image 70.  Bilateral small fat containing inguinal hernias.  Hysterectomy.  Urinary bladder unremarkable.     Slight levocurvature of the lumbar spine.  Mild multilevel degenerative disc disease of the upper 2 lumbar levels and included lower thoracic levels.     Impression:     1. Long segment proctocolitis which could reflect inflammatory disease (UC) or infectious etiology.  2. Indeterminate but unchanged left adrenal nodule.  Suggest elective adrenal mass protocol CT if further characterization is desired.  3. Geographic hypodense lesion in the spleen favored secondary to phase of contrast.  Infarct is an additional consideration.  4. Enhancing lesion right hepatic lobe suspicious for a portosystemic collateral.    Lab Review  Lab Results   Component Value Date    WBC 8.11 11/12/2020    HCT 33.1 (L) 11/12/2020    HGB 9.5 (L)  11/12/2020     11/12/2020      Lab Results   Component Value Date    AST 13 11/11/2020    ALT 5 (L) 11/11/2020    ALKPHOS 79 11/11/2020    PROT 7.2 11/11/2020    AMYLASE 85 08/22/2016      No results found for: CEA      Assessment:     86 year old woman with pancolonic UC on Xarelto for PE, recently admitted with GIB. She remains on Sulfasalazine.   We had an extensive conversation regarding the possibility to performing a total abdominal colectomy given the pan-colonic extent of her disease and the diffuse nature of her polyps. However she has a clear understanding that given her age and comorbidities, she is at high risk for surgery. We performed an ACS NSQIP risk calculator, finding a mortality risk of 30% (see below) with surgery. She understands that without resection, some of these polyps could become malignant and that despite these polyps being benign on pathology and appearing to be inflammatory in nature, there is still a possibility that they may be harboring malignancy due to sampling error. At this time, she does not want any surgical intervention.   We next discussed options to reduce the risk of recurrent bleeding as these polyps are likely to continue to grow and recurrent flares will likely cause recurrent bleeding, particularly while on Xarelto. She would like to pursue medical optimization in an attempt to better control her disease and reduce the risk of re-bleeding. We will therefore have her follow up in IBD clinic here at [facility]. We also discussed the need for her to be seen by cardiology to discuss whether or not she still needs to be on anticoagulation, as she states she has not been seen by cardiology in years.     Plan:      No surgical intervention at this time, as the patient does not want surgery, which she understands would be extremely high risk.  Continue Sulfasalazine  Referral to IBD Clinic  Referral to Cardiology    Dennis Mariscal MD  Colon and Rectal Surgery  Fellow  [facility]

## 2020-12-03 ENCOUNTER — OFFICE VISIT (OUTPATIENT)
Dept: SURGERY | Facility: CLINIC | Age: 85
End: 2020-12-03
Payer: MEDICARE

## 2020-12-03 VITALS — HEIGHT: 63 IN | WEIGHT: 120 LBS | BODY MASS INDEX: 21.26 KG/M2

## 2020-12-03 DIAGNOSIS — K51.011 ULCERATIVE PANCOLITIS WITH RECTAL BLEEDING: Primary | ICD-10-CM

## 2020-12-03 DIAGNOSIS — R58 BLEEDING: Primary | ICD-10-CM

## 2020-12-03 DIAGNOSIS — K51.919 ULCERATIVE COLITIS WITH COMPLICATION, UNSPECIFIED LOCATION: ICD-10-CM

## 2020-12-03 PROCEDURE — 99999 PR PBB SHADOW E&M-EST. PATIENT-LVL III: CPT | Mod: PBBFAC,,, | Performed by: COLON & RECTAL SURGERY

## 2020-12-03 PROCEDURE — 99205 OFFICE O/P NEW HI 60 MIN: CPT | Mod: S$PBB,,, | Performed by: COLON & RECTAL SURGERY

## 2020-12-03 PROCEDURE — 99213 OFFICE O/P EST LOW 20 MIN: CPT | Mod: PBBFAC | Performed by: COLON & RECTAL SURGERY

## 2020-12-03 PROCEDURE — 99205 PR OFFICE/OUTPT VISIT, NEW, LEVL V, 60-74 MIN: ICD-10-PCS | Mod: S$PBB,,, | Performed by: COLON & RECTAL SURGERY

## 2020-12-03 PROCEDURE — 99999 PR PBB SHADOW E&M-EST. PATIENT-LVL III: ICD-10-PCS | Mod: PBBFAC,,, | Performed by: COLON & RECTAL SURGERY

## 2020-12-03 NOTE — LETTER
December 3, 2020      Lyle Wilburn MD  [address]  [address]  [facility] [address]           [facility]  [address]  [place] [address]  Phone: [phone]  Fax: [phone]          Patient: Katarina Rivera   MR Number: [id number]   YOB: 1934   Date of Visit: 12/3/2020       Dear Dr. Lyle Wilburn:    Thank you for referring Katarina Rivera to me for evaluation. Attached you will find relevant portions of my assessment and plan of care.    If you have questions, please do not hesitate to call me. I look forward to following Katarina Rivera along with you.    Sincerely,    Augusto Kapadia MD    Enclosure  CC:  No Recipients    If you would like to receive this communication electronically, please contact [email or web address] or [phone] to request more information on [email or web address] Link access.    For providers and/or their staff who would like to refer a patient to [facility], please contact us through our one-stop-shop provider referral line, [facility], at [phone].    If you feel you have received this communication in error or would no longer like to receive these types of communications, please e-mail [email or web address]

## 2020-12-07 ENCOUNTER — TELEPHONE (OUTPATIENT)
Dept: SURGERY | Facility: CLINIC | Age: 85
End: 2020-12-07

## 2020-12-07 NOTE — TELEPHONE ENCOUNTER
----- Message from Yadi Quigley sent at 12/7/2020 11:03 AM CST -----  Contact: Sandra (daughter)  Patient is returning a phone call    Sandra at [phone]

## 2020-12-07 NOTE — TELEPHONE ENCOUNTER
Left message for Constance, patient's daughter in regards to appointments with GI and Cardiology.  Informed her that a message was forwarded to them to reach out to patient and schedule an appointment.

## 2020-12-07 NOTE — TELEPHONE ENCOUNTER
----- Message from Ranjith Leon sent at 12/7/2020  8:14 AM CST -----  Contact: Daughter  Constance  Pt daughter calling to speak with nurse concerning an appt that  said he would make for this pt     Please Call     Contact  [phone]

## 2020-12-07 NOTE — TELEPHONE ENCOUNTER
Spoke with patient's daughter, Sandra and informed her that the message on her voicemail just said that she could call GI and Cardiology to try and get an appointment for her mother.  I also sent a message to each department to be scheduled with them.

## 2020-12-08 ENCOUNTER — OFFICE VISIT (OUTPATIENT)
Dept: GASTROENTEROLOGY | Facility: CLINIC | Age: 85
End: 2020-12-08
Payer: MEDICARE

## 2020-12-08 ENCOUNTER — TELEPHONE (OUTPATIENT)
Dept: GASTROENTEROLOGY | Facility: CLINIC | Age: 85
End: 2020-12-08

## 2020-12-08 ENCOUNTER — LAB VISIT (OUTPATIENT)
Dept: LAB | Facility: HOSPITAL | Age: 85
End: 2020-12-08
Attending: INTERNAL MEDICINE
Payer: MEDICARE

## 2020-12-08 VITALS
HEART RATE: 77 BPM | SYSTOLIC BLOOD PRESSURE: 131 MMHG | HEIGHT: 63 IN | WEIGHT: 113.56 LBS | DIASTOLIC BLOOD PRESSURE: 61 MMHG | BODY MASS INDEX: 20.12 KG/M2

## 2020-12-08 DIAGNOSIS — D12.6 DYSPLASIA OF COLON: ICD-10-CM

## 2020-12-08 DIAGNOSIS — K51.90 CHRONIC ULCERATIVE COLITIS WITHOUT COMPLICATION, UNSPECIFIED LOCATION: Primary | ICD-10-CM

## 2020-12-08 DIAGNOSIS — K51.90 CHRONIC ULCERATIVE COLITIS WITHOUT COMPLICATION, UNSPECIFIED LOCATION: ICD-10-CM

## 2020-12-08 LAB
ALBUMIN SERPL BCP-MCNC: 3.1 G/DL (ref 3.5–5.2)
ALP SERPL-CCNC: 80 U/L (ref 55–135)
ALT SERPL W/O P-5'-P-CCNC: <5 U/L (ref 10–44)
ANION GAP SERPL CALC-SCNC: 11 MMOL/L (ref 8–16)
AST SERPL-CCNC: 13 U/L (ref 10–40)
BASOPHILS # BLD AUTO: 0.1 K/UL (ref 0–0.2)
BASOPHILS NFR BLD: 1 % (ref 0–1.9)
BILIRUB SERPL-MCNC: 0.4 MG/DL (ref 0.1–1)
BUN SERPL-MCNC: 16 MG/DL (ref 8–23)
CALCIUM SERPL-MCNC: 8.5 MG/DL (ref 8.7–10.5)
CHLORIDE SERPL-SCNC: 106 MMOL/L (ref 95–110)
CO2 SERPL-SCNC: 24 MMOL/L (ref 23–29)
CREAT SERPL-MCNC: 0.9 MG/DL (ref 0.5–1.4)
CRP SERPL-MCNC: 43.4 MG/L (ref 0–8.2)
DIFFERENTIAL METHOD: ABNORMAL
EOSINOPHIL # BLD AUTO: 0.2 K/UL (ref 0–0.5)
EOSINOPHIL NFR BLD: 2.4 % (ref 0–8)
ERYTHROCYTE [DISTWIDTH] IN BLOOD BY AUTOMATED COUNT: ABNORMAL % (ref 11.5–14.5)
EST. GFR  (AFRICAN AMERICAN): >60 ML/MIN/1.73 M^2
EST. GFR  (NON AFRICAN AMERICAN): 58 ML/MIN/1.73 M^2
FERRITIN SERPL-MCNC: 13 NG/ML (ref 20–300)
GLUCOSE SERPL-MCNC: 142 MG/DL (ref 70–110)
HCT VFR BLD AUTO: 34.9 % (ref 37–48.5)
HGB BLD-MCNC: 10.1 G/DL (ref 12–16)
IMM GRANULOCYTES # BLD AUTO: 0.06 K/UL (ref 0–0.04)
IMM GRANULOCYTES NFR BLD AUTO: 0.6 % (ref 0–0.5)
LYMPHOCYTES # BLD AUTO: 1.8 K/UL (ref 1–4.8)
LYMPHOCYTES NFR BLD: 17.7 % (ref 18–48)
MCH RBC QN AUTO: 21.6 PG (ref 27–31)
MCHC RBC AUTO-ENTMCNC: 28.9 G/DL (ref 32–36)
MCV RBC AUTO: 75 FL (ref 82–98)
MONOCYTES # BLD AUTO: 0.7 K/UL (ref 0.3–1)
MONOCYTES NFR BLD: 7.2 % (ref 4–15)
NEUTROPHILS # BLD AUTO: 7.2 K/UL (ref 1.8–7.7)
NEUTROPHILS NFR BLD: 71.1 % (ref 38–73)
NRBC BLD-RTO: 0 /100 WBC
PLATELET # BLD AUTO: 275 K/UL (ref 150–350)
PMV BLD AUTO: ABNORMAL FL (ref 9.2–12.9)
POTASSIUM SERPL-SCNC: 2.8 MMOL/L (ref 3.5–5.1)
PROT SERPL-MCNC: 7.2 G/DL (ref 6–8.4)
RBC # BLD AUTO: 4.67 M/UL (ref 4–5.4)
SODIUM SERPL-SCNC: 141 MMOL/L (ref 136–145)
WBC # BLD AUTO: 10.07 K/UL (ref 3.9–12.7)

## 2020-12-08 PROCEDURE — 99213 OFFICE O/P EST LOW 20 MIN: CPT | Mod: PBBFAC,PO | Performed by: INTERNAL MEDICINE

## 2020-12-08 PROCEDURE — 99215 OFFICE O/P EST HI 40 MIN: CPT | Mod: S$PBB,,, | Performed by: INTERNAL MEDICINE

## 2020-12-08 PROCEDURE — 82728 ASSAY OF FERRITIN: CPT

## 2020-12-08 PROCEDURE — 99215 PR OFFICE/OUTPT VISIT, EST, LEVL V, 40-54 MIN: ICD-10-PCS | Mod: S$PBB,,, | Performed by: INTERNAL MEDICINE

## 2020-12-08 PROCEDURE — 86140 C-REACTIVE PROTEIN: CPT

## 2020-12-08 PROCEDURE — 80053 COMPREHEN METABOLIC PANEL: CPT

## 2020-12-08 PROCEDURE — 36415 COLL VENOUS BLD VENIPUNCTURE: CPT

## 2020-12-08 PROCEDURE — 85025 COMPLETE CBC W/AUTO DIFF WBC: CPT

## 2020-12-08 PROCEDURE — 82306 VITAMIN D 25 HYDROXY: CPT

## 2020-12-08 PROCEDURE — 99999 PR PBB SHADOW E&M-EST. PATIENT-LVL III: ICD-10-PCS | Mod: PBBFAC,,, | Performed by: INTERNAL MEDICINE

## 2020-12-08 PROCEDURE — 99999 PR PBB SHADOW E&M-EST. PATIENT-LVL III: CPT | Mod: PBBFAC,,, | Performed by: INTERNAL MEDICINE

## 2020-12-08 NOTE — PATIENT INSTRUCTIONS
Vedolizumab (Entyvio): Biologics - Anti- integrin  - Mechanism of action:  Entyvio blocks alpha-4-beta-7 which plays a role in the inflammatory process for inflammatory bowel disease    Entyvio Dosage:  - Loading Dose: 300 mg IV on week 0, 2, and 6  - Maintenance Dose: 300 mg IV every 8 weeks  - 30 minute infusion     Common Adverse Reactions of Entyvio  Adverse reactions in >3% of Entyvio-treated patients and >1% higher than Placebo   Entyvio (P=8103)   Placebo (N=297)   Nasopharyngitis   (runny nose/sore throat) 13% - 13/100 7% - 7/100   Headache 12% - 12/100 11% - 11/100   Joint pain 12% - 12/100 10% - 10/100   Nausea 9% - 9/100 8% - 8/100   Fever 9% - 9/100 7% - 7/100   Upper Respiratory Tract Infection 7% - 7/100 6% - 6/100   Fatigue 6% - 6/100 3% - 3/100   Cough 5% - 5/100 3% - 3/100   Bronchitis 4% - 4/100 3% - 3/100   Influenza 4% - 4/100 2% - 2/100   Back Pain 4% - 4/100 3% - 3/100   Rash 3% - 3/100 2% - 2/100   Itching 3% - 3/100 1% - 1/100   Sinusitis 3% - 3/100 1% - 1/100   Oropharyngeal Pain 3% - 3/100 1% - 1/100   Pain in Extremities 3% - 3/100 1% - 1/100     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

## 2020-12-08 NOTE — TELEPHONE ENCOUNTER
I attemped to contact patient and daughter.  Communicated with dr. Bennett.  Patient has appt with dr. faulkner in the IBD clinic this Friday.    Please inform them that I have communicated with the IBD clinic and we will coordinate care after their visit.

## 2020-12-08 NOTE — PROGRESS NOTES
GASTROENTEROLOGY CLINIC NOTE    Reason for visit: The primary encounter diagnosis was Chronic ulcerative colitis without complication, unspecified location. A diagnosis of Dysplasia of colon was also pertinent to this visit.  Referring provider/PCP: Ethan Denton MD    HPI:  Katarina Rivera is a 86 y.o. female here today for follow up chronic UC, post hospital  Previously seen by Dr. San and Dr. Kapadia.    Recently seen by Dr. Kapadia in clinic post discharge.  He was agreed upon that she is too high of a surgical risk for total colectomy.  She had previously been off for this in 2015 as a measure to reduce the colon cancer risk given that she has a difficult colon to surveillance because of multiple inflammatory polyps.      She is currently doing fairly well with her GI symptoms.  She is having fairly regular bowel movements without blood.  There is no significant diarrhea.  There is no vomiting.  She is accompanied by her daughter who assists with the history.  She is taking sulfasalazine  500 tab once a day.  She has never been on any other medications to her knowledge.    Interval history:  - current IBD meds: sulfasalazine 500 once a day  - other GI meds: protonix  - BM/ day: once a day, no blood. No pain.  - constitutional/GI symptoms: no fevers/chills, dysphagia  - extraintestinal manifestations: no eye pain/redness, skin lesions/rashes  - narcotics / tobacco: none      Prior Endoscopy:  EGD: none    Colon:  11/9/20 with me inpatient  Impression:           - Severe (Daley Score 3) pancolitis ulcerative                         colitis.                         - Polypoid lesion in the sigmoid colon. Biopsied.                         This is suspected large and growing and oozing                         inflammatory polyp.                         - Nodular mucosa in the transverse colon. Biopsied.                         This is concerning for flat and lateral spreading                          adenomatous tissue without clear defined borders.                         - One 20 mm polyp in the transverse colon.                         Resection not attempted. Can be resected in future                         if surgery is not pursued.                         - Polypoid lesion in the cecum. Biopsied. Appears                         as flat adenoumatous focal changes                         - A single (solitary) ulcer in the distal rectum.                         Biopsied. Concern for deep excavated ulcer above                         dentate vs. fistulous formation.   Recommendation:       - [facility].                         - Resume previous diet.                         - Continue present medications.                         - Will discuss with patient and family their wishes                         and goals of care... It was recommended in the past                         to undergo total abdominal colectomy, and I believe                         that would be the first choice of next step, as                         these lesions will be very difficult to perform                         surveillance on.                         - If patient adamantly declines surgery, can                         consider biologic therapy to control disease with                         the understanding that cancer surveillance will not                         be adequate given the concerning findings on todays                         exam.                         - Repeat colonoscopy (date not yet determined) for                         surveillance.    PATH:  1. Cecum, polypoid mucosa, biopsy:   Tubular adenoma.   See Note 1.   2. Transverse colon, irregular mucosa, biopsy:   Colonic mucosa with crypt architectural distortion, multifocal acute   cryptitis with crypt abscesses and focal erosion, consistent with chronic   moderately active colitis.   Focal nuclear atypia is seen,  favor reactive atypia, see Note 2.   Negative for granulomas.   3. Polyp at 25 cm, biopsy:   Colonic mucosa with crypt architectural distortion and multifocal acute   cryptitis, consistent with chronic mildly active colitis.   Negative for granulomas and dysplasia.   4. Rectum, deep ulcer, biopsy:   Squamous mucosa, acutely inflamed.   Fragment of granulation tissue, consistent with ulcer bed.   CMV immunostain is negative.   Note 1: The biopsy from the cecum shows colonic mucosa with low grade   dysplasia. The endoscopic impression of polypoid cecal mucosa is noted. No   significant active inflammation is seen in this biopsy. Clinical and   endoscopic correlation is required to determine whether this lesion   represents a sporadic tubular adenoma vs. low grade nodular/flat epithelial   dysplasia arising in the setting of the patient's Ulcerative Colitis.   Note 2: The clinical and endoscopic concern for flat epithelial dysplasia in   the transverse colon is noted. The biopsy of the transverse colon shows a   chronic moderately active colitis. Focally, nuclear hyperchromasia is seen,   however this is in areas with acute inflammation. Nuclear   pseudostratification is also seen, but again predominately in areas which are   acutely inflamed, and these changes do not reach the surface of the   epithelium. Additional levels and p53 immunostain do not show definitive   dysplastic changes. Given that most of the changes are seen in areas of acute   inflammation, the changes are favored to represent reactive changes.   Re-biopsy may be warranted, if clinically indicated.     (Portions of this note were dictated using voice recognition software and may contain dictation related errors in spelling/grammar/syntax not found on text review)    Review of Systems   Constitutional: Positive for malaise/fatigue. Negative for fever.   Respiratory: Negative for cough and shortness of breath.    Cardiovascular: Negative for chest  pain and palpitations.   Gastrointestinal: Negative for abdominal pain, blood in stool, nausea and vomiting.   Neurological: Negative for dizziness and headaches.       Past Medical History: has a past medical history of Glaucoma (increased eye pressure), H/O blood clots, Tremor, essential, and Ulcerative colitis confined to rectum.    Past Surgical History: has a past surgical history that includes Appendectomy; Hysterectomy; hemorrhoidectomy; Colonoscopy; Total knee arthroplasty; Colonoscopy (N/A, 11/10/2015); Oophorectomy; and Colonoscopy (N/A, 11/9/2020).    Family History:family history includes Blindness in her father; Stroke in her mother.    Allergies: Review of patient's allergies indicates:  No Known Allergies    Social History: reports that she has never smoked. She has never used smokeless tobacco. She reports that she does not drink alcohol or use drugs.    Home medications:   Current Outpatient Medications on File Prior to Visit   Medication Sig Dispense Refill    metoprolol tartrate (LOPRESSOR) 100 MG tablet Take 1 tablet (100 mg total) by mouth once daily.      pantoprazole (PROTONIX) 40 MG tablet Take 1 tablet (40 mg total) by mouth once daily. 30 tablet 1    rivaroxaban (XARELTO) 20 mg Tab Take 1 tablet (20 mg total) by mouth daily with dinner or evening meal. 30 tablet 11    sulfaSALAzine (AZULFIDINE) 500 mg Tab TAKE ONE TABLET BY MOUTH TWICE A DAY 60 tablet 5    atorvastatin (LIPITOR) 20 MG tablet Take 20 mg by mouth once daily.      furosemide (LASIX) 40 MG tablet TAKE ONE TABLET BY MOUTH EVERY DAY (Patient not taking: Reported on 2/13/2020) 30 tablet 11    iron-vit c-vit b12-folic acid (IRON-C PLUS) tablet Take 1 tablet by mouth once daily. (Patient not taking: Reported on 2/13/2020) 30 tablet 11    latanoprost 0.005 % ophthalmic solution Place 1 drop into both eyes every evening.       nitroGLYCERIN (NITROSTAT) 0.4 MG SL tablet Place 1 tablet (0.4 mg total) under the tongue every 5  "(five) minutes as needed for Chest pain. 25 tablet 11     No current facility-administered medications on file prior to visit.        Vital signs:  /61 (BP Location: Right arm, Patient Position: Sitting)   Pulse 77   Ht 5' 3" (1.6 m)   Wt 51.5 kg (113 lb 8.6 oz)   BMI 20.11 kg/m²     Physical Exam  Vitals signs reviewed.   Constitutional:       General: She is not in acute distress.  HENT:      Head: Normocephalic and atraumatic.   Eyes:      General: No scleral icterus.     Conjunctiva/sclera: Conjunctivae normal.   Skin:     General: Skin is warm.      Coloration: Skin is not pale.      Findings: No rash.   Neurological:      Mental Status: She is oriented to person, place, and time.      Gait: Gait normal.   Psychiatric:         Mood and Affect: Mood normal.         Behavior: Behavior normal.         Routine labs:  Lab Results   Component Value Date    WBC 10.07 12/08/2020    HGB 10.1 (L) 12/08/2020    HCT 34.9 (L) 12/08/2020    MCV 75 (L) 12/08/2020     12/08/2020     Lab Results   Component Value Date    INR 1.1 11/11/2020     Lab Results   Component Value Date    IRON 14 (L) 01/26/2019    FERRITIN 13 (L) 12/08/2020    TIBC 429 01/26/2019    FESATURATED 3 (L) 01/26/2019     Lab Results   Component Value Date     12/08/2020    K 2.8 (L) 12/08/2020     12/08/2020    CO2 24 12/08/2020    BUN 16 12/08/2020    CREATININE 0.9 12/08/2020     Lab Results   Component Value Date    ALBUMIN 3.1 (L) 12/08/2020    ALT <5 (L) 12/08/2020    AST 13 12/08/2020    ALKPHOS 80 12/08/2020    BILITOT 0.4 12/08/2020     No results found for: GLUCOSE    I have reviewed prior labs, imaging, notes from last month      Assessment:  1. Chronic ulcerative colitis without complication, unspecified location    2. Dysplasia of colon        I have had a long discussion with the patient and their daughter.  Dr. Kapadia had discussed with them a referral to the IBD Clinic for another opinion regarding her " colitis.  She is not a candidate for surgery anymore and she is too high risk.    The patient and the family desire a visit with the IBD Clinic.  I will reach out to our IBD Clinic in an effort to coordinate care.    I do think it is reasonable to offer Entyvio with an effort to put her colitis into remission.  This will then allow us to better surveillance her colon.   I again did explain to her that we cannot guarantee that we can adequately surveillance her colon for colon cancer.  This was also explained to her by Dr. Kapadia.  She has an understanding of this.    Plan:  Orders Placed This Encounter    CBC Auto Differential    Comprehensive Metabolic Panel    Ferritin    C-Reactive Protein    Vitamin D       Will discuss with IBD specialists at [facility].  Consider Entyvio and then re-surveillance in about 6 months    Ok to continue sulfasalazine for now, would increase to 500mg BID until decision about further medical therapy is made.    Labs today   - prior recent labs with negative TB / Hep B serologies.      RTC based on above.      Lyle Wilburn MD  [facility] [place]

## 2020-12-09 DIAGNOSIS — K51.90 CHRONIC ULCERATIVE COLITIS WITHOUT COMPLICATION, UNSPECIFIED LOCATION: Primary | ICD-10-CM

## 2020-12-09 LAB — 25(OH)D3+25(OH)D2 SERPL-MCNC: 12 NG/ML (ref 30–96)

## 2020-12-09 RX ORDER — POTASSIUM CHLORIDE 20 MEQ/1
20 TABLET, EXTENDED RELEASE ORAL 2 TIMES DAILY
Qty: 8 TABLET | Refills: 0 | Status: SHIPPED | OUTPATIENT
Start: 2020-12-09 | End: 2020-12-13

## 2020-12-09 RX ORDER — ERGOCALCIFEROL 1.25 MG/1
50000 CAPSULE ORAL
Qty: 4 CAPSULE | Refills: 1 | Status: SHIPPED | OUTPATIENT
Start: 2020-12-09 | End: 2020-12-31

## 2020-12-09 NOTE — TELEPHONE ENCOUNTER
Informed patients daughter of results. She said that her mother fell out of bed last night and she thinks that patient has fractured a rib. Advised daughter to bring patient to the ED, Urgent Care, or PCP. If she can not travel there to reach out to her PCP and get some advice on what she should do.

## 2020-12-10 ENCOUNTER — TELEPHONE (OUTPATIENT)
Dept: SURGERY | Facility: CLINIC | Age: 85
End: 2020-12-10

## 2020-12-10 NOTE — TELEPHONE ENCOUNTER
Left message for daughter of patient, Sandra in regards to an appointment with a GI provider at [facility].  Informed her that Dr. Wilburn will discuss a plan of care with a GI provider at [facility].

## 2020-12-10 NOTE — TELEPHONE ENCOUNTER
----- Message from Brayan Orellana sent at 12/10/2020  7:46 AM CST -----  Contact: Pt daughter Constance  The pt daughter, Constance, called and would like to have someone call her back    This is regarding an appt for the pt with another doctor    Constance can be reached at [phone]

## 2020-12-14 ENCOUNTER — TELEPHONE (OUTPATIENT)
Dept: FAMILY MEDICINE | Facility: CLINIC | Age: 85
End: 2020-12-14

## 2020-12-14 NOTE — TELEPHONE ENCOUNTER
----- Message from Becky Negrete sent at 2020  2:49 PM CST -----  Contact: [facility]  Katarina Rivera  MRN: [id number]  : 1934  PCP: Ethan Denton  Home Phone      [phone]  Work Phone      Not on file.  Mobile          Not on file.      MESSAGE:    Needs order to stop home health aid.    Phone: [phone]

## 2020-12-21 ENCOUNTER — TELEPHONE (OUTPATIENT)
Dept: GASTROENTEROLOGY | Facility: CLINIC | Age: 85
End: 2020-12-21

## 2020-12-21 NOTE — TELEPHONE ENCOUNTER
----- Message from Lyle Wilburn MD sent at 12/20/2020  9:43 PM CST -----  Regarding: FW: patient  Can we reach out to patient  / daughter and try to figure out why they declined IBD clinic [facility]... this is what I explained to them and what they said they wanted to do...  ----- Message -----  From: Michelle Rowley RN  Sent: 12/18/2020   2:02 PM CST  To: Gregory Zazueta MD, Augusto Kapadia MD, #  Subject: RE: patient                                      Good afternoon,    Mrs. Rivera was scheduled to see Dr. Zazueta 12/11/2020 though she cancelled the appointment. I called to get her rescheduled. Her daughter states that at this time they do not wish to proceed with an appointment with the IBD clinic.    MARY Martinez  ----- Message -----  From: Radha Bennett MD  Sent: 12/9/2020   9:04 AM CST  To: Gregory Zazueta MD, Lyle Wilburn MD, #  Subject: RE: patient                                      Thanks Lyle. We appreciate it and I do think your plan of care was perfect.      SS  ----- Message -----  From: Lyle Wilburn MD  Sent: 12/8/2020   4:47 PM CST  To: Gregory Zazueta MD, Radha Bennett MD, #  Subject: RE: patient                                      Thanks fine... it sounded like the family was interested based on their conversations with Dr. Kapadia... it seems he started the ball and he informed me of such, which is understandable.    I am happy to coordinate care or help in any way. Also happy to see her back at any point.  Thanks.  Lyle  ----- Message -----  From: Radha Bennett MD  Sent: 12/8/2020   4:41 PM CST  To: Gregory Zazueta MD, Lyle Wilburn MD, #  Subject: RE: patient                                      Lyle  I think Dr. Kapadia had already referred the patient to us and Roosevelt Zazueta will be seeing pt this Friday.  My team is already working on that. I hope that's okay with you since we are already started the process.      Thanks so much    Shamita   -----  Message -----  From: Lyle Wilburn MD  Sent: 12/8/2020   9:54 AM CST  To: Radha Bennett MD  Subject: patient                                          Hello,  This is patient I sent you text about...  She has longstanding UC, previously followed by Dr. San.  She was recommended to have colectomy for multiple inflammatory polyps and nodularity back in 2015, as it would be difficult to surveillance her colon.  Dr. Kapadia recommended colectomy at that time as well... but patient refused.    She was admitted to [place] last month with bleeding and anemia. She is on xarelto as well for DVT.  I did colonoscopy, and indeed has some nodular spots, large inflammatory type polyp, and a couple other adenomatous polyps that are flat that I didn't remove (wasn't sure her ultimate trajectory but I can probably remove them in the future).  She again saw Kang at my request, as I wanted to be sure she wasn't a candidate for colectomy, and Kang said she is too high risk now.    I was considering treating her but with Dr. Kapadia advised her that she should see our IBD clinic for further care.  I do not think this is an on reasonable proposition however I do feel comfortable treating her.  The patient and the family, given that they were told to see the IBD Clinic, now have their mind set on seeing someone in the IBD Clinic.    I was considering offering her Entyvio in an effort to calm down the colitis and then re-surveillance her colon in approximately 6 months.  I did explain to them and Dr. Kapadia did explain to them that we cannot guarantee that she will be protected from colon cancer and they understand this.    Thanks  Lyle.

## 2020-12-21 NOTE — TELEPHONE ENCOUNTER
Spoke with patients daughter and patient was a little confused about who the appointment was with. She said that she did not want to see 3 GI doctors. I explained to the patient that she would be seeing a IBD specialist that Dr. Wilburn referred her to not a general GI doctor. She declined appointment before because she said that it would be an hour drive for them. She has now agreed to schedule with Dr. Zazueta. She said just to make sure that it is after the holidays so they can plan to make it to the appointment.

## 2020-12-29 ENCOUNTER — DOCUMENT SCAN (OUTPATIENT)
Dept: HOME HEALTH SERVICES | Facility: HOSPITAL | Age: 85
End: 2020-12-29
Payer: MEDICARE

## 2020-12-30 ENCOUNTER — TELEPHONE (OUTPATIENT)
Dept: FAMILY MEDICINE | Facility: CLINIC | Age: 85
End: 2020-12-30

## 2021-01-07 ENCOUNTER — DOCUMENT SCAN (OUTPATIENT)
Dept: HOME HEALTH SERVICES | Facility: HOSPITAL | Age: 86
End: 2021-01-07
Payer: MEDICARE

## 2021-01-12 PROCEDURE — G0179 MD RECERTIFICATION HHA PT: HCPCS | Mod: ,,, | Performed by: FAMILY MEDICINE

## 2021-01-12 PROCEDURE — G0179 PR HOME HEALTH MD RECERTIFICATION: ICD-10-PCS | Mod: ,,, | Performed by: FAMILY MEDICINE

## 2021-01-19 ENCOUNTER — TELEPHONE (OUTPATIENT)
Dept: GASTROENTEROLOGY | Facility: CLINIC | Age: 86
End: 2021-01-19

## 2021-01-19 ENCOUNTER — PATIENT OUTREACH (OUTPATIENT)
Dept: HOME HEALTH SERVICES | Facility: HOSPITAL | Age: 86
End: 2021-01-19

## 2021-01-20 ENCOUNTER — EXTERNAL HOME HEALTH (OUTPATIENT)
Dept: HOME HEALTH SERVICES | Facility: HOSPITAL | Age: 86
End: 2021-01-20
Payer: MEDICARE

## 2021-02-02 ENCOUNTER — HOSPITAL ENCOUNTER (EMERGENCY)
Facility: HOSPITAL | Age: 86
Discharge: HOME | End: 2021-02-02
Attending: EMERGENCY MEDICINE
Payer: MEDICARE

## 2021-02-02 VITALS
OXYGEN SATURATION: 95 % | WEIGHT: 108.38 LBS | TEMPERATURE: 98 F | HEART RATE: 72 BPM | BODY MASS INDEX: 19.19 KG/M2 | SYSTOLIC BLOOD PRESSURE: 159 MMHG | DIASTOLIC BLOOD PRESSURE: 74 MMHG | RESPIRATION RATE: 18 BRPM

## 2021-02-02 DIAGNOSIS — S46.911A SHOULDER STRAIN, RIGHT, INITIAL ENCOUNTER: Primary | ICD-10-CM
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
## 2021-02-02 PROCEDURE — 25000003 PHARM REV CODE 250: Performed by: EMERGENCY MEDICINE

## 2021-02-02 PROCEDURE — 99283 EMERGENCY DEPT VISIT LOW MDM: CPT | Mod: 25

## 2021-02-02 RX ORDER — ACETAMINOPHEN 325 MG/1
650 TABLET ORAL
Status: COMPLETED | OUTPATIENT
Start: 2021-02-02 | End: 2021-02-02

## 2021-02-02 RX ADMIN — ACETAMINOPHEN 650 MG: 325 TABLET ORAL at 01:02

## 2021-02-03 ENCOUNTER — PES CALL (OUTPATIENT)
Dept: ADMINISTRATIVE | Facility: CLINIC | Age: 86
End: 2021-02-03

## 2021-02-04 ENCOUNTER — TELEPHONE (OUTPATIENT)
Dept: FAMILY MEDICINE | Facility: CLINIC | Age: 86
End: 2021-02-04

## 2021-02-14 PROCEDURE — 99358 PR PROLONGED SERV,NO CONTACT,1ST HR: ICD-10-PCS | Mod: ,,, | Performed by: INTERNAL MEDICINE

## 2021-02-14 PROCEDURE — 99358 PROLONG SERVICE W/O CONTACT: CPT | Mod: ,,, | Performed by: INTERNAL MEDICINE

## 2021-02-17 ENCOUNTER — TELEPHONE (OUTPATIENT)
Dept: GASTROENTEROLOGY | Facility: CLINIC | Age: 86
End: 2021-02-17

## 2021-03-09 ENCOUNTER — LAB VISIT (OUTPATIENT)
Dept: LAB | Facility: HOSPITAL | Age: 86
End: 2021-03-09
Attending: INTERNAL MEDICINE
Payer: MEDICARE

## 2021-03-09 ENCOUNTER — OFFICE VISIT (OUTPATIENT)
Dept: GASTROENTEROLOGY | Facility: CLINIC | Age: 86
End: 2021-03-09
Payer: MEDICARE

## 2021-03-09 VITALS
DIASTOLIC BLOOD PRESSURE: 62 MMHG | HEIGHT: 63 IN | WEIGHT: 114 LBS | SYSTOLIC BLOOD PRESSURE: 132 MMHG | OXYGEN SATURATION: 96 % | BODY MASS INDEX: 20.2 KG/M2 | TEMPERATURE: 98 F

## 2021-03-09 DIAGNOSIS — K51.90 CHRONIC ULCERATIVE COLITIS WITHOUT COMPLICATION, UNSPECIFIED LOCATION: Primary | ICD-10-CM

## 2021-03-09 DIAGNOSIS — I21.4 NSTEMI (NON-ST ELEVATED MYOCARDIAL INFARCTION): ICD-10-CM

## 2021-03-09 DIAGNOSIS — K51.90 CHRONIC ULCERATIVE COLITIS WITHOUT COMPLICATION, UNSPECIFIED LOCATION: ICD-10-CM

## 2021-03-09 DIAGNOSIS — Z86.711 HISTORY OF PULMONARY EMBOLISM: ICD-10-CM

## 2021-03-09 DIAGNOSIS — D64.9 ANEMIA, UNSPECIFIED TYPE: ICD-10-CM

## 2021-03-09 DIAGNOSIS — K63.9 DYSPLASIA-ASSOCIATED LESION OR MASS (DALM) OF COLON: ICD-10-CM

## 2021-03-09 LAB
ALBUMIN SERPL BCP-MCNC: 3.4 G/DL (ref 3.5–5.2)
ALP SERPL-CCNC: 97 U/L (ref 55–135)
ALT SERPL W/O P-5'-P-CCNC: 6 U/L (ref 10–44)
ANION GAP SERPL CALC-SCNC: 9 MMOL/L (ref 8–16)
AST SERPL-CCNC: 12 U/L (ref 10–40)
BASOPHILS # BLD AUTO: 0.09 K/UL (ref 0–0.2)
BASOPHILS NFR BLD: 1 % (ref 0–1.9)
BILIRUB SERPL-MCNC: 0.3 MG/DL (ref 0.1–1)
BUN SERPL-MCNC: 23 MG/DL (ref 8–23)
CALCIUM SERPL-MCNC: 9.1 MG/DL (ref 8.7–10.5)
CHLORIDE SERPL-SCNC: 106 MMOL/L (ref 95–110)
CO2 SERPL-SCNC: 25 MMOL/L (ref 23–29)
CREAT SERPL-MCNC: 0.9 MG/DL (ref 0.5–1.4)
CRP SERPL-MCNC: 8.4 MG/L (ref 0–8.2)
DIFFERENTIAL METHOD: ABNORMAL
EOSINOPHIL # BLD AUTO: 0.3 K/UL (ref 0–0.5)
EOSINOPHIL NFR BLD: 3.4 % (ref 0–8)
ERYTHROCYTE [DISTWIDTH] IN BLOOD BY AUTOMATED COUNT: 18 % (ref 11.5–14.5)
EST. GFR  (AFRICAN AMERICAN): >60 ML/MIN/1.73 M^2
EST. GFR  (NON AFRICAN AMERICAN): 58.1 ML/MIN/1.73 M^2
GLUCOSE SERPL-MCNC: 76 MG/DL (ref 70–110)
HCT VFR BLD AUTO: 38.3 % (ref 37–48.5)
HGB BLD-MCNC: 11.5 G/DL (ref 12–16)
IMM GRANULOCYTES # BLD AUTO: 0.04 K/UL (ref 0–0.04)
IMM GRANULOCYTES NFR BLD AUTO: 0.4 % (ref 0–0.5)
IRON SERPL-MCNC: 28 UG/DL (ref 30–160)
LYMPHOCYTES # BLD AUTO: 2.2 K/UL (ref 1–4.8)
LYMPHOCYTES NFR BLD: 24.4 % (ref 18–48)
MCH RBC QN AUTO: 26.1 PG (ref 27–31)
MCHC RBC AUTO-ENTMCNC: 30 G/DL (ref 32–36)
MCV RBC AUTO: 87 FL (ref 82–98)
MONOCYTES # BLD AUTO: 0.8 K/UL (ref 0.3–1)
MONOCYTES NFR BLD: 8.4 % (ref 4–15)
NEUTROPHILS # BLD AUTO: 5.7 K/UL (ref 1.8–7.7)
NEUTROPHILS NFR BLD: 62.4 % (ref 38–73)
NRBC BLD-RTO: 0 /100 WBC
PLATELET # BLD AUTO: 339 K/UL (ref 150–350)
PMV BLD AUTO: 10.4 FL (ref 9.2–12.9)
POTASSIUM SERPL-SCNC: 4.1 MMOL/L (ref 3.5–5.1)
PROT SERPL-MCNC: 7.3 G/DL (ref 6–8.4)
RBC # BLD AUTO: 4.41 M/UL (ref 4–5.4)
SATURATED IRON: 8 % (ref 20–50)
SODIUM SERPL-SCNC: 140 MMOL/L (ref 136–145)
TOTAL IRON BINDING CAPACITY: 364 UG/DL (ref 250–450)
TRANSFERRIN SERPL-MCNC: 246 MG/DL (ref 200–375)
WBC # BLD AUTO: 9.17 K/UL (ref 3.9–12.7)

## 2021-03-09 PROCEDURE — 86140 C-REACTIVE PROTEIN: CPT | Performed by: INTERNAL MEDICINE

## 2021-03-09 PROCEDURE — 83540 ASSAY OF IRON: CPT | Performed by: INTERNAL MEDICINE

## 2021-03-09 PROCEDURE — 99214 OFFICE O/P EST MOD 30 MIN: CPT | Mod: S$GLB,,, | Performed by: INTERNAL MEDICINE

## 2021-03-09 PROCEDURE — 80053 COMPREHEN METABOLIC PANEL: CPT | Performed by: INTERNAL MEDICINE

## 2021-03-09 PROCEDURE — 36415 COLL VENOUS BLD VENIPUNCTURE: CPT | Performed by: INTERNAL MEDICINE

## 2021-03-09 PROCEDURE — 85025 COMPLETE CBC W/AUTO DIFF WBC: CPT | Performed by: INTERNAL MEDICINE

## 2021-03-09 PROCEDURE — 82728 ASSAY OF FERRITIN: CPT | Performed by: INTERNAL MEDICINE

## 2021-03-09 PROCEDURE — 99214 PR OFFICE/OUTPT VISIT, EST, LEVL IV, 30-39 MIN: ICD-10-PCS | Mod: S$GLB,,, | Performed by: INTERNAL MEDICINE

## 2021-03-09 RX ORDER — SULFASALAZINE 500 MG/1
1000 TABLET ORAL 2 TIMES DAILY
Qty: 120 TABLET | Refills: 5 | Status: SHIPPED | OUTPATIENT
Start: 2021-03-09 | End: 2021-04-08

## 2021-03-09 RX ORDER — FERROUS SULFATE 325(65) MG
325 TABLET ORAL DAILY
COMMUNITY

## 2021-03-10 ENCOUNTER — PATIENT MESSAGE (OUTPATIENT)
Dept: GASTROENTEROLOGY | Facility: CLINIC | Age: 86
End: 2021-03-10

## 2021-03-10 LAB — FERRITIN SERPL-MCNC: 18 NG/ML (ref 20–300)

## 2021-03-31 ENCOUNTER — EXTERNAL CHRONIC CARE MANAGEMENT (OUTPATIENT)
Dept: PRIMARY CARE CLINIC | Facility: CLINIC | Age: 86
End: 2021-03-31
Payer: MEDICARE

## 2021-03-31 PROCEDURE — 99999 PR STA SHADOW: CPT | Mod: PBBFAC,,, | Performed by: FAMILY MEDICINE

## 2021-03-31 PROCEDURE — 99490 CHRNC CARE MGMT STAFF 1ST 20: CPT | Mod: PBBFAC | Performed by: FAMILY MEDICINE

## 2021-03-31 PROCEDURE — 99999 PR STA SHADOW: ICD-10-PCS | Mod: PBBFAC,,, | Performed by: FAMILY MEDICINE

## 2021-04-30 ENCOUNTER — EXTERNAL CHRONIC CARE MANAGEMENT (OUTPATIENT)
Dept: PRIMARY CARE CLINIC | Facility: CLINIC | Age: 86
End: 2021-04-30
Payer: MEDICARE

## 2021-04-30 PROCEDURE — 99999 PR STA SHADOW: ICD-10-PCS | Mod: PBBFAC,,, | Performed by: FAMILY MEDICINE

## 2021-04-30 PROCEDURE — 99490 CHRNC CARE MGMT STAFF 1ST 20: CPT | Mod: PBBFAC | Performed by: FAMILY MEDICINE

## 2021-04-30 PROCEDURE — 99999 PR STA SHADOW: CPT | Mod: PBBFAC,,, | Performed by: FAMILY MEDICINE

## 2021-05-26 ENCOUNTER — TELEPHONE (OUTPATIENT)
Dept: GASTROENTEROLOGY | Facility: CLINIC | Age: 86
End: 2021-05-26

## 2021-05-31 ENCOUNTER — EXTERNAL CHRONIC CARE MANAGEMENT (OUTPATIENT)
Dept: PRIMARY CARE CLINIC | Facility: CLINIC | Age: 86
End: 2021-05-31
Payer: MEDICARE

## 2021-05-31 PROCEDURE — 99490 CHRNC CARE MGMT STAFF 1ST 20: CPT | Mod: PBBFAC | Performed by: FAMILY MEDICINE

## 2021-05-31 PROCEDURE — 99999 PR STA SHADOW: ICD-10-PCS | Mod: PBBFAC,,, | Performed by: FAMILY MEDICINE

## 2021-05-31 PROCEDURE — 99999 PR STA SHADOW: CPT | Mod: PBBFAC,,, | Performed by: FAMILY MEDICINE

## 2021-06-01 ENCOUNTER — TELEPHONE (OUTPATIENT)
Dept: GASTROENTEROLOGY | Facility: CLINIC | Age: 86
End: 2021-06-01

## 2021-06-08 ENCOUNTER — LAB VISIT (OUTPATIENT)
Dept: LAB | Facility: HOSPITAL | Age: 86
End: 2021-06-08
Attending: INTERNAL MEDICINE
Payer: MEDICARE

## 2021-06-08 ENCOUNTER — TELEPHONE (OUTPATIENT)
Dept: GASTROENTEROLOGY | Facility: CLINIC | Age: 86
End: 2021-06-08

## 2021-06-08 DIAGNOSIS — K51.90 CHRONIC ULCERATIVE COLITIS WITHOUT COMPLICATION, UNSPECIFIED LOCATION: ICD-10-CM

## 2021-06-08 PROCEDURE — 83993 ASSAY FOR CALPROTECTIN FECAL: CPT | Performed by: INTERNAL MEDICINE

## 2021-06-12 LAB — CALPROTECTIN STL-MCNT: 330.9 MCG/G

## 2021-06-16 ENCOUNTER — PATIENT MESSAGE (OUTPATIENT)
Dept: GASTROENTEROLOGY | Facility: CLINIC | Age: 86
End: 2021-06-16

## 2021-06-18 ENCOUNTER — TELEPHONE (OUTPATIENT)
Dept: GASTROENTEROLOGY | Facility: CLINIC | Age: 86
End: 2021-06-18

## 2021-06-30 ENCOUNTER — EXTERNAL CHRONIC CARE MANAGEMENT (OUTPATIENT)
Dept: PRIMARY CARE CLINIC | Facility: CLINIC | Age: 86
End: 2021-06-30
Payer: MEDICARE

## 2021-06-30 PROCEDURE — 99999 PR STA SHADOW: CPT | Mod: PBBFAC,,, | Performed by: FAMILY MEDICINE

## 2021-06-30 PROCEDURE — 99999 PR STA SHADOW: ICD-10-PCS | Mod: PBBFAC,,, | Performed by: FAMILY MEDICINE

## 2021-06-30 PROCEDURE — 99490 CHRNC CARE MGMT STAFF 1ST 20: CPT | Mod: PBBFAC | Performed by: FAMILY MEDICINE

## 2021-07-12 RX ORDER — METOPROLOL TARTRATE 100 MG/1
100 TABLET ORAL DAILY
Qty: 90 TABLET | Refills: 1 | Status: SHIPPED | OUTPATIENT
Start: 2021-07-12 | End: 2021-11-15

## 2021-10-31 ENCOUNTER — EXTERNAL CHRONIC CARE MANAGEMENT (OUTPATIENT)
Dept: PRIMARY CARE CLINIC | Facility: CLINIC | Age: 86
End: 2021-10-31
Payer: MEDICARE

## 2021-10-31 PROCEDURE — 99999 PR STA SHADOW: CPT | Mod: PBBFAC,,, | Performed by: FAMILY MEDICINE

## 2021-10-31 PROCEDURE — 99490 CHRNC CARE MGMT STAFF 1ST 20: CPT | Mod: S$PBB | Performed by: FAMILY MEDICINE

## 2021-10-31 PROCEDURE — 99999 PR STA SHADOW: ICD-10-PCS | Mod: PBBFAC,,, | Performed by: FAMILY MEDICINE

## 2021-11-30 ENCOUNTER — EXTERNAL CHRONIC CARE MANAGEMENT (OUTPATIENT)
Dept: PRIMARY CARE CLINIC | Facility: CLINIC | Age: 86
End: 2021-11-30
Payer: MEDICARE

## 2021-11-30 PROCEDURE — 99490 CHRNC CARE MGMT STAFF 1ST 20: CPT | Mod: PBBFAC | Performed by: FAMILY MEDICINE

## 2021-11-30 PROCEDURE — 99999 PR STA SHADOW: CPT | Mod: PBBFAC,,, | Performed by: FAMILY MEDICINE

## 2021-11-30 PROCEDURE — 99999 PR STA SHADOW: ICD-10-PCS | Mod: PBBFAC,,, | Performed by: FAMILY MEDICINE

## 2021-12-20 ENCOUNTER — TELEPHONE (OUTPATIENT)
Dept: FAMILY MEDICINE | Facility: CLINIC | Age: 86
End: 2021-12-20
Payer: MEDICARE

## 2022-02-01 ENCOUNTER — APPOINTMENT (OUTPATIENT)
Dept: RADIOLOGY | Facility: CLINIC | Age: 87
End: 2022-02-01
Attending: FAMILY MEDICINE
Payer: MEDICARE

## 2022-02-01 ENCOUNTER — OFFICE VISIT (OUTPATIENT)
Dept: FAMILY MEDICINE | Facility: CLINIC | Age: 87
End: 2022-02-01
Payer: MEDICARE

## 2022-02-01 VITALS
DIASTOLIC BLOOD PRESSURE: 78 MMHG | HEART RATE: 60 BPM | RESPIRATION RATE: 16 BRPM | TEMPERATURE: 98 F | SYSTOLIC BLOOD PRESSURE: 136 MMHG | HEIGHT: 63 IN | BODY MASS INDEX: 20.19 KG/M2

## 2022-02-01 DIAGNOSIS — K51.90 CHRONIC ULCERATIVE COLITIS WITHOUT COMPLICATION, UNSPECIFIED LOCATION: ICD-10-CM

## 2022-02-01 DIAGNOSIS — S00.83XA FACIAL HEMATOMA, INITIAL ENCOUNTER: ICD-10-CM

## 2022-02-01 DIAGNOSIS — Z74.09 IMPAIRED MOBILITY: Primary | ICD-10-CM

## 2022-02-01 DIAGNOSIS — M50.90 CERVICAL NECK PAIN WITH EVIDENCE OF DISC DISEASE: ICD-10-CM

## 2022-02-01 DIAGNOSIS — M25.562 PAIN IN LATERAL PORTION OF LEFT KNEE: ICD-10-CM

## 2022-02-01 DIAGNOSIS — R25.1 TREMOR: ICD-10-CM

## 2022-02-01 PROCEDURE — 99999 PR STA SHADOW: ICD-10-PCS | Mod: PBBFAC,,, | Performed by: FAMILY MEDICINE

## 2022-02-01 PROCEDURE — 73560 X-RAY EXAM OF KNEE 1 OR 2: CPT | Mod: 26,LT,, | Performed by: RADIOLOGY

## 2022-02-01 PROCEDURE — 99213 OFFICE O/P EST LOW 20 MIN: CPT | Mod: S$PBB | Performed by: FAMILY MEDICINE

## 2022-02-01 PROCEDURE — 99213 OFFICE O/P EST LOW 20 MIN: CPT | Mod: PBBFAC | Performed by: FAMILY MEDICINE

## 2022-02-01 PROCEDURE — 73560 X-RAY EXAM OF KNEE 1 OR 2: CPT | Mod: TC,PO,LT

## 2022-02-01 PROCEDURE — 73560 XR KNEE 1 OR 2 VIEW LEFT: ICD-10-PCS | Mod: 26,LT,, | Performed by: RADIOLOGY

## 2022-02-01 PROCEDURE — 99999 PR STA SHADOW: CPT | Mod: PBBFAC,,, | Performed by: FAMILY MEDICINE

## 2022-02-01 PROCEDURE — 99999 PR PBB SHADOW E&M-EST. PATIENT-LVL III: CPT | Mod: PBBFAC,,, | Performed by: FAMILY MEDICINE

## 2022-02-01 RX ORDER — MUPIROCIN 20 MG/G
OINTMENT TOPICAL
Qty: 22 G | Refills: 5 | Status: SHIPPED | OUTPATIENT
Start: 2022-02-01

## 2022-02-01 NOTE — PROGRESS NOTES
Subjective:       Patient ID: Katarina Rivera is a 87 y.o. female.    Chief Complaint: Fall (Fell on Sunday and hit head. Neck pain and had pain. )    Pt is a 87 y.o. female who presents for assessment of neck pain after falling at him and hurting her neck.Impaired mobility  (primary encounter diagnosis)  Chronic ulcerative colitis without complication, unspecified location  Cervical neck pain with evidence of disc disease. Doing well on current meds. Denies any side effects. Prevention is not up to date.    Review of Systems   Constitutional: Negative for appetite change, chills and fever.   HENT: Negative for rhinorrhea, sinus pressure, sore throat and trouble swallowing.    Respiratory: Negative for cough, chest tightness, shortness of breath and wheezing.    Cardiovascular: Negative for chest pain and palpitations.        On xarelto   Gastrointestinal: Negative for abdominal pain, blood in stool, diarrhea, nausea and vomiting.   Genitourinary: Negative for dysuria, flank pain, hematuria, pelvic pain, urgency, vaginal bleeding, vaginal discharge and vaginal pain.   Musculoskeletal: Positive for neck pain. Negative for back pain, joint swelling and neck stiffness.   Skin: Negative for rash.   Neurological: Positive for tremors. Negative for dizziness, weakness, light-headedness, numbness and headaches.        Impaired mobility; feet got caught and slipped onto a hard wood floor & hit her head onto the carpet and floor   Hematological: Does not bruise/bleed easily.   Psychiatric/Behavioral: Negative for agitation. The patient is not nervous/anxious.        Objective:      Physical Exam  Constitutional:       Appearance: She is well-developed and well-nourished.   HENT:      Head: Normocephalic.   Eyes:      Pupils: Pupils are equal, round, and reactive to light.   Neck:      Thyroid: No thyromegaly.   Cardiovascular:      Rate and Rhythm: Normal rate and regular rhythm.   Pulmonary:      Effort: No respiratory  distress.      Breath sounds: No wheezing or rales.   Chest:      Chest wall: No tenderness.   Abdominal:      General: There is no distension.      Tenderness: There is no abdominal tenderness. There is no guarding or rebound.   Musculoskeletal:         General: No tenderness or edema. Normal range of motion.      Cervical back: Normal range of motion and neck supple.      Comments: L knee with some crepitance   Lymphadenopathy:      Cervical: No cervical adenopathy.   Skin:     General: Skin is warm and dry.      Coloration: Skin is not pale.      Findings: No rash.      Comments: L forehead with a 2 cm hematoma and R&L orbits with facial discoloration   Neurological:      Mental Status: She is alert and oriented to person, place, and time.      Cranial Nerves: No cranial nerve deficit.      Motor: No abnormal muscle tone.      Coordination: Coordination normal.      Deep Tendon Reflexes: Reflexes are normal and symmetric. Reflexes normal.   Psychiatric:         Mood and Affect: Mood and affect normal.         Thought Content: Thought content normal.         Judgment: Judgment normal.         Assessment:       1. Impaired mobility    2. Chronic ulcerative colitis without complication, unspecified location    3. Cervical neck pain with evidence of disc disease    4. Tremor    5. Pain in lateral portion of left knee    6. Facial hematoma, initial encounter        Plan:   Katarina was seen today for fall.    Diagnoses and all orders for this visit:    Impaired mobility    Chronic ulcerative colitis without complication, unspecified location    Cervical neck pain with evidence of disc disease    Tremor    Pain in lateral portion of left knee    Facial hematoma, initial encounter    Voltaren Gel

## 2022-02-28 ENCOUNTER — EXTERNAL CHRONIC CARE MANAGEMENT (OUTPATIENT)
Dept: PRIMARY CARE CLINIC | Facility: CLINIC | Age: 87
End: 2022-02-28
Payer: MEDICARE

## 2022-02-28 PROCEDURE — 99999 PR STA SHADOW: CPT | Mod: PBBFAC,,, | Performed by: FAMILY MEDICINE

## 2022-02-28 PROCEDURE — 99490 CHRNC CARE MGMT STAFF 1ST 20: CPT | Mod: PBBFAC | Performed by: FAMILY MEDICINE

## 2022-02-28 PROCEDURE — 99999 PR STA SHADOW: ICD-10-PCS | Mod: PBBFAC,,, | Performed by: FAMILY MEDICINE

## 2022-03-31 ENCOUNTER — EXTERNAL CHRONIC CARE MANAGEMENT (OUTPATIENT)
Dept: PRIMARY CARE CLINIC | Facility: CLINIC | Age: 87
End: 2022-03-31
Payer: MEDICARE

## 2022-03-31 PROCEDURE — 99999 PR STA SHADOW: CPT | Mod: PBBFAC,,, | Performed by: FAMILY MEDICINE

## 2022-03-31 PROCEDURE — 99490 CHRNC CARE MGMT STAFF 1ST 20: CPT | Mod: S$PBB | Performed by: FAMILY MEDICINE

## 2022-03-31 PROCEDURE — 99999 PR STA SHADOW: ICD-10-PCS | Mod: PBBFAC,,, | Performed by: FAMILY MEDICINE

## 2022-04-30 ENCOUNTER — EXTERNAL CHRONIC CARE MANAGEMENT (OUTPATIENT)
Dept: PRIMARY CARE CLINIC | Facility: CLINIC | Age: 87
End: 2022-04-30
Payer: MEDICARE

## 2022-04-30 PROCEDURE — 99999 PR STA SHADOW: CPT | Mod: PBBFAC,,, | Performed by: FAMILY MEDICINE

## 2022-04-30 PROCEDURE — 99999 PR STA SHADOW: ICD-10-PCS | Mod: PBBFAC,,, | Performed by: FAMILY MEDICINE

## 2022-04-30 PROCEDURE — 99490 CHRNC CARE MGMT STAFF 1ST 20: CPT | Mod: S$PBB | Performed by: FAMILY MEDICINE

## 2022-05-24 RX ORDER — SULFASALAZINE 500 MG/1
TABLET ORAL
Qty: 60 TABLET | Refills: 5 | OUTPATIENT
Start: 2022-05-24

## 2022-05-31 ENCOUNTER — EXTERNAL CHRONIC CARE MANAGEMENT (OUTPATIENT)
Dept: PRIMARY CARE CLINIC | Facility: CLINIC | Age: 87
End: 2022-05-31
Payer: MEDICARE

## 2022-05-31 PROCEDURE — 99999 PR STA SHADOW: ICD-10-PCS | Mod: PBBFAC,,, | Performed by: FAMILY MEDICINE

## 2022-05-31 PROCEDURE — 99490 CHRNC CARE MGMT STAFF 1ST 20: CPT | Mod: PBBFAC | Performed by: FAMILY MEDICINE

## 2022-05-31 PROCEDURE — 99999 PR STA SHADOW: CPT | Mod: PBBFAC,,, | Performed by: FAMILY MEDICINE

## 2022-06-30 ENCOUNTER — EXTERNAL CHRONIC CARE MANAGEMENT (OUTPATIENT)
Dept: PRIMARY CARE CLINIC | Facility: CLINIC | Age: 87
End: 2022-06-30
Payer: MEDICARE

## 2022-06-30 PROCEDURE — 99999 PR STA SHADOW: ICD-10-PCS | Mod: PBBFAC,,, | Performed by: FAMILY MEDICINE

## 2022-06-30 PROCEDURE — 99490 CHRNC CARE MGMT STAFF 1ST 20: CPT | Mod: PBBFAC | Performed by: FAMILY MEDICINE

## 2022-06-30 PROCEDURE — 99999 PR STA SHADOW: CPT | Mod: PBBFAC,,, | Performed by: FAMILY MEDICINE

## 2022-07-07 ENCOUNTER — TELEPHONE (OUTPATIENT)
Dept: FAMILY MEDICINE | Facility: CLINIC | Age: 87
End: 2022-07-07
Payer: MEDICARE

## 2022-07-07 NOTE — TELEPHONE ENCOUNTER
----- Message from Lee Tijerina sent at 2022  1:32 PM CDT -----  Contact: Sami  Katarina Rivera  MRN: [id number]  : 1934  PCP: Ethan Denton  Home Phone      [phone]  Work Phone      Not on file.  Mobile          [phone]      MESSAGE: Sami -- patient being released from [facility] with home health -- called to see if Dr Denton would follow her - I did agree - if this a problem please contact Sami    PCP: Bertin

## 2022-07-08 ENCOUNTER — EXTERNAL HOSPITAL ADMISSION (OUTPATIENT)
Dept: ADMINISTRATIVE | Facility: CLINIC | Age: 87
End: 2022-07-08
Payer: MEDICARE

## 2022-07-08 ENCOUNTER — PATIENT OUTREACH (OUTPATIENT)
Dept: ADMINISTRATIVE | Facility: CLINIC | Age: 87
End: 2022-07-08
Payer: MEDICARE

## 2022-07-08 PROCEDURE — G0180 PR HOME HEALTH MD CERTIFICATION: ICD-10-PCS | Mod: ,,, | Performed by: FAMILY MEDICINE

## 2022-07-08 PROCEDURE — G0180 MD CERTIFICATION HHA PATIENT: HCPCS | Mod: ,,, | Performed by: FAMILY MEDICINE

## 2022-07-14 ENCOUNTER — OFFICE VISIT (OUTPATIENT)
Dept: INTERNAL MEDICINE | Facility: CLINIC | Age: 87
End: 2022-07-14
Payer: MEDICARE

## 2022-07-14 VITALS
SYSTOLIC BLOOD PRESSURE: 128 MMHG | BODY MASS INDEX: 18.78 KG/M2 | WEIGHT: 106 LBS | HEIGHT: 63 IN | OXYGEN SATURATION: 96 % | RESPIRATION RATE: 16 BRPM | DIASTOLIC BLOOD PRESSURE: 60 MMHG | HEART RATE: 84 BPM

## 2022-07-14 DIAGNOSIS — S72.001E TYPE I OR II OPEN FRACTURE OF RIGHT HIP WITH ROUTINE HEALING, SUBSEQUENT ENCOUNTER: ICD-10-CM

## 2022-07-14 DIAGNOSIS — Z78.9 IMPAIRED MOBILITY AND ACTIVITIES OF DAILY LIVING: ICD-10-CM

## 2022-07-14 DIAGNOSIS — K51.011 ULCERATIVE PANCOLITIS WITH RECTAL BLEEDING: Primary | ICD-10-CM

## 2022-07-14 DIAGNOSIS — Z74.09 IMPAIRED MOBILITY AND ACTIVITIES OF DAILY LIVING: ICD-10-CM

## 2022-07-14 PROCEDURE — 99999 PR PBB SHADOW E&M-EST. PATIENT-LVL III: ICD-10-PCS | Mod: PBBFAC,,, | Performed by: FAMILY MEDICINE

## 2022-07-14 PROCEDURE — 99999 PR PBB SHADOW E&M-EST. PATIENT-LVL III: CPT | Mod: PBBFAC,,, | Performed by: FAMILY MEDICINE

## 2022-07-14 PROCEDURE — 99213 OFFICE O/P EST LOW 20 MIN: CPT | Mod: S$PBB,,, | Performed by: FAMILY MEDICINE

## 2022-07-14 PROCEDURE — 99213 PR OFFICE/OUTPT VISIT, EST, LEVL III, 20-29 MIN: ICD-10-PCS | Mod: S$PBB,,, | Performed by: FAMILY MEDICINE

## 2022-07-14 PROCEDURE — 99213 OFFICE O/P EST LOW 20 MIN: CPT | Mod: PBBFAC,PN | Performed by: FAMILY MEDICINE

## 2022-07-14 RX ORDER — SULFASALAZINE 500 MG/1
500 TABLET, DELAYED RELEASE ORAL 2 TIMES DAILY
Qty: 180 TABLET | Refills: 3 | Status: SHIPPED | OUTPATIENT
Start: 2022-07-14 | End: 2022-07-15 | Stop reason: SDUPTHER

## 2022-07-14 RX ORDER — SULFASALAZINE 500 MG/1
500 TABLET, DELAYED RELEASE ORAL 2 TIMES DAILY
COMMUNITY
End: 2022-07-14 | Stop reason: SDUPTHER

## 2022-07-14 RX ORDER — AMLODIPINE BESYLATE 5 MG/1
5 TABLET ORAL DAILY
COMMUNITY
Start: 2022-07-07

## 2022-07-14 RX ORDER — ESCITALOPRAM OXALATE 10 MG/1
10 TABLET ORAL DAILY
COMMUNITY
Start: 2022-07-07

## 2022-07-14 RX ORDER — METOPROLOL TARTRATE 50 MG/1
50 TABLET ORAL DAILY
COMMUNITY
Start: 2022-07-07

## 2022-07-14 RX ORDER — TRAMADOL HYDROCHLORIDE 50 MG/1
50 TABLET ORAL EVERY 6 HOURS PRN
COMMUNITY
Start: 2022-07-07

## 2022-07-14 RX ORDER — OMEPRAZOLE 40 MG/1
40 CAPSULE, DELAYED RELEASE ORAL DAILY
COMMUNITY
Start: 2022-07-07

## 2022-07-14 NOTE — PROGRESS NOTES
Subjective:       Patient ID: Katarina Rivera is a 88 y.o. female.    Chief Complaint: Follow-up (Patient here today for hospital discharge visit [facility])    Pt is a 88 y.o. female who presents for check up for FU for a fractured R hip 6-22-22. Doing well on current meds. Denies any side effects. Prevention is up to date.    Review of Systems   Constitutional: Negative for appetite change, chills and fever.   HENT: Negative for rhinorrhea, sinus pressure, sore throat and trouble swallowing.    Respiratory: Negative for cough, chest tightness, shortness of breath and wheezing.    Cardiovascular: Negative for chest pain and palpitations.   Gastrointestinal: Negative for blood in stool, diarrhea, nausea and vomiting.   Genitourinary: Negative for dysuria, flank pain, hematuria, pelvic pain, urgency, vaginal bleeding, vaginal discharge and vaginal pain.   Musculoskeletal: Positive for arthralgias, gait problem and joint swelling. Negative for back pain and neck stiffness.        S/P R hip fracture and repair with Dr ADRIANA Candelaria   Skin: Negative for rash.   Neurological: Negative for dizziness, weakness, light-headedness, numbness and headaches.        Pt is post-op hip surgery and has an advanced tremor, requiring her to use a Wheelchair due to these 2 issues; she is not able to handle a cane nor walker for her mobility; she is willing and able to handle a self propel light-weight wheelchair   Hematological: Does not bruise/bleed easily.   Psychiatric/Behavioral: Negative for agitation. The patient is not nervous/anxious.        Objective:      Physical Exam  Constitutional:       Appearance: She is well-developed.   HENT:      Head: Normocephalic.   Eyes:      Pupils: Pupils are equal, round, and reactive to light.   Neck:      Thyroid: No thyromegaly.   Cardiovascular:      Rate and Rhythm: Normal rate and regular rhythm.   Pulmonary:      Effort: No respiratory distress.      Breath sounds: No wheezing or rales.   Chest:       Chest wall: No tenderness.   Abdominal:      General: There is no distension.      Tenderness: There is no abdominal tenderness. There is no guarding or rebound.   Musculoskeletal:         General: No tenderness.      Cervical back: Normal range of motion and neck supple.      Comments: In a W/C from a fractured R hip 6-22-22   Lymphadenopathy:      Cervical: No cervical adenopathy.   Skin:     General: Skin is warm and dry.      Coloration: Skin is not pale.      Findings: No rash.   Neurological:      Mental Status: She is alert and oriented to person, place, and time.      Cranial Nerves: No cranial nerve deficit.      Motor: No abnormal muscle tone.      Coordination: Coordination normal.      Deep Tendon Reflexes: Reflexes are normal and symmetric. Reflexes normal.   Psychiatric:         Thought Content: Thought content normal.         Judgment: Judgment normal.         Assessment:       No diagnosis found.    Plan:   There are no diagnoses linked to this encounter.

## 2022-07-15 ENCOUNTER — TELEPHONE (OUTPATIENT)
Dept: FAMILY MEDICINE | Facility: CLINIC | Age: 87
End: 2022-07-15
Payer: MEDICARE

## 2022-07-15 RX ORDER — SULFASALAZINE 500 MG/1
TABLET, DELAYED RELEASE ORAL
Qty: 180 TABLET | Refills: 3 | Status: SHIPPED | OUTPATIENT
Start: 2022-07-15

## 2022-07-15 RX ORDER — SULFASALAZINE 500 MG/1
TABLET, DELAYED RELEASE ORAL
Qty: 180 TABLET | Refills: 3 | OUTPATIENT
Start: 2022-07-15

## 2022-07-15 NOTE — TELEPHONE ENCOUNTER
----- Message from Sylvia Padilla sent at 7/15/2022  9:12 AM CDT -----  Contact: zeynep[facility]pharm  Katarina OMKRA Saludin  MRN: [id number]  : 1934  PCP: Ethan Denton  Home Phone      [phone]  Work Phone      Not on file.  Mobile          [phone]      MESSAGE:   Two sets of directions on script [facility] into [facility] for  sulfaSALAzine (AZULFIDINE) 500 MG EC tablet    Needling clarification on which is correct.    Please advise      [phone]

## 2022-07-15 NOTE — TELEPHONE ENCOUNTER
Please advise script on sulfasalasine states take 1 tablet by mouth twice daily and it states take 2 tablets by mouth twice daily. Please advise which direction is correct

## 2022-07-18 ENCOUNTER — TELEPHONE (OUTPATIENT)
Dept: FAMILY MEDICINE | Facility: CLINIC | Age: 87
End: 2022-07-18
Payer: MEDICARE

## 2022-07-18 NOTE — TELEPHONE ENCOUNTER
Spoke with Erin/Dr Denton's nurse--she stated that the orders were faxed to [facility] in [place]. Spoke with Sandra and gave her this info. She will contact them  [phone]

## 2022-07-18 NOTE — TELEPHONE ENCOUNTER
----- Message from Sylvia Padilla sent at 2022  9:15 AM CDT -----  Contact: cristal/daughter  Katarina Rivera  MRN: [id number]  : 1934  PCP: Ethan Denton  Home Phone      [phone]  Work Phone      Not on file.  Mobile          [phone]      MESSAGE:   Daughter calling for update on wheelchair order. Requesting a call to let her know if it is going to be delivered.     [phone]

## 2022-07-19 ENCOUNTER — TELEPHONE (OUTPATIENT)
Dept: FAMILY MEDICINE | Facility: CLINIC | Age: 87
End: 2022-07-19
Payer: MEDICARE

## 2022-07-19 NOTE — TELEPHONE ENCOUNTER
----- Message from Sylvia Padilla sent at 2022  2:36 PM CDT -----  Contact: cristal/daughter  Katarina Rivera  MRN: [id number]  : 1934  PCP: Ethan Denton  Home Phone      [phone]  Work Phone      Not on file.  Mobile          [phone]      MESSAGE:   Daughter states that she called [facility] and they stated they did not receive the order we told her we faxed.     Gave me a fax number of   [phone]  ATTN: Aleena    They are requesting most recent chart notes as well.    [phone]

## 2022-07-28 ENCOUNTER — EXTERNAL HOME HEALTH (OUTPATIENT)
Dept: HOME HEALTH SERVICES | Facility: HOSPITAL | Age: 87
End: 2022-07-28
Payer: MEDICARE

## 2022-07-31 ENCOUNTER — EXTERNAL CHRONIC CARE MANAGEMENT (OUTPATIENT)
Dept: PRIMARY CARE CLINIC | Facility: CLINIC | Age: 87
End: 2022-07-31
Payer: MEDICARE

## 2022-07-31 PROCEDURE — 99999 PR STA SHADOW: ICD-10-PCS | Mod: PBBFAC,,, | Performed by: FAMILY MEDICINE

## 2022-07-31 PROCEDURE — 99490 CHRNC CARE MGMT STAFF 1ST 20: CPT | Mod: S$PBB | Performed by: FAMILY MEDICINE

## 2022-07-31 PROCEDURE — 99999 PR STA SHADOW: CPT | Mod: PBBFAC,,, | Performed by: FAMILY MEDICINE

## 2022-08-11 ENCOUNTER — DOCUMENT SCAN (OUTPATIENT)
Dept: HOME HEALTH SERVICES | Facility: HOSPITAL | Age: 87
End: 2022-08-11
Payer: MEDICARE

## 2022-08-24 ENCOUNTER — DOCUMENT SCAN (OUTPATIENT)
Dept: HOME HEALTH SERVICES | Facility: HOSPITAL | Age: 87
End: 2022-08-24
Payer: MEDICARE

## 2022-08-31 ENCOUNTER — EXTERNAL CHRONIC CARE MANAGEMENT (OUTPATIENT)
Dept: PRIMARY CARE CLINIC | Facility: CLINIC | Age: 87
End: 2022-08-31
Payer: MEDICARE

## 2022-08-31 PROCEDURE — 99999 PR STA SHADOW: CPT | Mod: PBBFAC,,, | Performed by: FAMILY MEDICINE

## 2022-08-31 PROCEDURE — 99999 PR STA SHADOW: ICD-10-PCS | Mod: PBBFAC,,, | Performed by: FAMILY MEDICINE

## 2022-08-31 PROCEDURE — 99490 CHRNC CARE MGMT STAFF 1ST 20: CPT | Mod: PBBFAC | Performed by: FAMILY MEDICINE

## 2022-09-30 ENCOUNTER — EXTERNAL CHRONIC CARE MANAGEMENT (OUTPATIENT)
Dept: PRIMARY CARE CLINIC | Facility: CLINIC | Age: 87
End: 2022-09-30
Payer: MEDICARE

## 2022-09-30 PROCEDURE — 99999 PR STA SHADOW: ICD-10-PCS | Mod: PBBFAC,,, | Performed by: FAMILY MEDICINE

## 2022-09-30 PROCEDURE — 99490 CHRNC CARE MGMT STAFF 1ST 20: CPT | Mod: S$PBB | Performed by: FAMILY MEDICINE

## 2022-09-30 PROCEDURE — 99999 PR STA SHADOW: CPT | Mod: PBBFAC,,, | Performed by: FAMILY MEDICINE

## 2022-10-31 ENCOUNTER — EXTERNAL CHRONIC CARE MANAGEMENT (OUTPATIENT)
Dept: PRIMARY CARE CLINIC | Facility: CLINIC | Age: 87
End: 2022-10-31
Payer: MEDICARE

## 2022-10-31 PROCEDURE — 99999 PR STA SHADOW: CPT | Mod: PBBFAC,,, | Performed by: FAMILY MEDICINE

## 2022-10-31 PROCEDURE — 99999 PR STA SHADOW: ICD-10-PCS | Mod: PBBFAC,,, | Performed by: FAMILY MEDICINE

## 2022-10-31 PROCEDURE — 99490 CHRNC CARE MGMT STAFF 1ST 20: CPT | Mod: PBBFAC | Performed by: FAMILY MEDICINE

## 2022-11-22 NOTE — TELEPHONE ENCOUNTER
----- Message from Maria Alejandra Jaquez sent at 2020  9:53 AM CST -----  Contact: FAX  Katarina Rivera  MRN: [id number]  : 1934  PCP: Ethan Denton  Home Phone      [phone]  Work Phone      Not on file.  Mobile          Not on file.      MESSAGE:   Pt requesting refill or new Rx.   Is this a refill or new RX:  refill  RX name and strength: rivaroxaban (XARELTO) 20 mg Tab  Last office visit:   Is this a 30-day or 90-day RX:  30-Day  [facility] [place]  Comments:        
Mr. Sanders

## 2022-11-30 ENCOUNTER — EXTERNAL CHRONIC CARE MANAGEMENT (OUTPATIENT)
Dept: PRIMARY CARE CLINIC | Facility: CLINIC | Age: 87
End: 2022-11-30
Payer: MEDICARE

## 2022-11-30 PROCEDURE — 99999 PR STA SHADOW: CPT | Mod: PBBFAC,,, | Performed by: FAMILY MEDICINE

## 2022-11-30 PROCEDURE — 99490 CHRNC CARE MGMT STAFF 1ST 20: CPT | Mod: PBBFAC | Performed by: FAMILY MEDICINE

## 2022-11-30 PROCEDURE — 99439 CHRNC CARE MGMT STAF EA ADDL: CPT | Mod: PBBFAC | Performed by: FAMILY MEDICINE

## 2022-11-30 PROCEDURE — 99999 PR STA SHADOW: ICD-10-PCS | Mod: PBBFAC,,, | Performed by: FAMILY MEDICINE

## 2022-12-31 ENCOUNTER — EXTERNAL CHRONIC CARE MANAGEMENT (OUTPATIENT)
Dept: PRIMARY CARE CLINIC | Facility: CLINIC | Age: 87
End: 2022-12-31
Payer: MEDICARE

## 2022-12-31 PROCEDURE — 99439 CHRNC CARE MGMT STAF EA ADDL: CPT | Mod: S$PBB | Performed by: FAMILY MEDICINE

## 2022-12-31 PROCEDURE — 99999 PR STA SHADOW: ICD-10-PCS | Mod: PBBFAC,,, | Performed by: FAMILY MEDICINE

## 2022-12-31 PROCEDURE — 99490 CHRNC CARE MGMT STAFF 1ST 20: CPT | Mod: PBBFAC,25 | Performed by: FAMILY MEDICINE

## 2022-12-31 PROCEDURE — 99999 PR STA SHADOW: CPT | Mod: PBBFAC,,, | Performed by: FAMILY MEDICINE

## 2023-01-31 ENCOUNTER — EXTERNAL CHRONIC CARE MANAGEMENT (OUTPATIENT)
Dept: PRIMARY CARE CLINIC | Facility: CLINIC | Age: 88
End: 2023-01-31
Payer: MEDICARE

## 2023-01-31 PROCEDURE — 99490 CHRNC CARE MGMT STAFF 1ST 20: CPT | Mod: S$PBB | Performed by: FAMILY MEDICINE

## 2023-01-31 PROCEDURE — 99999 PR STA SHADOW: CPT | Mod: PBBFAC,,, | Performed by: FAMILY MEDICINE

## 2023-01-31 PROCEDURE — 99999 PR STA SHADOW: ICD-10-PCS | Mod: PBBFAC,,, | Performed by: FAMILY MEDICINE

## 2023-02-08 NOTE — PLAN OF CARE
Pt aware, reports he will call back to schedule an appointment   Pt being discharged home today with [facility]. Pt's daughter Constance who is at bedside  can provide help at home as needed and transportation.  Pt has d/c brochure, card, and folder and encouraged to call for any needs/concerns.    Future Appointments   Date Time Provider Department Center   11/17/2020  1:30 PM Ethan Denton MD [facility]   12/3/2020 10:00 AM Augusto Kapadia MD [facility]        11/12/20 1110   Final Note   Assessment Type Final Discharge Note   Anticipated Discharge Disposition Home-Health   What phone number can be called within the next 1-3 days to see how you are doing after discharge? [phone]   Hospital Follow Up  Appt(s) scheduled? Yes   Discharge plans and expectations educations in teach back method with documentation complete? Yes   Right Care Referral Info   Post Acute Recommendation Home-care   Referral Type    [facility]    Post-Acute Status   Home Health Status Set-up Complete

## 2023-02-28 ENCOUNTER — EXTERNAL CHRONIC CARE MANAGEMENT (OUTPATIENT)
Dept: PRIMARY CARE CLINIC | Facility: CLINIC | Age: 88
End: 2023-02-28
Payer: MEDICARE

## 2023-02-28 PROCEDURE — 99999 PR STA SHADOW: ICD-10-PCS | Mod: PBBFAC,,, | Performed by: FAMILY MEDICINE

## 2023-02-28 PROCEDURE — 99999 PR STA SHADOW: CPT | Mod: PBBFAC,,, | Performed by: FAMILY MEDICINE

## 2023-02-28 PROCEDURE — 99490 CHRNC CARE MGMT STAFF 1ST 20: CPT | Mod: PBBFAC | Performed by: FAMILY MEDICINE

## 2023-03-31 ENCOUNTER — EXTERNAL CHRONIC CARE MANAGEMENT (OUTPATIENT)
Dept: PRIMARY CARE CLINIC | Facility: CLINIC | Age: 88
End: 2023-03-31
Payer: MEDICARE

## 2023-03-31 PROCEDURE — 99999 PR STA SHADOW: CPT | Mod: PBBFAC,,, | Performed by: FAMILY MEDICINE

## 2023-03-31 PROCEDURE — 99490 CHRNC CARE MGMT STAFF 1ST 20: CPT | Mod: S$PBB | Performed by: FAMILY MEDICINE

## 2023-03-31 PROCEDURE — 99999 PR STA SHADOW: ICD-10-PCS | Mod: PBBFAC,,, | Performed by: FAMILY MEDICINE

## 2023-04-05 ENCOUNTER — TELEPHONE (OUTPATIENT)
Dept: FAMILY MEDICINE | Facility: CLINIC | Age: 88
End: 2023-04-05
Payer: MEDICARE

## 2023-04-05 NOTE — TELEPHONE ENCOUNTER
----- Message from Nereyda San sent at 2023  1:37 PM CDT -----  Contact: Constance/daughter  Katarina Rivera  MRN: [id number]  : 1934  PCP: Ethan Denton  Home Phone      [phone]  Work Phone      Not on file.  Mobile          [phone]      MESSAGE:   Pt has an appt scheduled for May 4 in [facility] but will need a refill of her XARELTO 20 mg Tab to last until her appt.     [facility]      Phone:  [phone]

## 2023-04-05 NOTE — TELEPHONE ENCOUNTER
Pt is requesting a refill of XARELTO 20 mg tab to last until her appointment on May 4th.     Please advise

## 2023-04-06 DIAGNOSIS — I48.92 ATRIAL FLUTTER, UNSPECIFIED TYPE: Primary | ICD-10-CM

## 2023-04-11 ENCOUNTER — TELEPHONE (OUTPATIENT)
Dept: FAMILY MEDICINE | Facility: CLINIC | Age: 88
End: 2023-04-11
Payer: MEDICARE

## 2023-04-11 NOTE — TELEPHONE ENCOUNTER
----- Message from Sylvia Padilla sent at 2023  2:34 PM CDT -----  Contact: tonio/gisela  Katarina Rivera  MRN: [id number]  : 1934  PCP: Ethan Denton  Home Phone      [phone]  Work Phone      Not on file.  Mobile          [phone]      MESSAGE:   [facility] for XARELTO 20 mg Tab  so she can have a refill for one more month--patient doesn't come to office until 2023 daughter would like call back when we receive the fax. She is not sure if this is a PA or not.      [phone]

## 2023-04-30 ENCOUNTER — EXTERNAL CHRONIC CARE MANAGEMENT (OUTPATIENT)
Dept: PRIMARY CARE CLINIC | Facility: CLINIC | Age: 88
End: 2023-04-30
Payer: MEDICARE

## 2023-04-30 PROCEDURE — 99999 PR STA SHADOW: ICD-10-PCS | Mod: PBBFAC,,, | Performed by: FAMILY MEDICINE

## 2023-04-30 PROCEDURE — 99999 PR STA SHADOW: CPT | Mod: PBBFAC,,, | Performed by: FAMILY MEDICINE

## 2023-04-30 PROCEDURE — 99490 CHRNC CARE MGMT STAFF 1ST 20: CPT | Mod: S$PBB | Performed by: FAMILY MEDICINE

## 2023-05-04 ENCOUNTER — OFFICE VISIT (OUTPATIENT)
Dept: INTERNAL MEDICINE | Facility: CLINIC | Age: 88
End: 2023-05-04
Payer: MEDICARE

## 2023-05-04 VITALS
HEART RATE: 72 BPM | SYSTOLIC BLOOD PRESSURE: 154 MMHG | BODY MASS INDEX: 21.29 KG/M2 | RESPIRATION RATE: 16 BRPM | WEIGHT: 120.13 LBS | DIASTOLIC BLOOD PRESSURE: 72 MMHG | HEIGHT: 63 IN

## 2023-05-04 DIAGNOSIS — E55.9 VITAMIN D INSUFFICIENCY: ICD-10-CM

## 2023-05-04 DIAGNOSIS — K51.011 ULCERATIVE PANCOLITIS WITH RECTAL BLEEDING: ICD-10-CM

## 2023-05-04 DIAGNOSIS — Z79.899 DRUG-INDUCED IMMUNODEFICIENCY: Primary | ICD-10-CM

## 2023-05-04 DIAGNOSIS — D84.821 DRUG-INDUCED IMMUNODEFICIENCY: Primary | ICD-10-CM

## 2023-05-04 DIAGNOSIS — I48.92 ATRIAL FLUTTER, UNSPECIFIED TYPE: ICD-10-CM

## 2023-05-04 DIAGNOSIS — K51.919 ULCERATIVE COLITIS WITH COMPLICATION, UNSPECIFIED LOCATION: ICD-10-CM

## 2023-05-04 DIAGNOSIS — E78.5 HYPERLIPIDEMIA, UNSPECIFIED HYPERLIPIDEMIA TYPE: ICD-10-CM

## 2023-05-04 PROBLEM — I82.4Z3 ACUTE DEEP VEIN THROMBOSIS (DVT) OF DISTAL VEIN OF BOTH LOWER EXTREMITIES: Status: RESOLVED | Noted: 2020-11-17 | Resolved: 2023-05-04

## 2023-05-04 PROCEDURE — 99214 OFFICE O/P EST MOD 30 MIN: CPT | Mod: S$PBB,,, | Performed by: FAMILY MEDICINE

## 2023-05-04 PROCEDURE — 99999 PR PBB SHADOW E&M-EST. PATIENT-LVL III: CPT | Mod: PBBFAC,,, | Performed by: FAMILY MEDICINE

## 2023-05-04 PROCEDURE — 99213 OFFICE O/P EST LOW 20 MIN: CPT | Mod: PBBFAC,PN | Performed by: FAMILY MEDICINE

## 2023-05-04 PROCEDURE — 99214 PR OFFICE/OUTPT VISIT, EST, LEVL IV, 30-39 MIN: ICD-10-PCS | Mod: S$PBB,,, | Performed by: FAMILY MEDICINE

## 2023-05-04 PROCEDURE — 99999 PR PBB SHADOW E&M-EST. PATIENT-LVL III: ICD-10-PCS | Mod: PBBFAC,,, | Performed by: FAMILY MEDICINE

## 2023-05-04 RX ORDER — SULFASALAZINE 500 MG/1
500 TABLET, DELAYED RELEASE ORAL 2 TIMES DAILY
Qty: 180 TABLET | Refills: 3 | Status: SHIPPED | OUTPATIENT
Start: 2023-05-04

## 2023-05-04 NOTE — PROGRESS NOTES
Subjective:       Patient ID: Katarina Rivera is a 88 y.o. female.    Chief Complaint: Medication Refill (Pt states she needs medications refilled.)    Pt is a 88 y.o. female who presents for check up for Drug-induced immunodeficiency  (primary encounter diagnosis)  Ulcerative pancolitis with rectal bleeding  Atrial flutter, unspecified type. Doing well on current meds. Denies any side effects. Prevention is not up to date.     Review of Systems   Gastrointestinal:         BM qod    Genitourinary:         Nocturia x 1     Objective:      Physical Exam  Constitutional:       Appearance: She is well-developed.      Comments: In a W/C   HENT:      Head: Normocephalic.   Eyes:      Pupils: Pupils are equal, round, and reactive to light.   Neck:      Thyroid: No thyromegaly.   Cardiovascular:      Rate and Rhythm: Normal rate and regular rhythm.   Pulmonary:      Effort: No respiratory distress.      Breath sounds: No wheezing or rales.   Chest:      Chest wall: No tenderness.   Abdominal:      General: There is no distension.      Tenderness: There is no abdominal tenderness. There is no guarding or rebound.   Musculoskeletal:         General: No tenderness. Normal range of motion.      Cervical back: Normal range of motion and neck supple.   Lymphadenopathy:      Cervical: No cervical adenopathy.   Skin:     General: Skin is warm and dry.      Coloration: Skin is not pale.      Findings: No rash.   Neurological:      Mental Status: She is alert and oriented to person, place, and time.      Cranial Nerves: No cranial nerve deficit.      Motor: No abnormal muscle tone.      Coordination: Coordination normal.      Deep Tendon Reflexes: Reflexes are normal and symmetric. Reflexes normal.   Psychiatric:         Thought Content: Thought content normal.         Judgment: Judgment normal.       Assessment:       Encounter Diagnoses   Name Primary?    Drug-induced immunodeficiency Yes    Ulcerative pancolitis with rectal  bleeding     Atrial flutter, unspecified type          Plan:   1. Drug-induced immunodeficiency    2. Ulcerative pancolitis with rectal bleeding    3. Atrial flutter, unspecified type

## 2023-05-31 ENCOUNTER — EXTERNAL CHRONIC CARE MANAGEMENT (OUTPATIENT)
Dept: PRIMARY CARE CLINIC | Facility: CLINIC | Age: 88
End: 2023-05-31
Payer: MEDICARE

## 2023-05-31 PROCEDURE — 99999 PR STA SHADOW: ICD-10-PCS | Mod: PBBFAC,,, | Performed by: FAMILY MEDICINE

## 2023-05-31 PROCEDURE — 99439 CHRNC CARE MGMT STAF EA ADDL: CPT | Mod: PBBFAC,27 | Performed by: FAMILY MEDICINE

## 2023-05-31 PROCEDURE — 99490 CHRNC CARE MGMT STAFF 1ST 20: CPT | Mod: PBBFAC,25 | Performed by: FAMILY MEDICINE

## 2023-05-31 PROCEDURE — 99999 PR STA SHADOW: CPT | Mod: PBBFAC,,, | Performed by: FAMILY MEDICINE

## 2023-06-30 ENCOUNTER — EXTERNAL CHRONIC CARE MANAGEMENT (OUTPATIENT)
Dept: PRIMARY CARE CLINIC | Facility: CLINIC | Age: 88
End: 2023-06-30
Payer: MEDICARE

## 2023-06-30 PROCEDURE — 99999 PR STA SHADOW: ICD-10-PCS | Mod: PBBFAC,,, | Performed by: FAMILY MEDICINE

## 2023-06-30 PROCEDURE — 99439 CHRNC CARE MGMT STAF EA ADDL: CPT | Mod: S$PBB | Performed by: FAMILY MEDICINE

## 2023-06-30 PROCEDURE — 99999 PR STA SHADOW: CPT | Mod: PBBFAC,,, | Performed by: FAMILY MEDICINE

## 2023-06-30 PROCEDURE — 99490 CHRNC CARE MGMT STAFF 1ST 20: CPT | Mod: PBBFAC | Performed by: FAMILY MEDICINE

## 2023-07-31 ENCOUNTER — EXTERNAL CHRONIC CARE MANAGEMENT (OUTPATIENT)
Dept: PRIMARY CARE CLINIC | Facility: CLINIC | Age: 88
End: 2023-07-31
Payer: MEDICARE

## 2023-07-31 PROCEDURE — 99490 CHRNC CARE MGMT STAFF 1ST 20: CPT | Mod: S$PBB | Performed by: FAMILY MEDICINE

## 2023-07-31 PROCEDURE — 99999 PR STA SHADOW: CPT | Mod: PBBFAC,,, | Performed by: FAMILY MEDICINE

## 2023-07-31 PROCEDURE — 99999 PR STA SHADOW: ICD-10-PCS | Mod: PBBFAC,,, | Performed by: FAMILY MEDICINE

## 2023-08-16 ENCOUNTER — PES CALL (OUTPATIENT)
Dept: ADMINISTRATIVE | Facility: CLINIC | Age: 88
End: 2023-08-16
Payer: MEDICARE

## 2023-08-31 ENCOUNTER — EXTERNAL CHRONIC CARE MANAGEMENT (OUTPATIENT)
Dept: PRIMARY CARE CLINIC | Facility: CLINIC | Age: 88
End: 2023-08-31
Payer: MEDICARE

## 2023-08-31 PROCEDURE — 99999 PR STA SHADOW: CPT | Mod: PBBFAC,,, | Performed by: FAMILY MEDICINE

## 2023-08-31 PROCEDURE — 99999 PR STA SHADOW: ICD-10-PCS | Mod: PBBFAC,,, | Performed by: FAMILY MEDICINE

## 2023-08-31 PROCEDURE — 99490 CHRNC CARE MGMT STAFF 1ST 20: CPT | Mod: PBBFAC | Performed by: FAMILY MEDICINE

## 2023-09-30 ENCOUNTER — EXTERNAL CHRONIC CARE MANAGEMENT (OUTPATIENT)
Dept: PRIMARY CARE CLINIC | Facility: CLINIC | Age: 88
End: 2023-09-30
Payer: MEDICARE

## 2023-09-30 PROCEDURE — 99490 CHRNC CARE MGMT STAFF 1ST 20: CPT | Mod: PBBFAC | Performed by: FAMILY MEDICINE

## 2023-09-30 PROCEDURE — 99999 PR STA SHADOW: CPT | Mod: PBBFAC,,, | Performed by: FAMILY MEDICINE

## 2023-09-30 PROCEDURE — 99999 PR STA SHADOW: ICD-10-PCS | Mod: PBBFAC,,, | Performed by: FAMILY MEDICINE

## 2023-10-31 ENCOUNTER — EXTERNAL CHRONIC CARE MANAGEMENT (OUTPATIENT)
Dept: PRIMARY CARE CLINIC | Facility: CLINIC | Age: 88
End: 2023-10-31
Payer: MEDICARE

## 2023-10-31 PROCEDURE — 99999 PR STA SHADOW: ICD-10-PCS | Mod: PBBFAC,,, | Performed by: FAMILY MEDICINE

## 2023-10-31 PROCEDURE — 99490 CHRNC CARE MGMT STAFF 1ST 20: CPT | Mod: PBBFAC | Performed by: FAMILY MEDICINE

## 2023-10-31 PROCEDURE — 99999 PR STA SHADOW: CPT | Mod: PBBFAC,,, | Performed by: FAMILY MEDICINE

## 2023-11-30 ENCOUNTER — EXTERNAL CHRONIC CARE MANAGEMENT (OUTPATIENT)
Dept: PRIMARY CARE CLINIC | Facility: CLINIC | Age: 88
End: 2023-11-30
Payer: MEDICARE

## 2023-11-30 PROCEDURE — 99999 PR STA SHADOW: ICD-10-PCS | Mod: PBBFAC,,, | Performed by: FAMILY MEDICINE

## 2023-11-30 PROCEDURE — 99490 CHRNC CARE MGMT STAFF 1ST 20: CPT | Mod: PBBFAC | Performed by: FAMILY MEDICINE

## 2023-11-30 PROCEDURE — 99999 PR STA SHADOW: CPT | Mod: PBBFAC,,, | Performed by: FAMILY MEDICINE

## 2023-12-31 ENCOUNTER — EXTERNAL CHRONIC CARE MANAGEMENT (OUTPATIENT)
Dept: PRIMARY CARE CLINIC | Facility: CLINIC | Age: 88
End: 2023-12-31
Payer: MEDICARE

## 2023-12-31 PROCEDURE — 99999 PR STA SHADOW: CPT | Mod: PBBFAC,,, | Performed by: FAMILY MEDICINE

## 2023-12-31 PROCEDURE — 99490 CHRNC CARE MGMT STAFF 1ST 20: CPT | Mod: PBBFAC | Performed by: FAMILY MEDICINE

## 2024-01-11 DIAGNOSIS — Z00.00 ENCOUNTER FOR MEDICARE ANNUAL WELLNESS EXAM: ICD-10-CM

## 2024-01-31 ENCOUNTER — EXTERNAL CHRONIC CARE MANAGEMENT (OUTPATIENT)
Dept: PRIMARY CARE CLINIC | Facility: CLINIC | Age: 89
End: 2024-01-31
Payer: MEDICARE

## 2024-01-31 PROCEDURE — 99999 PR STA SHADOW: CPT | Mod: PBBFAC,,, | Performed by: FAMILY MEDICINE

## 2024-01-31 PROCEDURE — 99490 CHRNC CARE MGMT STAFF 1ST 20: CPT | Mod: S$PBB | Performed by: FAMILY MEDICINE

## 2024-02-29 ENCOUNTER — EXTERNAL CHRONIC CARE MANAGEMENT (OUTPATIENT)
Dept: PRIMARY CARE CLINIC | Facility: CLINIC | Age: 89
End: 2024-02-29
Payer: MEDICARE

## 2024-02-29 PROCEDURE — 99490 CHRNC CARE MGMT STAFF 1ST 20: CPT | Mod: S$PBB | Performed by: FAMILY MEDICINE

## 2024-02-29 PROCEDURE — 99999 PR STA SHADOW: CPT | Mod: PBBFAC,,, | Performed by: FAMILY MEDICINE

## 2024-03-31 ENCOUNTER — EXTERNAL CHRONIC CARE MANAGEMENT (OUTPATIENT)
Dept: PRIMARY CARE CLINIC | Facility: CLINIC | Age: 89
End: 2024-03-31
Payer: MEDICARE

## 2024-03-31 PROCEDURE — 99490 CHRNC CARE MGMT STAFF 1ST 20: CPT | Mod: PBBFAC | Performed by: FAMILY MEDICINE

## 2024-03-31 PROCEDURE — 99999 PR STA SHADOW: CPT | Mod: PBBFAC,,, | Performed by: FAMILY MEDICINE

## 2024-04-30 ENCOUNTER — EXTERNAL CHRONIC CARE MANAGEMENT (OUTPATIENT)
Dept: PRIMARY CARE CLINIC | Facility: CLINIC | Age: 89
End: 2024-04-30
Payer: MEDICARE

## 2024-04-30 PROCEDURE — 99490 CHRNC CARE MGMT STAFF 1ST 20: CPT | Mod: S$PBB | Performed by: FAMILY MEDICINE

## 2024-04-30 PROCEDURE — 99999 PR STA SHADOW: CPT | Mod: PBBFAC,,, | Performed by: FAMILY MEDICINE

## 2024-05-31 ENCOUNTER — EXTERNAL CHRONIC CARE MANAGEMENT (OUTPATIENT)
Dept: PRIMARY CARE CLINIC | Facility: CLINIC | Age: 89
End: 2024-05-31
Payer: MEDICARE

## 2024-05-31 PROCEDURE — 99490 CHRNC CARE MGMT STAFF 1ST 20: CPT | Mod: S$PBB | Performed by: FAMILY MEDICINE

## 2024-05-31 PROCEDURE — 99999 PR STA SHADOW: CPT | Mod: PBBFAC,,, | Performed by: FAMILY MEDICINE

## 2024-06-30 ENCOUNTER — EXTERNAL CHRONIC CARE MANAGEMENT (OUTPATIENT)
Dept: PRIMARY CARE CLINIC | Facility: CLINIC | Age: 89
End: 2024-06-30
Payer: MEDICARE

## 2024-06-30 PROCEDURE — 99439 CHRNC CARE MGMT STAF EA ADDL: CPT | Mod: S$PBB | Performed by: FAMILY MEDICINE

## 2024-06-30 PROCEDURE — 99999 PR STA SHADOW: CPT | Mod: PBBFAC,,, | Performed by: FAMILY MEDICINE

## 2024-06-30 PROCEDURE — 99490 CHRNC CARE MGMT STAFF 1ST 20: CPT | Mod: S$PBB | Performed by: FAMILY MEDICINE

## 2024-07-31 ENCOUNTER — EXTERNAL CHRONIC CARE MANAGEMENT (OUTPATIENT)
Dept: PRIMARY CARE CLINIC | Facility: CLINIC | Age: 89
End: 2024-07-31
Payer: MEDICARE

## 2024-07-31 PROCEDURE — 99999 PR STA SHADOW: CPT | Mod: PBBFAC,,, | Performed by: FAMILY MEDICINE

## 2024-07-31 PROCEDURE — 99490 CHRNC CARE MGMT STAFF 1ST 20: CPT | Mod: PBBFAC | Performed by: FAMILY MEDICINE

## 2024-08-26 ENCOUNTER — TELEPHONE (OUTPATIENT)
Dept: FAMILY MEDICINE | Facility: CLINIC | Age: 89
End: 2024-08-26
Payer: MEDICARE

## 2024-08-26 NOTE — TELEPHONE ENCOUNTER
Patient's daughter called asking about an appointment for the patient.    Patient has an unhealed wound located on her forehead.     Patient is scheduled for Thursday 08/29 at 1:30 in [place].    Patient's daughter confirmed and v/u.

## 2024-08-26 NOTE — TELEPHONE ENCOUNTER
----- Message from Lee Tijerina sent at 2024  1:07 PM CDT -----  Contact: Daughter - Sandra Rivera  MRN: [id number]  : 1934  PCP: Ethan Denton  Home Phone      [phone]  Work Phone      Not on file.  [employer]          [phone]      MESSAGE: fell approx 2 months ago -- has non-healing wound on forehead -- requesting appt with Dr Denton, sooner than next available (Oct)    Call Sandra @ [phone]    PCP: Bertin

## 2024-08-31 ENCOUNTER — EXTERNAL CHRONIC CARE MANAGEMENT (OUTPATIENT)
Dept: PRIMARY CARE CLINIC | Facility: CLINIC | Age: 89
End: 2024-08-31
Payer: MEDICARE

## 2024-08-31 PROCEDURE — 99490 CHRNC CARE MGMT STAFF 1ST 20: CPT | Mod: PBBFAC | Performed by: FAMILY MEDICINE

## 2024-08-31 PROCEDURE — 99999 PR STA SHADOW: CPT | Mod: PBBFAC,,, | Performed by: FAMILY MEDICINE

## 2024-09-03 DIAGNOSIS — I48.92 ATRIAL FLUTTER, UNSPECIFIED TYPE: ICD-10-CM

## 2024-09-03 RX ORDER — RIVAROXABAN 20 MG/1
20 TABLET, FILM COATED ORAL
Qty: 90 TABLET | Refills: 3 | OUTPATIENT
Start: 2024-09-03

## 2024-09-03 NOTE — TELEPHONE ENCOUNTER
LOV: 05/04/2023    Patient requesting refill of: rivaroxaban (XARELTO) 20 mg Tab         Patient needs appointment

## 2024-09-16 DIAGNOSIS — I48.92 ATRIAL FLUTTER, UNSPECIFIED TYPE: ICD-10-CM

## 2024-09-16 RX ORDER — RIVAROXABAN 20 MG/1
TABLET, FILM COATED ORAL
Qty: 90 TABLET | Refills: 3 | Status: SHIPPED | OUTPATIENT
Start: 2024-09-16

## 2024-09-16 NOTE — TELEPHONE ENCOUNTER
LOV: 05/04/2024    Patient requesting refill of: rivaroxaban (XARELTO) 20 mg Tab     Medication pended    Please advise

## 2024-09-19 ENCOUNTER — OFFICE VISIT (OUTPATIENT)
Dept: INTERNAL MEDICINE | Facility: CLINIC | Age: 89
End: 2024-09-19
Payer: MEDICARE

## 2024-09-19 VITALS
RESPIRATION RATE: 16 BRPM | WEIGHT: 121.69 LBS | SYSTOLIC BLOOD PRESSURE: 126 MMHG | BODY MASS INDEX: 21.56 KG/M2 | DIASTOLIC BLOOD PRESSURE: 72 MMHG | OXYGEN SATURATION: 97 % | HEIGHT: 63 IN | HEART RATE: 86 BPM

## 2024-09-19 DIAGNOSIS — B96.89 LOCALIZED BACTERIAL SKIN INFECTION: Primary | ICD-10-CM

## 2024-09-19 DIAGNOSIS — L08.9 LOCALIZED BACTERIAL SKIN INFECTION: Primary | ICD-10-CM

## 2024-09-19 PROCEDURE — 99213 OFFICE O/P EST LOW 20 MIN: CPT | Mod: PBBFAC,PN | Performed by: FAMILY MEDICINE

## 2024-09-19 PROCEDURE — 99999 PR PBB SHADOW E&M-EST. PATIENT-LVL III: CPT | Mod: PBBFAC,,, | Performed by: FAMILY MEDICINE

## 2024-09-19 PROCEDURE — 99213 OFFICE O/P EST LOW 20 MIN: CPT | Mod: S$PBB,,, | Performed by: FAMILY MEDICINE

## 2024-09-19 RX ORDER — CIPROFLOXACIN 500 MG/1
500 TABLET ORAL 2 TIMES DAILY
Qty: 28 TABLET | Refills: 0 | Status: SHIPPED | OUTPATIENT
Start: 2024-09-19 | End: 2024-09-29

## 2024-09-19 RX ORDER — MUPIROCIN 20 MG/G
OINTMENT TOPICAL 2 TIMES DAILY
Qty: 22 G | Refills: 2 | Status: SHIPPED | OUTPATIENT
Start: 2024-09-19 | End: 2024-09-29

## 2024-09-19 NOTE — PROGRESS NOTES
Subjective:       Patient ID: Katarina Rivera is a 89 y.o. female.    Chief Complaint: Head Injury (Patient fell about 4 weeks ago and has an injury to her forehead that is not healing.)    Forehead wound for > one month after hitting the floor    Head Injury     Review of Systems   Skin:  Positive for wound.        L forehead with 6cm x 7cm excess crust       Objective:      Physical Exam  Constitutional:       Appearance: She is well-developed.   HENT:      Head: Normocephalic.   Eyes:      Pupils: Pupils are equal, round, and reactive to light.   Neck:      Thyroid: No thyromegaly.   Cardiovascular:      Rate and Rhythm: Normal rate and regular rhythm.   Pulmonary:      Effort: No respiratory distress.      Breath sounds: No wheezing or rales.   Chest:      Chest wall: No tenderness.   Abdominal:      General: There is no distension.      Tenderness: There is no abdominal tenderness. There is no guarding or rebound.   Musculoskeletal:         General: No tenderness. Normal range of motion.      Cervical back: Normal range of motion and neck supple.   Lymphadenopathy:      Cervical: No cervical adenopathy.   Skin:     General: Skin is warm and dry.      Coloration: Skin is not pale.      Findings: Lesion present. No rash.      Comments: 6x7 cm thick crust w serous drainage   Neurological:      Mental Status: She is alert and oriented to person, place, and time.      Cranial Nerves: No cranial nerve deficit.      Motor: No abnormal muscle tone.      Coordination: Coordination normal.      Deep Tendon Reflexes: Reflexes are normal and symmetric. Reflexes normal.   Psychiatric:         Thought Content: Thought content normal.         Judgment: Judgment normal.         Assessment:       Encounter Diagnosis   Name Primary?    Localized bacterial skin infection Yes         Plan:   1. Localized bacterial skin infection    Other orders  -     ciprofloxacin HCl (CIPRO) 500 MG tablet; Take 1 tablet (500 mg total) by mouth 2  (two) times daily. for 10 days  Dispense: 28 tablet; Refill: 0  -     mupirocin (BACTROBAN) 2 % ointment; Apply topically 2 (two) times daily. for 10 days  Dispense: 22 g; Refill: 2

## 2024-09-30 ENCOUNTER — EXTERNAL CHRONIC CARE MANAGEMENT (OUTPATIENT)
Dept: PRIMARY CARE CLINIC | Facility: CLINIC | Age: 89
End: 2024-09-30
Payer: MEDICARE

## 2024-09-30 PROCEDURE — 99490 CHRNC CARE MGMT STAFF 1ST 20: CPT | Mod: PBBFAC | Performed by: FAMILY MEDICINE

## 2024-09-30 PROCEDURE — 99999 PR STA SHADOW: CPT | Mod: PBBFAC,,, | Performed by: FAMILY MEDICINE

## 2024-10-31 ENCOUNTER — EXTERNAL CHRONIC CARE MANAGEMENT (OUTPATIENT)
Dept: PRIMARY CARE CLINIC | Facility: CLINIC | Age: 89
End: 2024-10-31
Payer: MEDICARE

## 2024-10-31 PROCEDURE — 99490 CHRNC CARE MGMT STAFF 1ST 20: CPT | Mod: PBBFAC | Performed by: FAMILY MEDICINE

## 2024-10-31 PROCEDURE — 99999 PR STA SHADOW: CPT | Mod: PBBFAC,,, | Performed by: FAMILY MEDICINE

## 2024-11-11 ENCOUNTER — TELEPHONE (OUTPATIENT)
Dept: SURGERY | Facility: CLINIC | Age: 89
End: 2024-11-11
Payer: MEDICARE

## 2024-11-11 ENCOUNTER — HOSPITAL ENCOUNTER (EMERGENCY)
Facility: HOSPITAL | Age: 89
Discharge: HOME | End: 2024-11-11
Attending: STUDENT IN AN ORGANIZED HEALTH CARE EDUCATION/TRAINING PROGRAM
Payer: MEDICARE

## 2024-11-11 VITALS
DIASTOLIC BLOOD PRESSURE: 69 MMHG | TEMPERATURE: 99 F | WEIGHT: 105 LBS | HEIGHT: 66 IN | HEART RATE: 104 BPM | SYSTOLIC BLOOD PRESSURE: 154 MMHG | RESPIRATION RATE: 20 BRPM | OXYGEN SATURATION: 97 % | BODY MASS INDEX: 16.88 KG/M2

## 2024-11-11 DIAGNOSIS — K63.89 COLONIC MASS: Primary | ICD-10-CM

## 2024-11-11 DIAGNOSIS — R10.32 LLQ PAIN: ICD-10-CM

## 2024-11-11 LAB
ALBUMIN SERPL BCP-MCNC: 2.8 G/DL (ref 3.5–5.2)
ALP SERPL-CCNC: 87 U/L (ref 40–150)
ALT SERPL W/O P-5'-P-CCNC: 7 U/L (ref 10–44)
ANION GAP SERPL CALC-SCNC: 13 MMOL/L (ref 8–16)
AST SERPL-CCNC: 10 U/L (ref 10–40)
BACTERIA #/AREA URNS HPF: ABNORMAL /HPF
BASOPHILS # BLD AUTO: 0.08 K/UL (ref 0–0.2)
BASOPHILS NFR BLD: 0.7 % (ref 0–1.9)
BILIRUB SERPL-MCNC: 0.5 MG/DL (ref 0.1–1)
BILIRUB UR QL STRIP: NEGATIVE
BUN SERPL-MCNC: 18 MG/DL (ref 8–23)
CALCIUM SERPL-MCNC: 8.6 MG/DL (ref 8.7–10.5)
CHLORIDE SERPL-SCNC: 105 MMOL/L (ref 95–110)
CLARITY UR: ABNORMAL
CO2 SERPL-SCNC: 22 MMOL/L (ref 23–29)
COLOR UR: YELLOW
CREAT SERPL-MCNC: 1.2 MG/DL (ref 0.5–1.4)
DIFFERENTIAL METHOD BLD: ABNORMAL
EOSINOPHIL # BLD AUTO: 0.1 K/UL (ref 0–0.5)
EOSINOPHIL NFR BLD: 0.8 % (ref 0–8)
ERYTHROCYTE [DISTWIDTH] IN BLOOD BY AUTOMATED COUNT: 15.3 % (ref 11.5–14.5)
EST. GFR  (NO RACE VARIABLE): 43 ML/MIN/1.73 M^2
GLUCOSE SERPL-MCNC: 96 MG/DL (ref 70–110)
GLUCOSE UR QL STRIP: NEGATIVE
HCT VFR BLD AUTO: 36.9 % (ref 37–48.5)
HGB BLD-MCNC: 11.5 G/DL (ref 12–16)
HGB UR QL STRIP: ABNORMAL
HYALINE CASTS #/AREA URNS LPF: 2 /LPF
IMM GRANULOCYTES # BLD AUTO: 0.05 K/UL (ref 0–0.04)
IMM GRANULOCYTES NFR BLD AUTO: 0.4 % (ref 0–0.5)
KETONES UR QL STRIP: ABNORMAL
LACTATE SERPL-SCNC: 1.5 MMOL/L (ref 0.5–2.2)
LEUKOCYTE ESTERASE UR QL STRIP: ABNORMAL
LIPASE SERPL-CCNC: 3 U/L (ref 4–60)
LYMPHOCYTES # BLD AUTO: 1.2 K/UL (ref 1–4.8)
LYMPHOCYTES NFR BLD: 10.4 % (ref 18–48)
MCH RBC QN AUTO: 25.7 PG (ref 27–31)
MCHC RBC AUTO-ENTMCNC: 31.2 G/DL (ref 32–36)
MCV RBC AUTO: 83 FL (ref 82–98)
MICROSCOPIC COMMENT: ABNORMAL
MONOCYTES # BLD AUTO: 0.8 K/UL (ref 0.3–1)
MONOCYTES NFR BLD: 6.4 % (ref 4–15)
NEUTROPHILS # BLD AUTO: 9.7 K/UL (ref 1.8–7.7)
NEUTROPHILS NFR BLD: 81.3 % (ref 38–73)
NITRITE UR QL STRIP: POSITIVE
NRBC BLD-RTO: 0 /100 WBC
PH UR STRIP: 6 [PH] (ref 5–8)
PLATELET # BLD AUTO: 401 K/UL (ref 150–450)
PMV BLD AUTO: 9.2 FL (ref 9.2–12.9)
POTASSIUM SERPL-SCNC: 3.6 MMOL/L (ref 3.5–5.1)
PROT SERPL-MCNC: 6.8 G/DL (ref 6–8.4)
PROT UR QL STRIP: ABNORMAL
RBC # BLD AUTO: 4.47 M/UL (ref 4–5.4)
RBC #/AREA URNS HPF: 3 /HPF (ref 0–4)
SODIUM SERPL-SCNC: 140 MMOL/L (ref 136–145)
SP GR UR STRIP: 1.02 (ref 1–1.03)
URN SPEC COLLECT METH UR: ABNORMAL
UROBILINOGEN UR STRIP-ACNC: NEGATIVE EU/DL
WBC # BLD AUTO: 11.94 K/UL (ref 3.9–12.7)
WBC #/AREA URNS HPF: 20 /HPF (ref 0–5)
WBC CLUMPS URNS QL MICRO: ABNORMAL

## 2024-11-11 PROCEDURE — 96374 THER/PROPH/DIAG INJ IV PUSH: CPT

## 2024-11-11 PROCEDURE — 63600175 PHARM REV CODE 636 W HCPCS: Performed by: STUDENT IN AN ORGANIZED HEALTH CARE EDUCATION/TRAINING PROGRAM

## 2024-11-11 PROCEDURE — 85025 COMPLETE CBC W/AUTO DIFF WBC: CPT | Performed by: STUDENT IN AN ORGANIZED HEALTH CARE EDUCATION/TRAINING PROGRAM

## 2024-11-11 PROCEDURE — 99285 EMERGENCY DEPT VISIT HI MDM: CPT | Mod: 25

## 2024-11-11 PROCEDURE — 25500020 PHARM REV CODE 255: Performed by: STUDENT IN AN ORGANIZED HEALTH CARE EDUCATION/TRAINING PROGRAM

## 2024-11-11 PROCEDURE — 87086 URINE CULTURE/COLONY COUNT: CPT | Performed by: STUDENT IN AN ORGANIZED HEALTH CARE EDUCATION/TRAINING PROGRAM

## 2024-11-11 PROCEDURE — 81000 URINALYSIS NONAUTO W/SCOPE: CPT | Performed by: STUDENT IN AN ORGANIZED HEALTH CARE EDUCATION/TRAINING PROGRAM

## 2024-11-11 PROCEDURE — 87186 SC STD MICRODIL/AGAR DIL: CPT | Performed by: STUDENT IN AN ORGANIZED HEALTH CARE EDUCATION/TRAINING PROGRAM

## 2024-11-11 PROCEDURE — 83690 ASSAY OF LIPASE: CPT | Performed by: STUDENT IN AN ORGANIZED HEALTH CARE EDUCATION/TRAINING PROGRAM

## 2024-11-11 PROCEDURE — 80053 COMPREHEN METABOLIC PANEL: CPT | Performed by: STUDENT IN AN ORGANIZED HEALTH CARE EDUCATION/TRAINING PROGRAM

## 2024-11-11 PROCEDURE — 83605 ASSAY OF LACTIC ACID: CPT | Performed by: STUDENT IN AN ORGANIZED HEALTH CARE EDUCATION/TRAINING PROGRAM

## 2024-11-11 PROCEDURE — 87088 URINE BACTERIA CULTURE: CPT | Performed by: STUDENT IN AN ORGANIZED HEALTH CARE EDUCATION/TRAINING PROGRAM

## 2024-11-11 RX ORDER — DEXTROMETHORPHAN HYDROBROMIDE, GUAIFENESIN 5; 100 MG/5ML; MG/5ML
650 LIQUID ORAL EVERY 8 HOURS
Qty: 21 TABLET | Refills: 0 | Status: SHIPPED | OUTPATIENT
Start: 2024-11-11

## 2024-11-11 RX ORDER — CEFDINIR 300 MG/1
300 CAPSULE ORAL DAILY
Qty: 10 CAPSULE | Refills: 0 | Status: SHIPPED | OUTPATIENT
Start: 2024-11-11 | End: 2024-11-21

## 2024-11-11 RX ORDER — CEFTRIAXONE 2 G/1
2 INJECTION, POWDER, FOR SOLUTION INTRAMUSCULAR; INTRAVENOUS
Status: COMPLETED | OUTPATIENT
Start: 2024-11-11 | End: 2024-11-11

## 2024-11-11 RX ADMIN — CEFTRIAXONE SODIUM 2 G: 2 INJECTION, POWDER, FOR SOLUTION INTRAMUSCULAR; INTRAVENOUS at 08:11

## 2024-11-11 RX ADMIN — IOHEXOL 100 ML: 350 INJECTION, SOLUTION INTRAVENOUS at 08:11

## 2024-11-11 NOTE — ED PROVIDER NOTES
Encounter Date: 11/11/2024       History     Chief Complaint   Patient presents with    Abdominal Pain     89-year-old female with history of hypertension, essential tremor, ulcerative colitis, presenting with left lower quadrant pain that began yesterday.  Patient denies fever, nausea, vomiting, diarrhea.  No chest pain or shortness breath.  Denies dysuria or back pain.  Reports the pain is quite severe.      Review of patient's allergies indicates:  No Known Allergies  Past Medical History:   Diagnosis Date    Anemia     Glaucoma (increased eye pressure)     H/O blood clots 2019    HTN (hypertension)     Hyperchloremia     Tremor, essential     Ulcerative colitis confined to rectum      Past Surgical History:   Procedure Laterality Date    APPENDECTOMY      COLONOSCOPY      COLONOSCOPY N/A 11/10/2015    Procedure: COLONOSCOPY;  Surgeon: Michael San MD;  Location: [facility] ENDO ([address] [place] [address]);  Service: Endoscopy;  Laterality: N/A;    COLONOSCOPY N/A 11/9/2020    Procedure: COLONOSCOPY;  Surgeon: Lyle Wilburn MD;  Location: [facility];  Service: Endoscopy;  Laterality: N/A;    hemorrhoidectomy      HYSTERECTOMY      OOPHORECTOMY      TOTAL KNEE ARTHROPLASTY      left      Family History   Problem Relation Name Age of Onset    Stroke Mother      Blindness Father       Social History     Tobacco Use    Smoking status: Never     Passive exposure: Never    Smokeless tobacco: Never   Substance Use Topics    Alcohol use: No    Drug use: No     Review of Systems   Constitutional:  Negative for fever.   HENT:  Negative for sore throat.    Respiratory:  Negative for shortness of breath.    Cardiovascular:  Negative for chest pain.   Gastrointestinal:  Positive for abdominal pain. Negative for diarrhea, nausea and vomiting.   Genitourinary:  Negative for dysuria.   Musculoskeletal:  Negative for back pain.   Skin:  Negative for rash.   Neurological:  Negative for weakness.   Hematological:  Does not bruise/bleed easily.        Physical Exam     Initial Vitals [11/11/24 0730]   BP Pulse Resp Temp SpO2   132/67 99 20 99.2 °F (37.3 °C) 98 %      MAP       --         Physical Exam    Nursing note and vitals reviewed.  Constitutional: She appears well-developed.   HENT:   Head: Normocephalic.   Eyes: Pupils are equal, round, and reactive to light.   Neck:   Normal range of motion.  Cardiovascular:            No murmur heard.  Pulmonary/Chest: No respiratory distress.   Abdominal: Abdomen is soft.   Very tender to left lower quadrant.  No rebound or guarding.  No CVA tenderness bilaterally.   Musculoskeletal:         General: No edema.      Cervical back: Normal range of motion.     Neurological: She is alert.   Skin: Skin is warm.   Psychiatric: She has a normal mood and affect.         ED Course   Procedures  Labs Reviewed   CBC W/ AUTO DIFFERENTIAL - Abnormal       Result Value    WBC 11.94      RBC 4.47      Hemoglobin 11.5 (*)     Hematocrit 36.9 (*)     MCV 83      MCH 25.7 (*)     MCHC 31.2 (*)     RDW 15.3 (*)     Platelets 401      MPV 9.2      Immature Granulocytes 0.4      Gran # (ANC) 9.7 (*)     Immature Grans (Abs) 0.05 (*)     Lymph # 1.2      Mono # 0.8      Eos # 0.1      Baso # 0.08      nRBC 0      Gran % 81.3 (*)     Lymph % 10.4 (*)     Mono % 6.4      Eosinophil % 0.8      Basophil % 0.7      Differential Method Automated     COMPREHENSIVE METABOLIC PANEL - Abnormal    Sodium 140      Potassium 3.6      Chloride 105      CO2 22 (*)     Glucose 96      BUN 18      Creatinine 1.2      Calcium 8.6 (*)     Total Protein 6.8      Albumin 2.8 (*)     Total Bilirubin 0.5      Alkaline Phosphatase 87      AST 10      ALT 7 (*)     eGFR 43 (*)     Anion Gap 13     LIPASE - Abnormal    Lipase 3 (*)    URINALYSIS, REFLEX TO URINE CULTURE - Abnormal    Specimen UA Urine, Catheterized      Color, UA Yellow      Appearance, UA Cloudy (*)     pH, UA 6.0      Specific Gravity, UA 1.025      Protein, UA 1+ (*)     Glucose, UA  Negative      Ketones, UA 2+ (*)     Bilirubin (UA) Negative      Occult Blood UA 1+ (*)     Nitrite, UA Positive (*)     Urobilinogen, UA Negative      Leukocytes, UA 1+ (*)     Narrative:     Specimen Source->Urine   URINALYSIS MICROSCOPIC - Abnormal    RBC, UA 3      WBC, UA 20 (*)     WBC Clumps, UA Few (*)     Bacteria Many (*)     Hyaline Casts, UA 2 (*)     Microscopic Comment SEE COMMENT      Narrative:     Specimen Source->Urine   CULTURE, URINE   LACTIC ACID, PLASMA    Lactate (Lactic Acid) 1.5            Imaging Results              CT Abdomen Pelvis With IV Contrast NO Oral Contrast (Final result)  Result time 11/11/24 10:15:42      Final result by Star Sanchez MD (11/11/24 10:15:42)                   Impression:      A suspected mass in the proximal descending colon.  Colonic wall thickening involving the splenic flexure and descending colon with adjacent fat stranding, consistent with colitis.  Small amount of pericolonic fluid.  No free air or abscess.      Electronically signed by: Star Sanchez MD  Date:    11/11/2024  Time:    10:15               Narrative:    EXAMINATION:  CT ABDOMEN PELVIS WITH IV CONTRAST    CLINICAL HISTORY:  LLQ abdominal pain;    TECHNIQUE:  Axial CT images were obtained. Iterative reconstruction technique was used. CT/cardiac nuclear exam/s in prior 12 months: 0.    COMPARISON:  CT abdomen pelvis with IV contrast 11/07/2020.    FINDINGS:  No hepatic abnormality.  No gallstones.  The spleen, pancreas, adrenal glands and kidneys demonstrate no abnormality.  There is no gross gastric abnormality.  There is thickening of the splenic flexure of the colon extending into the proximal descending colon with a suspected intraluminal mass (images  of series 2 axial).  There is pericolonic fat stranding with a small amount of pericolonic free fluid.  There is no abscess.  The appendix is normal.  No abnormality of urinary bladder.  Hysterectomy.  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  No aneurysm.  No lymph node enlargement.  The visualized lung bases are clear.  No osseous abnormality.                                       Medications   cefTRIAXone injection 2 g (2 g Intravenous Given 11/11/24 0853)   iohexoL (OMNIPAQUE 350) injection 100 mL (100 mLs Intravenous Given 11/11/24 0837)     Medical Decision Making  DDX: Diverticulitis vs. kidney stone vs. less likely atypical appendicitis. Otherwise likely gastroenteritis/nonspecific abdominal pain.  DX: BMP, CBC, LFT, Lipase. CTAP. UA/Udip.  TX: Analgesia, antiemetic PRN. IVF if vomiting. Treatment/consult as indicated by studies.  Dispo: Pending studies. If studies WNL, symptoms controlled, or findings stable for outpatient management, discharge to follow up with PMD +/- specialist within 3 days with supportive care recommendations and strict precautions for return.        Amount and/or Complexity of Data Reviewed  Labs: ordered.  Radiology: ordered.               ED Course as of 11/11/24 1020   Mon Nov 11, 2024   1020 I discussed patient's CT findings with patient and family.  They understand that it was very important that they follow up with Gastroenterology for a colonoscopy and further evaluation of the mass.  There was no signs of obstruction on patient's CT scan.  There was a mild colitis, will treat with antibiotics.  Will so treating patient's urinary tract infection. [NB]      ED Course User Index  [NB] West Mixon MD                           Clinical Impression:  Final diagnoses:  [R10.32] LLQ pain  [K63.89] Colonic mass (Primary)                 West Mixon MD  11/11/24 0733       West Mixon MD  11/11/24 1020

## 2024-11-11 NOTE — TELEPHONE ENCOUNTER
Informed pt's daughter,Constance, that pt has an appt w/Dr Della Boyd @ 2:20pm at [facility].

## 2024-11-11 NOTE — ED TRIAGE NOTES
Patient to ED per [facility] with c/o left lower quadrant pain since last night. Denies vomiting and diarrhea.

## 2024-11-12 ENCOUNTER — TELEPHONE (OUTPATIENT)
Dept: FAMILY MEDICINE | Facility: CLINIC | Age: 89
End: 2024-11-12
Payer: MEDICARE

## 2024-11-12 NOTE — TELEPHONE ENCOUNTER
----- Message from Félix sent at 2024  1:52 PM CST -----  Contact: self  Katarina Rivera  MRN: [id number]  : 1934  PCP: Ethan Denton  Home Phone      [phone]  Work Phone      Not on file.  Mobile          [phone]      MESSAGE:   Pt states she was supposed to get a phone call about new appointment date and time per     Please advise    [phone]

## 2024-11-13 NOTE — PROGRESS NOTES
[facility]  Colon and Rectal Surgery    [address]  [place], [place]  Tel: [phone]  Fax: [phone]  [email or web address]   MD Estuardo Davis MD Brian Kann, MD W. Forrest Johnston, MD Matthew Giglia, MD Jennifer Paruch, MD William Kethman, MD Danielle Kay, MD     Patient name: Katarina Rivera   YOB: 1934   MRN: [id number]      It was a pleasure seeing Ms. Rivera in the Colon and Rectal Surgery clinic here at [facility].     As you know, Ms. Rivera is a 89 year old woman with a history of HTN, HLD, DVT/PE on Xarelto who presents for evaluation of UC . She was diagnosed in 1995. She was last seen by Dr. Kapadia in 12/2020 although he had seen her prior to this in 2016. She was on Lialda at that time and was rather asymptomatic but had a sigmoid stricture and sessile polyp at the hepatic flexure that was not removed. He recommended TAC+EI although she declined surgery at that time. In 2020, she was hospitalized for an NSTEMI and GI bleed thought related to her UC. She was reportedly being treated with Sulfasalazine. In review of this note, surgery was discussed but not ultimately performed as she declined.     11/14/2024  She is here today after being seen in the ED on 11/11/2024 for left lower quadrant abdominal pain, she was ultimately discharged with plan for outpatient management. She is not having any pain right now, she denies any nausea or vomiting. She does have a decreased appetite. She did fall and hit her head - see image below. She has about 2 bowel movements per day. She likes to go to the [employer] and play slots. She has lost weight recently. She lives with her daughter who is her caretaker.    Last echocardiogram in 2019    Last colonoscopy: 11/9/2020 (Complete)  The perianal and digital rectal examinations were normal.   Inflammation was found in a continuous and circumferential pattern from the rectum to the cecum. This was  graded as Daley Score 3 (severe, with spontaneous bleeding, ulcerations). A 30 mm polypoid lesion was found in the sigmoid colon. The lesion was fungating. Oozing was present. Biopsies were taken with a cold forceps for histology.   A localized area of moderately nodular mucosa was found in the transverse colon. Biopsies were taken with a cold forceps for histology. This is concerning for adenomatous flat mucosa without defined borders. This is located just distal to the tattoo site and just distal to the large semi-peduculated polyp Biopsies were taken with a cold forceps for histology.   A 20 mm polyp was found in the transverse colon. The polyp was semi-pedunculated. Polypectomy was not attempted due to the presence of severe colitis. A 15 mm polypoid lesion was found in the cecum. The lesion was sessile. No bleeding was present. Biopsies were taken with a cold forceps for histology.   A single (solitary) ten mm ulcer was found in the distal rectum. No bleeding was present. No stigmata of recent bleeding were seen. Biopsies were taken with a cold forceps for histology. This is fairly deep ulcerated lesion, unclear if this is fistulous or just an excavated / deep ulcer.     Pathology  1. Cecum, polypoid mucosa, biopsy:   Tubular adenoma.   See Note 1.   2. Transverse colon, irregular mucosa, biopsy:   Colonic mucosa with crypt architectural distortion, multifocal acute cryptitis with crypt abscesses and focal erosion, consistent with chronic moderately active colitis.   Focal nuclear atypia is seen, favor reactive atypia, see Note 2.   Negative for granulomas.   3. Polyp at 25 cm, biopsy:   Colonic mucosa with crypt architectural distortion and multifocal acute   cryptitis, consistent with chronic mildly active colitis.   Negative for granulomas and dysplasia.   4. Rectum, deep ulcer, biopsy:   Squamous mucosa, acutely inflamed.   Fragment of granulation tissue, consistent with ulcer bed.   CMV immunostain is  negative.   Note 1: The biopsy from the cecum shows colonic mucosa with low grade dysplasia. The endoscopic impression of polypoid cecal mucosa is noted. No significant active inflammation is seen in this biopsy. Clinical and endoscopic correlation is required to determine whether this lesion   represents a sporadic tubular adenoma vs. low grade nodular/flat epithelial dysplasia arising in the setting of the patient's Ulcerative Colitis.   Note 2: The clinical and endoscopic concern for flat epithelial dysplasia in the transverse colon is noted. The biopsy of the transverse colon shows a chronic moderately active colitis. Focally, nuclear hyperchromasia is seen, however this is in areas with acute inflammation. Nuclear   pseudostratification is also seen, but again predominately in areas which are acutely inflamed, and these changes do not reach the surface of the epithelium. Additional levels and p53 immunostain do not show definitive dysplastic changes. Given that most of the changes are seen in areas of acute inflammation, the changes are favored to represent reactive changes.   Re-biopsy may be warranted, if clinically indicated.     11/10/2015 - Colonoscopy  A benign-appearing, intrinsic severe stenosis measuring 2 cm (in length) x 9 mm (inner diameter) was found in the descending colon and was traversed with the GIF scope, it was too tight for the PCF. Biopsies were taken with a cold forceps for histology.   A 20 mm polyp was found at the hepatic flexure. The polyp was sessile. Biopsies were taken with a cold forceps for histology. Area was tattooed with an injection of 2 mL of Angle ink. this polyp seemed slightly firm in the center. there was some bleeding in the center with some umbilication, but no obvious ulceration. on NBI, it seems to spread out in a larger area.   Localized mild inflammation characterized by erosions and erythema was found in the transverse colon. Biopsies were taken with a cold  forceps for histology.   A 30 mm polyp was found in the sigmoid colon. The polyp was sessile, likely inflammatory. Biopsies were taken with a cold forceps for histology.   A few diverticula were found in the sigmoid colon.     CT AP - 11/11/2024  A suspected mass in the proximal descending colon. Colonic wall thickening involving the splenic flexure and descending colon with adjacent fat stranding, consistent with colitis. Small amount of pericolonic fluid. No free air or abscess. There is thickening of the splenic flexure of the colon extending into the proximal descending colon with a suspected intraluminal mass (images  of series 2 axial). There is pericolonic fat stranding with a small amount of pericolonic free fluid.     Lab Results   Component Value Date    ALBUMIN 2.8 (L) 11/11/2024    CREATININE 1.2 11/11/2024    HGB 11.5 (L) 11/11/2024    CALCIUM 8.6 (L) 11/11/2024     Wt Readings from Last 3 Encounters:   11/14/24 47.6 kg (105 lb)   11/11/24 47.6 kg (105 lb)   09/19/24 55.2 kg (121 lb 11.1 oz)     The patient was informed of the availability of a certified  without charge. A certified  was not necessary for this visit.    Review of Systems  See pertinent review of systems above    Past Medical History:   Diagnosis Date    Anemia     Glaucoma (increased eye pressure)     H/O blood clots 2019    HTN (hypertension)     Hyperchloremia     Tremor, essential     Ulcerative colitis confined to rectum      Past Surgical History:   Procedure Laterality Date    APPENDECTOMY      COLONOSCOPY      COLONOSCOPY N/A 11/10/2015    Procedure: COLONOSCOPY;  Surgeon: Michael San MD;  Location: [facility] ENDO [address]);  Service: Endoscopy;  Laterality: N/A;    COLONOSCOPY N/A 11/9/2020    Procedure: COLONOSCOPY;  Surgeon: Lyle Wilburn MD;  Location: [facility] ENDO;  Service: Endoscopy;  Laterality: N/A;    hemorrhoidectomy      HYSTERECTOMY      OOPHORECTOMY      TOTAL KNEE ARTHROPLASTY    "   left      Family History   Problem Relation Name Age of Onset    Stroke Mother      Blindness Father       Social History     Tobacco Use    Smoking status: Never     Passive exposure: Never    Smokeless tobacco: Never   Substance Use Topics    Alcohol use: No    Drug use: No     Review of patient's allergies indicates:  No Known Allergies    Current Outpatient Medications on File Prior to Visit   Medication Sig Dispense Refill    acetaminophen (TYLENOL) 650 MG TbSR Take 1 tablet (650 mg total) by mouth every 8 (eight) hours. 21 tablet 0    amLODIPine (NORVASC) 5 MG tablet Take 5 mg by mouth once daily.      atorvastatin (LIPITOR) 20 MG tablet Take 20 mg by mouth once daily.      cefdinir (OMNICEF) 300 MG capsule Take 1 capsule (300 mg total) by mouth once daily. for 10 days 10 capsule 0    EScitalopram oxalate (LEXAPRO) 10 MG tablet Take 10 mg by mouth once daily.      ferrous sulfate (FEOSOL) 325 mg (65 mg iron) Tab tablet Take 325 mg by mouth once daily.      metoprolol tartrate (LOPRESSOR) 50 MG tablet Take 50 mg by mouth once daily.      mupirocin (BACTROBAN) 2 % ointment Apply to cuts bid (Patient not taking: Reported on 5/4/2023) 22 g 5    omeprazole (PRILOSEC) 40 MG capsule Take 40 mg by mouth once daily.      sulfaSALAzine (AZULFIDINE) 500 MG EC tablet Take 2 tablets twice a day 180 tablet 3    sulfaSALAzine (AZULFIDINE) 500 MG EC tablet Take 1 tablet (500 mg total) by mouth 2 (two) times daily. 180 tablet 3    traMADoL (ULTRAM) 50 mg tablet Take 50 mg by mouth every 6 (six) hours as needed.      XARELTO 20 mg Tab TAKE 1 TABLET (20 MG TOTAL) BY MOUTH BEFORE DINNER. 90 tablet 3    [DISCONTINUED] furosemide (LASIX) 40 MG tablet TAKE ONE TABLET BY MOUTH EVERY DAY (Patient not taking: Reported on 2/13/2020) 30 tablet 11     No current facility-administered medications on file prior to visit.     Physical Examination  BP (!) 142/64   Pulse 104   Resp 16   Ht 5' 6" (1.676 m)   Wt 47.6 kg (105 lb)   SpO2 " 98%   BMI 16.95 kg/m²      A chaperone was present for the physical examination.    Constitutional: no cough, no dyspnea, alert, and no acute distress        Eye: Normal external eye, conjunctiva, and lids  Cardiovascular: regular rate and regular rhythm  Respiratory: normal air entry  Gastrointestinal: soft, non-tender  Musculoskeletal: full range of motion without pain  Neurologic: alert, oriented, normal speech, no focal findings or movement disorder noted  Psychiatric: appropriate, normal mood    Assessment and Plan of Care    Thank you again for referring Ms. Rivera to my care. In summary, Ms. Rivera is a 89 year old woman presenting with longstanding history of UC. We discussed treatment options and have provided the following recommendations:    Recommended that they speak and work with their primary care physician to better clarify and document goals of care and potentially arrange hospice services  The patient and I have discussed the indications for surgery - I discussed total abdominal colectomy and end ileostomy but I would not recommend this for her (although she would have said no). We discussed the role for segmental resection to remove the disease portion of their colon - the issues she is having now are likely sequela of the longstanding nature of her disease with findings on CT representing either development of a malignancy or partial obstruction related to the known stricture in this area. We discussed that like before in 2020 this will likely progress and result in potential for urgent/emergent intervention (although she is not interested in this), however, even elective intervention is life threatening. We did discuss the role of palliative care and continue medical optimization - the goal of this would be to provide pain relief if a complication of her disease would occur, timing of which is uncertain. See above.  They will follow-up with me as needed    Please do not hesitate to contact me if  you have any questions.      Star Hull MD, FACS, FASCRS  Department of Colon & Rectal Surgery  [facility]

## 2024-11-14 ENCOUNTER — OFFICE VISIT (OUTPATIENT)
Dept: SURGERY | Facility: CLINIC | Age: 89
End: 2024-11-14
Payer: MEDICARE

## 2024-11-14 VITALS
OXYGEN SATURATION: 98 % | SYSTOLIC BLOOD PRESSURE: 142 MMHG | HEIGHT: 66 IN | WEIGHT: 105 LBS | DIASTOLIC BLOOD PRESSURE: 64 MMHG | RESPIRATION RATE: 16 BRPM | HEART RATE: 104 BPM | BODY MASS INDEX: 16.88 KG/M2

## 2024-11-14 DIAGNOSIS — K51.011 ULCERATIVE PANCOLITIS WITH RECTAL BLEEDING: Primary | ICD-10-CM

## 2024-11-14 LAB — BACTERIA UR CULT: ABNORMAL

## 2024-11-30 ENCOUNTER — EXTERNAL CHRONIC CARE MANAGEMENT (OUTPATIENT)
Dept: PRIMARY CARE CLINIC | Facility: CLINIC | Age: 89
End: 2024-11-30
Payer: MEDICARE

## 2024-11-30 PROCEDURE — 99490 CHRNC CARE MGMT STAFF 1ST 20: CPT | Mod: S$PBB | Performed by: FAMILY MEDICINE

## 2024-11-30 PROCEDURE — 99999 PR STA SHADOW: CPT | Mod: PBBFAC,,, | Performed by: FAMILY MEDICINE

## 2024-12-17 ENCOUNTER — HOSPITAL ENCOUNTER (EMERGENCY)
Facility: HOSPITAL | Age: 89
Discharge: HOME | End: 2024-12-17
Attending: SURGERY
Payer: MEDICARE

## 2024-12-17 VITALS
TEMPERATURE: 98 F | HEART RATE: 100 BPM | OXYGEN SATURATION: 100 % | WEIGHT: 100 LBS | SYSTOLIC BLOOD PRESSURE: 135 MMHG | RESPIRATION RATE: 16 BRPM | HEIGHT: 66 IN | DIASTOLIC BLOOD PRESSURE: 68 MMHG | BODY MASS INDEX: 16.07 KG/M2

## 2024-12-17 DIAGNOSIS — R11.0 NAUSEA: ICD-10-CM

## 2024-12-17 DIAGNOSIS — Z51.5 HOSPICE CARE PATIENT: Primary | ICD-10-CM

## 2024-12-17 DIAGNOSIS — I21.4 NSTEMI (NON-ST ELEVATED MYOCARDIAL INFARCTION): ICD-10-CM

## 2024-12-17 LAB
ALBUMIN SERPL BCP-MCNC: 2.4 G/DL (ref 3.5–5.2)
ALP SERPL-CCNC: 100 U/L (ref 40–150)
ALT SERPL W/O P-5'-P-CCNC: 8 U/L (ref 10–44)
ANION GAP SERPL CALC-SCNC: 19 MMOL/L (ref 8–16)
AST SERPL-CCNC: 15 U/L (ref 10–40)
BACTERIA #/AREA URNS HPF: ABNORMAL /HPF
BASOPHILS # BLD AUTO: 0.08 K/UL (ref 0–0.2)
BASOPHILS NFR BLD: 0.4 % (ref 0–1.9)
BILIRUB SERPL-MCNC: 0.6 MG/DL (ref 0.1–1)
BILIRUB UR QL STRIP: ABNORMAL
BUN SERPL-MCNC: 30 MG/DL (ref 8–23)
CALCIUM SERPL-MCNC: 8.9 MG/DL (ref 8.7–10.5)
CHLORIDE SERPL-SCNC: 99 MMOL/L (ref 95–110)
CLARITY UR: ABNORMAL
CO2 SERPL-SCNC: 21 MMOL/L (ref 23–29)
COLOR UR: YELLOW
CREAT SERPL-MCNC: 1.1 MG/DL (ref 0.5–1.4)
DIFFERENTIAL METHOD BLD: ABNORMAL
EOSINOPHIL # BLD AUTO: 0 K/UL (ref 0–0.5)
EOSINOPHIL NFR BLD: 0.1 % (ref 0–8)
ERYTHROCYTE [DISTWIDTH] IN BLOOD BY AUTOMATED COUNT: 17.7 % (ref 11.5–14.5)
EST. GFR  (NO RACE VARIABLE): 48 ML/MIN/1.73 M^2
GLUCOSE SERPL-MCNC: 83 MG/DL (ref 70–110)
GLUCOSE UR QL STRIP: NEGATIVE
HCT VFR BLD AUTO: 38.4 % (ref 37–48.5)
HGB BLD-MCNC: 12.1 G/DL (ref 12–16)
HGB UR QL STRIP: NEGATIVE
HYALINE CASTS #/AREA URNS LPF: 10 /LPF
IMM GRANULOCYTES # BLD AUTO: 0.11 K/UL (ref 0–0.04)
IMM GRANULOCYTES NFR BLD AUTO: 0.6 % (ref 0–0.5)
KETONES UR QL STRIP: ABNORMAL
LEUKOCYTE ESTERASE UR QL STRIP: NEGATIVE
LIPASE SERPL-CCNC: 9 U/L (ref 4–60)
LYMPHOCYTES # BLD AUTO: 1.8 K/UL (ref 1–4.8)
LYMPHOCYTES NFR BLD: 9.8 % (ref 18–48)
MCH RBC QN AUTO: 25.8 PG (ref 27–31)
MCHC RBC AUTO-ENTMCNC: 31.5 G/DL (ref 32–36)
MCV RBC AUTO: 82 FL (ref 82–98)
MICROSCOPIC COMMENT: ABNORMAL
MONOCYTES # BLD AUTO: 1.2 K/UL (ref 0.3–1)
MONOCYTES NFR BLD: 6.5 % (ref 4–15)
NEUTROPHILS # BLD AUTO: 15.5 K/UL (ref 1.8–7.7)
NEUTROPHILS NFR BLD: 82.6 % (ref 38–73)
NITRITE UR QL STRIP: NEGATIVE
NRBC BLD-RTO: 0 /100 WBC
PH UR STRIP: 6 [PH] (ref 5–8)
PLATELET # BLD AUTO: 362 K/UL (ref 150–450)
PMV BLD AUTO: 9 FL (ref 9.2–12.9)
POTASSIUM SERPL-SCNC: 3.5 MMOL/L (ref 3.5–5.1)
PROT SERPL-MCNC: 7 G/DL (ref 6–8.4)
PROT UR QL STRIP: ABNORMAL
RBC # BLD AUTO: 4.69 M/UL (ref 4–5.4)
RBC #/AREA URNS HPF: 2 /HPF (ref 0–4)
SODIUM SERPL-SCNC: 139 MMOL/L (ref 136–145)
SP GR UR STRIP: 1.02 (ref 1–1.03)
SQUAMOUS #/AREA URNS HPF: 8 /HPF
TROPONIN I SERPL DL<=0.01 NG/ML-MCNC: 0.41 NG/ML (ref 0–0.03)
URN SPEC COLLECT METH UR: ABNORMAL
UROBILINOGEN UR STRIP-ACNC: 1 EU/DL
WBC # BLD AUTO: 18.75 K/UL (ref 3.9–12.7)
WBC #/AREA URNS HPF: 4 /HPF (ref 0–5)

## 2024-12-17 PROCEDURE — 36415 COLL VENOUS BLD VENIPUNCTURE: CPT | Performed by: SURGERY

## 2024-12-17 PROCEDURE — 80053 COMPREHEN METABOLIC PANEL: CPT | Performed by: SURGERY

## 2024-12-17 PROCEDURE — 93005 ELECTROCARDIOGRAM TRACING: CPT

## 2024-12-17 PROCEDURE — 93010 ELECTROCARDIOGRAM REPORT: CPT | Mod: ,,, | Performed by: INTERNAL MEDICINE

## 2024-12-17 PROCEDURE — 25000003 PHARM REV CODE 250: Performed by: SURGERY

## 2024-12-17 PROCEDURE — 96360 HYDRATION IV INFUSION INIT: CPT

## 2024-12-17 PROCEDURE — 85025 COMPLETE CBC W/AUTO DIFF WBC: CPT | Performed by: SURGERY

## 2024-12-17 PROCEDURE — 83690 ASSAY OF LIPASE: CPT | Performed by: SURGERY

## 2024-12-17 PROCEDURE — 99284 EMERGENCY DEPT VISIT MOD MDM: CPT | Mod: 25

## 2024-12-17 PROCEDURE — 81000 URINALYSIS NONAUTO W/SCOPE: CPT | Performed by: SURGERY

## 2024-12-17 PROCEDURE — 84484 ASSAY OF TROPONIN QUANT: CPT | Performed by: SURGERY

## 2024-12-17 RX ADMIN — SODIUM CHLORIDE 1000 ML: 9 INJECTION, SOLUTION INTRAVENOUS at 11:12

## 2024-12-17 NOTE — ED PROVIDER NOTES
Encounter Date: 12/17/2024       History     Chief Complaint   Patient presents with    Back Pain     Patient report lower back pain and butt pain. Per EMS staff patient daughter called [phone] because the patient could not get comfortable. EMS staff reports patient is sleeping in a recliner chair.      History of Present Illness  Katarina Rivera is a 89 y.o. female that presents with significant pain  Patient unfortunately looks like she has metastatic colon cancer  Patient was diagnosed with a colon mass by Colorectal surgery  Patient was seen by the surgeon & told a very poor prognosis  Patient never followed up with hospice recommendation by MD  Patient presents with severe cachexia, dehydration, end of life    The history is provided by the patient and a caregiver.     Review of patient's allergies indicates:  No Known Allergies  Past Medical History:   Diagnosis Date    Anemia     Glaucoma (increased eye pressure)     H/O blood clots 2019    HTN (hypertension)     Hyperchloremia     Tremor, essential     Ulcerative colitis confined to rectum      Past Surgical History:   Procedure Laterality Date    APPENDECTOMY      COLONOSCOPY      COLONOSCOPY N/A 11/10/2015    Procedure: COLONOSCOPY;  Surgeon: Michael San MD;  Location: [address];  Service: Endoscopy;  Laterality: N/A;    COLONOSCOPY N/A 11/9/2020    Procedure: COLONOSCOPY;  Surgeon: Lyle Wilburn MD;  Location: [facility];  Service: Endoscopy;  Laterality: N/A;    hemorrhoidectomy      HYSTERECTOMY      OOPHORECTOMY      TOTAL KNEE ARTHROPLASTY      left      Family History   Problem Relation Name Age of Onset    Stroke Mother      Blindness Father       Social History     Tobacco Use    Smoking status: Never     Passive exposure: Never    Smokeless tobacco: Never   Substance Use Topics    Alcohol use: No    Drug use: No     Review of Systems   Constitutional:  Positive for fatigue. Negative for fever.   HENT:  Negative for sore throat.     Respiratory:  Negative for shortness of breath.    Cardiovascular:  Negative for chest pain.   Gastrointestinal:  Positive for nausea.   Genitourinary:  Negative for dysuria.   Musculoskeletal:  Negative for back pain.   Skin:  Negative for rash.   Neurological:  Negative for weakness.   Hematological:  Does not bruise/bleed easily.       Physical Exam     Initial Vitals [12/17/24 1041]   BP Pulse Resp Temp SpO2   (!) 127/59 (!) 116 16 97.9 °F (36.6 °C) 100 %      MAP       --         Physical Exam    Nursing note and vitals reviewed.  Constitutional: Vital signs are normal. She appears well-developed and well-nourished. She is cooperative.   HENT:   Head: Normocephalic and atraumatic.   Eyes: Conjunctivae, EOM and lids are normal. Pupils are equal, round, and reactive to light.   Neck: Trachea normal and phonation normal. Neck supple. No JVD present.   Normal range of motion.   Full passive range of motion without pain.     Cardiovascular:  Normal rate, regular rhythm, S1 normal, S2 normal, normal heart sounds, intact distal pulses and normal pulses.           Pulmonary/Chest: Effort normal and breath sounds normal.   Abdominal: Abdomen is soft and flat. Bowel sounds are normal.   Musculoskeletal:         General: Normal range of motion.      Cervical back: Full passive range of motion without pain, normal range of motion and neck supple.     Neurological: She is alert and oriented to person, place, and time. She has normal strength.   Skin: Skin is warm, dry and intact. Capillary refill takes less than 2 seconds.         ED Course   Critical Care    Date/Time: 12/17/2024 12:58 PM    Performed by: Filipe Heredia MD  Authorized by: Filipe Heredia MD  Direct patient critical care time: 25 minutes  Additional history critical care time: 5 minutes  Ordering / reviewing critical care time: 5 minutes  Documentation critical care time: 5 minutes  Consult with family critical care time: 5 minutes  Other critical  care time: 5 minutes  Total critical care time (exclusive of procedural time) : 50 minutes  Critical care was necessary to treat or prevent imminent or life-threatening deterioration of the following conditions: cardiac failure and dehydration.  Critical care was time spent personally by me on the following activities: blood draw for specimens, development of treatment plan with patient or surrogate, discussions with consultants, interpretation of cardiac output measurements, evaluation of patient's response to treatment, examination of patient, obtaining history from patient or surrogate, ordering and performing treatments and interventions, ordering and review of laboratory studies, ordering and review of radiographic studies, re-evaluation of patient's condition and review of old charts.        Labs Reviewed   COMPREHENSIVE METABOLIC PANEL - Abnormal       Result Value    Sodium 139      Potassium 3.5      Chloride 99      CO2 21 (*)     Glucose 83      BUN 30 (*)     Creatinine 1.1      Calcium 8.9      Total Protein 7.0      Albumin 2.4 (*)     Total Bilirubin 0.6      Alkaline Phosphatase 100      AST 15      ALT 8 (*)     eGFR 48 (*)     Anion Gap 19 (*)    CBC W/ AUTO DIFFERENTIAL - Abnormal    WBC 18.75 (*)     RBC 4.69      Hemoglobin 12.1      Hematocrit 38.4      MCV 82      MCH 25.8 (*)     MCHC 31.5 (*)     RDW 17.7 (*)     Platelets 362      MPV 9.0 (*)     Immature Granulocytes 0.6 (*)     Gran # (ANC) 15.5 (*)     Immature Grans (Abs) 0.11 (*)     Lymph # 1.8      Mono # 1.2 (*)     Eos # 0.0      Baso # 0.08      nRBC 0      Gran % 82.6 (*)     Lymph % 9.8 (*)     Mono % 6.5      Eosinophil % 0.1      Basophil % 0.4      Differential Method Automated     TROPONIN I - Abnormal    Troponin I 0.410 (*)    URINALYSIS, REFLEX TO URINE CULTURE - Abnormal    Specimen UA Urine, Catheterized      Color, UA Yellow      Appearance, UA Cloudy (*)     pH, UA 6.0      Specific Gravity, UA 1.025      Protein, UA  2+ (*)     Glucose, UA Negative      Ketones, UA 3+ (*)     Bilirubin (UA) 3+ (*)     Occult Blood UA Negative      Nitrite, UA Negative      Urobilinogen, UA 1.0      Leukocytes, UA Negative      Narrative:     Specimen Source->Urine   URINALYSIS MICROSCOPIC - Abnormal    RBC, UA 2      WBC, UA 4      Bacteria Occasional      Squam Epithel, UA 8      Hyaline Casts, UA 10 (*)     Microscopic Comment Mucus      Narrative:     Specimen Source->Urine   LIPASE    Lipase 9       EKG Readings: (Independently Interpreted)   No STEMI  Sinus tachycardia  No ectopy  Normal conduction  Normal ST segments  Normal T-wave  Normal axis  Heart rate in the 100s       Imaging Results    None          Medications   sodium chloride 0.9% bolus 1,000 mL 1,000 mL (0 mLs Intravenous Stopped 12/17/24 1218)     Medical Decision Making  Differential includes dehydration, renal failure, metastatic colon cancer, near death    Problems Addressed:  Hospice care patient: complicated acute illness or injury  Nausea: complicated acute illness or injury  NSTEMI (non-ST elevated myocardial infarction): complicated acute illness or injury    Amount and/or Complexity of Data Reviewed  Labs: ordered. Decision-making details documented in ED Course.    ED Management & Risks of Complication, Morbidity, & Mortality:  Elevated troponin, normal white count, no urinary tract infection  Normal electrolytes, patient appears to be end of life  Severe cachexia, severe emaciation on ER evaluation today  Long discussion with the patient & her family regarding prognosis  They would like the patient to go on hospice for comfort care  I have consulted hospice for immediate admission today    Clinical Impression:  Final diagnoses:  [R11.0] Nausea  [I21.4] NSTEMI (non-ST elevated myocardial infarction)  [Z51.5] Hospice care patient (Primary)          ED Disposition Condition    Discharge Stable          ED Prescriptions    None       Follow-up Information       Follow up  With Specialties Details Why Contact Info    Ethan Denton MD Family Medicine Schedule an appointment as soon as possible for a visit in 2 days  [address] [place] [address]  [phone]               Filipe Heredia MD  12/17/24 0940

## 2024-12-17 NOTE — ED NOTES
Spoke with [facility] rep Andreina at the nurse station and states she is in process of setting up patient a hospital bed and home before discharging. She states she will call when the set up is complete and have patient transported by EMS back to home.

## 2024-12-17 NOTE — ED NOTES
Patient arrived to ED via EMS with the c/o lower back pain and buttocks pain. Per EMS patient is living at home with her daughters and sleeps in a recliner. Per EMS staff patient living conditions are not that great. Patient was covered in feces and urine. Patient cleaned and placed in a gown by staff. Wound noted to forehead that is crusted over thick scab like substance. Per the patient she had a wound from a fall 3 weeks ago and it has healed that way. Patient states it hurts if you touch it. Wound not draining and is dry and intact. Daughters present at the bedside.

## 2024-12-17 NOTE — ED NOTES
Spoke with Hospice RN Mariel. Equipment is now set up at pt home. OK to set of transport. She wants to be called when Ambulance leaves. [phone]

## 2024-12-17 NOTE — ED NOTES
Patient and family updated on [facility]. Per daughter the bed is being set up at the home now for the patient arrival.

## 2024-12-18 LAB
OHS QRS DURATION: 74 MS
OHS QTC CALCULATION: 536 MS

## 2024-12-31 ENCOUNTER — EXTERNAL CHRONIC CARE MANAGEMENT (OUTPATIENT)
Dept: PRIMARY CARE CLINIC | Facility: CLINIC | Age: 89
End: 2024-12-31
Payer: MEDICARE

## 2024-12-31 PROCEDURE — 99490 CHRNC CARE MGMT STAFF 1ST 20: CPT | Mod: PBBFAC | Performed by: FAMILY MEDICINE

## 2024-12-31 PROCEDURE — 99439 CHRNC CARE MGMT STAF EA ADDL: CPT | Mod: S$PBB | Performed by: FAMILY MEDICINE

## 2024-12-31 PROCEDURE — 99999 PR STA SHADOW: CPT | Mod: PBBFAC,,, | Performed by: FAMILY MEDICINE
